# Patient Record
Sex: FEMALE | Race: WHITE | NOT HISPANIC OR LATINO | Employment: UNEMPLOYED | URBAN - METROPOLITAN AREA
[De-identification: names, ages, dates, MRNs, and addresses within clinical notes are randomized per-mention and may not be internally consistent; named-entity substitution may affect disease eponyms.]

---

## 2023-07-05 ENCOUNTER — APPOINTMENT (OUTPATIENT)
Dept: PRIMARY CARE | Facility: CLINIC | Age: 48
End: 2023-07-05

## 2023-08-16 ENCOUNTER — APPOINTMENT (OUTPATIENT)
Dept: PRIMARY CARE | Facility: CLINIC | Age: 48
End: 2023-08-16

## 2023-10-17 DIAGNOSIS — I47.20 V TACH (MULTI): ICD-10-CM

## 2023-10-18 DIAGNOSIS — R06.02 SHORTNESS OF BREATH: ICD-10-CM

## 2023-10-18 DIAGNOSIS — Z01.818 PREPROCEDURAL EXAMINATION: ICD-10-CM

## 2023-10-20 ENCOUNTER — HOSPITAL ENCOUNTER (OUTPATIENT)
Dept: CARDIOLOGY | Facility: HOSPITAL | Age: 48
Discharge: HOME | End: 2023-10-20
Payer: COMMERCIAL

## 2023-10-20 DIAGNOSIS — R00.0 TACHYCARDIA: ICD-10-CM

## 2023-10-27 PROBLEM — R06.02 SOB (SHORTNESS OF BREATH) ON EXERTION: Status: ACTIVE | Noted: 2023-10-27

## 2023-10-31 ENCOUNTER — HOSPITAL ENCOUNTER (OUTPATIENT)
Facility: HOSPITAL | Age: 48
Setting detail: OUTPATIENT SURGERY
Discharge: HOME | End: 2023-10-31
Attending: INTERNAL MEDICINE | Admitting: INTERNAL MEDICINE
Payer: COMMERCIAL

## 2023-10-31 ENCOUNTER — CLINICAL SUPPORT (OUTPATIENT)
Dept: CARDIAC REHAB | Facility: HOSPITAL | Age: 48
End: 2023-10-31
Payer: COMMERCIAL

## 2023-10-31 VITALS
OXYGEN SATURATION: 100 % | RESPIRATION RATE: 12 BRPM | HEART RATE: 73 BPM | SYSTOLIC BLOOD PRESSURE: 135 MMHG | DIASTOLIC BLOOD PRESSURE: 87 MMHG

## 2023-10-31 DIAGNOSIS — I47.20 V TACH (MULTI): ICD-10-CM

## 2023-10-31 DIAGNOSIS — I27.20 PULMONARY HYPERTENSION, UNSPECIFIED (MULTI): ICD-10-CM

## 2023-10-31 DIAGNOSIS — Z01.818 PREPROCEDURAL EXAMINATION: ICD-10-CM

## 2023-10-31 DIAGNOSIS — R06.02 SHORTNESS OF BREATH: ICD-10-CM

## 2023-10-31 DIAGNOSIS — R06.02 SOB (SHORTNESS OF BREATH) ON EXERTION: Primary | ICD-10-CM

## 2023-10-31 DIAGNOSIS — R06.02 SOB (SHORTNESS OF BREATH) ON EXERTION: ICD-10-CM

## 2023-10-31 LAB
ANION GAP SERPL CALC-SCNC: 15 MMOL/L (ref 10–20)
BUN SERPL-MCNC: 10 MG/DL (ref 6–23)
CALCIUM SERPL-MCNC: 9.2 MG/DL (ref 8.6–10.6)
CHLORIDE SERPL-SCNC: 106 MMOL/L (ref 98–107)
CO2 SERPL-SCNC: 24 MMOL/L (ref 21–32)
CREAT SERPL-MCNC: 0.5 MG/DL (ref 0.5–1.05)
ERYTHROCYTE [DISTWIDTH] IN BLOOD BY AUTOMATED COUNT: 12.7 % (ref 11.5–14.5)
GFR SERPL CREATININE-BSD FRML MDRD: >90 ML/MIN/1.73M*2
GLUCOSE SERPL-MCNC: 85 MG/DL (ref 74–99)
HCT VFR BLD AUTO: 35.1 % (ref 36–46)
HGB BLD-MCNC: 11.5 G/DL (ref 12–16)
MCH RBC QN AUTO: 28 PG (ref 26–34)
MCHC RBC AUTO-ENTMCNC: 32.8 G/DL (ref 32–36)
MCV RBC AUTO: 86 FL (ref 80–100)
NRBC BLD-RTO: 0 /100 WBCS (ref 0–0)
PLATELET # BLD AUTO: 225 X10*3/UL (ref 150–450)
PMV BLD AUTO: 10.5 FL (ref 7.5–11.5)
POTASSIUM SERPL-SCNC: 3.4 MMOL/L (ref 3.5–5.3)
RBC # BLD AUTO: 4.1 X10*6/UL (ref 4–5.2)
SODIUM SERPL-SCNC: 142 MMOL/L (ref 136–145)
WBC # BLD AUTO: 4.2 X10*3/UL (ref 4.4–11.3)

## 2023-10-31 PROCEDURE — 93451 RIGHT HEART CATH: CPT | Performed by: INTERNAL MEDICINE

## 2023-10-31 PROCEDURE — 99153 MOD SED SAME PHYS/QHP EA: CPT | Performed by: INTERNAL MEDICINE

## 2023-10-31 PROCEDURE — 85027 COMPLETE CBC AUTOMATED: CPT | Performed by: INTERNAL MEDICINE

## 2023-10-31 PROCEDURE — 99152 MOD SED SAME PHYS/QHP 5/>YRS: CPT | Performed by: INTERNAL MEDICINE

## 2023-10-31 PROCEDURE — C1727 CATH, BAL TIS DIS, NON-VAS: HCPCS | Performed by: INTERNAL MEDICINE

## 2023-10-31 PROCEDURE — C1894 INTRO/SHEATH, NON-LASER: HCPCS | Performed by: INTERNAL MEDICINE

## 2023-10-31 PROCEDURE — 2500000005 HC RX 250 GENERAL PHARMACY W/O HCPCS: Performed by: INTERNAL MEDICINE

## 2023-10-31 PROCEDURE — 2500000004 HC RX 250 GENERAL PHARMACY W/ HCPCS (ALT 636 FOR OP/ED): Performed by: INTERNAL MEDICINE

## 2023-10-31 PROCEDURE — 2720000007 HC OR 272 NO HCPCS: Performed by: INTERNAL MEDICINE

## 2023-10-31 PROCEDURE — 36415 COLL VENOUS BLD VENIPUNCTURE: CPT | Performed by: INTERNAL MEDICINE

## 2023-10-31 PROCEDURE — 80048 BASIC METABOLIC PNL TOTAL CA: CPT | Performed by: INTERNAL MEDICINE

## 2023-10-31 PROCEDURE — 93464 EXERCISE W/HEMODYNAMIC MEAS: CPT | Performed by: INTERNAL MEDICINE

## 2023-10-31 PROCEDURE — 94621 CARDIOPULM EXERCISE TESTING: CPT | Performed by: INTERNAL MEDICINE

## 2023-10-31 RX ORDER — SODIUM CHLORIDE 9 MG/ML
INJECTION, SOLUTION INTRAVENOUS CONTINUOUS PRN
Status: COMPLETED | OUTPATIENT
Start: 2023-10-31 | End: 2023-10-31

## 2023-10-31 RX ORDER — FENTANYL CITRATE 50 UG/ML
INJECTION, SOLUTION INTRAMUSCULAR; INTRAVENOUS AS NEEDED
Status: DISCONTINUED | OUTPATIENT
Start: 2023-10-31 | End: 2023-10-31 | Stop reason: HOSPADM

## 2023-10-31 RX ORDER — LIDOCAINE HYDROCHLORIDE 10 MG/ML
INJECTION, SOLUTION EPIDURAL; INFILTRATION; INTRACAUDAL; PERINEURAL AS NEEDED
Status: DISCONTINUED | OUTPATIENT
Start: 2023-10-31 | End: 2023-10-31 | Stop reason: HOSPADM

## 2023-10-31 RX ORDER — RIOCIGUAT 2.5 MG/1
2.5 TABLET, FILM COATED ORAL 3 TIMES DAILY
COMMUNITY
End: 2024-04-12 | Stop reason: SDUPTHER

## 2023-10-31 RX ORDER — ACETAMINOPHEN 325 MG/1
650 TABLET ORAL EVERY 6 HOURS PRN
Status: CANCELLED | OUTPATIENT
Start: 2023-10-31

## 2023-10-31 RX ORDER — METOPROLOL TARTRATE 25 MG/1
25 TABLET, FILM COATED ORAL 2 TIMES DAILY
COMMUNITY
Start: 2023-10-18 | End: 2024-02-02 | Stop reason: SDUPTHER

## 2023-10-31 RX ORDER — MIDAZOLAM HYDROCHLORIDE 1 MG/ML
INJECTION INTRAMUSCULAR; INTRAVENOUS AS NEEDED
Status: DISCONTINUED | OUTPATIENT
Start: 2023-10-31 | End: 2023-10-31 | Stop reason: HOSPADM

## 2023-10-31 ASSESSMENT — ENCOUNTER SYMPTOMS: PALPITATIONS: 0

## 2023-10-31 NOTE — PROGRESS NOTES
Pharmacy Medication History Review    Jazmin Hein is a 48 y.o. female admitted for Shortness of breath. Pharmacy reviewed the patient's [id number] medications and allergies for accuracy.    The list below reflectives the updated PTA list. Please review each medication in order reconciliation for additional clarification and justification.  Medications Prior to Admission   Medication Sig Dispense Refill Last Dose    metoprolol tartrate (Lopressor) 25 mg tablet Take 1 tablet (25 mg) by mouth 2 times a day.   10/30/2023    Adempas 2.5 mg tablet Take 1 tablet (2.5 mg) by mouth 3 times a day.   10/30/2023    albuterol 90 mcg/actuation inhaler INHALE 1 PUFF EVERY 6 HOURS AS NEEDED 25.5 g 3 10/30/2023    allopurinol (Zyloprim) 100 mg tablet TAKE 1 TABLET BY MOUTH DAILY 90 tablet 3 10/30/2023    amitriptyline (Elavil) 50 mg tablet TAKE 1 TABLET BY MOUTH AT BEDTIME 90 tablet 3 10/30/2023    ergocalciferol (Vitamin D-2) 1.25 MG (52940 UT) capsule TAKE 1 CAPSULE BY MOUTH ONCE WEEKLY 12 capsule 3 Past Week    FLUoxetine (PROzac) 20 mg tablet TAKE 1 TABLET BY MOUTH ONCE DAILY 90 tablet 3 10/30/2023    furosemide (Lasix) 40 mg tablet TAKE 1 TABLET BY MOUTH ONCE DAILY 90 tablet 3 10/30/2023    gabapentin (Neurontin) 300 mg capsule TAKE 1 CAPSULE BY MOUTH ONCE DAILY 90 capsule 3 10/30/2023    gabapentin (Neurontin) 600 mg tablet TAKE 1 TABLET BY MOUTH ONCE DAILY 90 tablet 3 10/30/2023    galcanezumab (Emgality) 120 mg/mL prefilled syringe INJECT 1 ML UNDER THE SKIN ONCE MONTHLY 3 mL 3 10/7/2023    lacosamide (Vimpat) 100 mg tablet TAKE 1 TABLET BY MOUTH TWO TIMES A DAY FOR 30 DAYS 60 each 3 10/30/2023    losartan (Cozaar) 25 mg tablet TAKE 1 TABLET BY MOUTH ONCE DAILY 90 tablet 3 10/30/2023    methocarbamol (Robaxin) 750 mg tablet TAKE 1 TABLET BY MOUTH EVERY 8 HOURS 270 tablet 3 10/30/2023    rimegepant (NURTEC) 75 mg tablet,disintegrating DISSOLVE 1 TABLET IN MOUTH ON THE TONGUE EVERY OTHER DAY 96 tablet 0 Past Week     rivaroxaban (Xarelto) 10 mg tablet TAKE 1 TABLET BY MOUTH ONCE DAILY WITH FOOD 90 tablet 3 10/27/2023    sucralfate (Carafate) 1 gram tablet TAKE 1 TABLET BY MOUTH EVERY 6 HOURS ON AN EMPTY STOMACH 360 tablet 3 10/30/2023    SUMAtriptan (Imitrex) 100 mg tablet TAKE 1 TABLET BY MOUTH TWO TIMES A DAY AS NEEDED 60 tablet 11 Past Month        The list below reflectives the updated allergy list. Please review each documented allergy for additional clarification and justification.  Allergies  Reviewed by Dc Conley RN on 10/31/2023        Severity Reactions Comments    Iodine Low Rash         The patient was a great historian who knew her medications and dosages very well. The legacy holter note 10/20/23 , camryn , care everywhere , sure scripts and oarrs also used as sources.    Below are additional concerns with the patient's PTA list.      Duncan Mackay, [facility]  Transitions of Care Clinical Pharmacist  Please reach out via Epic Chat for questions, if no response call  [phone] or [facility]

## 2023-10-31 NOTE — Clinical Note
Sheath was inserted in the right internal jugular vein. Ultrasound guidance was used. 4 fr micropuncture

## 2023-10-31 NOTE — POST-PROCEDURE NOTE
Physician Transition of Care Summary  Invasive Cardiovascular Lab    Procedure Date: 10/31/2023  Attending:    Milton Holt - Primary  Resident/Fellow/Other Assistant: Surgeon(s) and Role:     * Fadumo Sena MD - Fellow     * Morro Dias MD - Fellow    Indications:   Pre-op Diagnosis     * Shortness of breath [R06.02]    Post-procedure diagnosis:   Post-op Diagnosis     * Shortness of breath [R06.02]    Procedure(s):   Right Heart Cath With Exercise  54837 - [place] RIGHT HEART CATH O2 SATURATION & CARDIAC OUTPUT    Right Heart Cath With Exercise  13804 - [place] PHYSIOLOGIC EXERCISE STUDY & HEMODYNAMIC MEASU    Procedure Findings:   Pulmonary hypertension.     RA: 6  RV: 69/4 (EDP 9)  PA: 62/20 (mean 38)  PCWP: 38  CO/CI: 5.8/3.1    PA sat: 70%    Description of the Procedure:   RHC: 7Fr RIJ access with leave-in swan-petr catheter to 50 cm    Arterial line: right radial artery access    Taken for exercise testing on treadmill after swan-petr catheter and arterial line placed.     Complications:   None    Stents/Implants:   None.     Anticoagulation/Antiplatelet Plan:   Resume home xarelto post procedure, held since 10/27.     Estimated Blood Loss:   5 mL    Anesthesia: Moderate Sedation Anesthesia Staff: No anesthesia staff entered.    Any Specimen(s) Removed:   No specimens collected during this procedure.    Disposition:   Discharge home post procedure.     Electronically signed by: Morro Dias MD, 10/31/2023 10:07 AM

## 2023-10-31 NOTE — Clinical Note
Sheath was left in place in the right internal jugular vein. Site secured by suture and transparent dressing. A pressure bag was used. Hemostasis obtained.

## 2023-10-31 NOTE — Clinical Note
Sheath was left in place in the right radial artery. Site secured by suture. A pressure bag was used. Hemostasis obtained.

## 2023-10-31 NOTE — H&P
"History Of Present Illness  Jazmin Hein is a 48 y.o. female with PMH of blood clots (, 2015), HTN, migraines, TBI (2019), ovarian cancer, and pulmonary hypertension (suspected CTEPH) who is presenting today for exercise RHC referred by Dr. Johnson.    She reports she was initially diagnosed with pulmonary hypertension in 2023 in [place] by Dr. Brar with [facility] who believed the mechanism to be CTEPH and thought to be inoperable. She was started on Adempas in July. She moved back to the area from [place] recently and says since then has uptitrated Adempas much faster (now at 2.5 TID since 23) and is seeing some improvement. On consultation with Dr. Johnson she reports that he does not believe she has CTEPH and thinks that her symptoms are exercise-induced and would like further evaluation.     Her primary symptoms are fatigue and shortness of breath that has been worsening over the last year. She has LE edema for which she takes daily lasix and intermittent chest pains as well.     RHC (23): PAp 60/ (33), PW 5, C.O. 4.52  V/Q (3/27/23): no discrete ventilation abnormalities, no bronchopulmonary segment conforming perfusion defects; Dr. Brar notes \"segmental perfusion defect in right mid lung zones that appear mismatched\"  PFTs (3/20/23): available for review  Echo (3/7/23): moderately enlarged RV with mildly reduced function, RA is upper limits of normal in size, moderate MR, no AR, moderate TR, RVSP 71 mmHg -> TODAY APPEARS BETTER, SHUNT (-)     Past Medical History  Past Medical History:   Diagnosis Date    Personal history of other diseases of the female genital tract 2016    History of endometriosis    Personal history of pulmonary embolism 2016    History of pulmonary embolism       Surgical History  Past Surgical History:   Procedure Laterality Date     SECTION, CLASSIC  2016     Section    GASTRIC BYPASS  2017    Gastric Surgery For Morbid Obesity " Bypass With [place]    HYSTERECTOMY  07/05/2016    Hysterectomy    OTHER SURGICAL HISTORY  07/05/2016    Gynecologic Laparoscopy With Adhesiolysis Fallopian Tubes        Social History  She has no history on file for tobacco use, alcohol use, and drug use.    Family History  No family history on file.     Allergies  Iodine    Review of Systems   Cardiovascular:  Negative for chest pain, palpitations and leg swelling.        Physical Exam     Last Recorded Vitals  There were no vitals taken for this visit.    Relevant Results      Constitutional: NAD, pleasant  HEENT: PERRLA, EOMI, no scleral icterus, MMM  CV: RRR, no m/r/g, no JVD  Pulm: CTAB, no increased WOB  GI: Soft, NT, ND, normoactive BS  Extremities: mild LE edema  Skin: WWP  Neuro: A&Ox4, no FND  Psych: Mood and affect appropriate to situation     Assessment/Plan   Principal Problem:    Shortness of breath  Active Problems:    SOB (shortness of breath) on exertion      -Plan for RHC with exercise  -Follow-up with Dr. Johnson for further management recommendations     I spent 30 minutes in the professional and overall care of this patient.      Morro Dias MD

## 2023-10-31 NOTE — Clinical Note
Gallion left in right jugular and art line placed in right radial.  Pt transported to new holding room for exercise challenge with Dr. Miguel

## 2023-11-01 ENCOUNTER — TELEPHONE (OUTPATIENT)
Dept: PULMONOLOGY | Facility: HOSPITAL | Age: 48
End: 2023-11-01
Payer: COMMERCIAL

## 2023-11-01 ENCOUNTER — APPOINTMENT (OUTPATIENT)
Dept: PULMONOLOGY | Facility: HOSPITAL | Age: 48
End: 2023-11-01
Payer: COMMERCIAL

## 2023-11-01 DIAGNOSIS — E87.8 ELECTROLYTE AND FLUID DISORDER: ICD-10-CM

## 2023-11-01 RX ORDER — POTASSIUM CHLORIDE 750 MG/1
10 TABLET, FILM COATED, EXTENDED RELEASE ORAL DAILY
Qty: 30 TABLET | Refills: 11 | Status: SHIPPED | OUTPATIENT
Start: 2023-11-01 | End: 2024-03-22 | Stop reason: HOSPADM

## 2023-11-01 NOTE — TELEPHONE ENCOUNTER
----- Message from Silvio Johnson DO sent at 10/31/2023 10:01 AM EDT -----  Needs additional potassium supplementation for K = 3.4  ----- Message -----  From: Lab, Background User  Sent: 10/31/2023   8:50 AM EDT  To: Silvio Johnson DO

## 2023-11-01 NOTE — TELEPHONE ENCOUNTER
Result Communication    Resulted Orders   CBC   Result Value Ref Range    WBC 4.2 (L) 4.4 - 11.3 x10*3/uL    nRBC 0.0 0.0 - 0.0 /100 WBCs    RBC 4.10 4.00 - 5.20 x10*6/uL    Hemoglobin 11.5 (L) 12.0 - 16.0 g/dL    Hematocrit 35.1 (L) 36.0 - 46.0 %    MCV 86 80 - 100 fL    MCH 28.0 26.0 - 34.0 pg    MCHC 32.8 32.0 - 36.0 g/dL    RDW 12.7 11.5 - 14.5 %    Platelets 225 150 - 450 x10*3/uL    MPV 10.5 7.5 - 11.5 fL   Basic Metabolic Panel   Result Value Ref Range    Glucose 85 74 - 99 mg/dL    Sodium 142 136 - 145 mmol/L    Potassium 3.4 (L) 3.5 - 5.3 mmol/L    Chloride 106 98 - 107 mmol/L    Bicarbonate 24 21 - 32 mmol/L    Anion Gap 15 10 - 20 mmol/L    Urea Nitrogen 10 6 - 23 mg/dL    Creatinine 0.50 0.50 - 1.05 mg/dL    eGFR >90 >60 mL/min/1.73m*2      Comment:      Calculations of estimated GFR are performed using the 2021 CKD-EPI Study Refit equation without the race variable for the IDMS-Traceable creatinine methods.  https://jasn.asnjournals.org/content/early/2021/09/22/ASN.4268350919    Calcium 9.2 8.6 - 10.6 mg/dL       10:49 AM      Results {WERE / WERE NOT:88720} successfully communicated with the {RHEUM PARENT/PATIENT:99235} and they {AMB Acknowledged/Did Not Acknowledge:89908} their understanding.

## 2023-11-01 NOTE — TELEPHONE ENCOUNTER
Patient K 3.4 on preprocedure labs, not currently taking potassium supplement. On lasix 40mg daily - started few months ago per patient. Dr. Johnson gave order to start potassium 10 meq daily. Patient verbalized understanding and is in agreement.

## 2023-11-13 RX ORDER — MELOXICAM 15 MG/1
1 TABLET ORAL DAILY
COMMUNITY
End: 2024-02-02 | Stop reason: WASHOUT

## 2023-11-13 RX ORDER — TIZANIDINE 4 MG/1
TABLET ORAL
COMMUNITY
End: 2024-03-08 | Stop reason: WASHOUT

## 2023-11-13 RX ORDER — CYCLOBENZAPRINE HCL 10 MG
TABLET ORAL
COMMUNITY
End: 2024-03-08 | Stop reason: WASHOUT

## 2023-11-13 RX ORDER — OMEPRAZOLE 40 MG/1
CAPSULE, DELAYED RELEASE ORAL
COMMUNITY

## 2023-11-13 NOTE — PROGRESS NOTES
History Of Present Illness  Jazmin Hein is a 48 y.o. female being seen in clinic iCEPT results. She was last seen in clinic on 2023. Original consult was on 2023.      RHC/ iCEPT (10/31/2023) At rest PAp: 70/35 (47); PWP: 18; CO/CI 8.1/4.3. with exercise PAp: 90/40 (57); PWP: 20; CO/CI: 11.2/6.1      PAH Treatment: Adempas 2.5mg TID.  Infusion site: N/A  Treatment history: N/A    23 No testing for today's visit.      Past Medical History  Patient Active Problem List   Diagnosis    Shortness of breath    SOB (shortness of breath) on exertion        Surgical History  She has a past surgical history that includes Hysterectomy (2016); Other surgical history (2016);  section, classic (2016); Gastric bypass (2017); and Cardiac catheterization (N/A, 10/31/2023).     Social History  She has no history on file for tobacco use, alcohol use, and drug use.    Family History  No family history on file.     Medications      Current Outpatient Medications:     albuterol (Ventolin HFA) 90 mcg/actuation inhaler, Inhale., Disp: , Rfl:     allopurinol (Zyloprim) 100 mg tablet, Take 1 tablet (100 mg) by mouth 2 times a day., Disp: , Rfl:     amitriptyline (Elavil) 50 mg tablet, Take 1 tablet (50 mg) by mouth once daily at bedtime., Disp: , Rfl:     blood sugar diagnostic strip, as directed, Disp: , Rfl:     ergocalciferol (Vitamin D-2) 1.25 MG (24859 UT) capsule, Take 1 capsule (1,250 mcg) by mouth once a week., Disp: , Rfl:     lamoTRIgine (LaMICtal) 100 mg tablet, , Disp: , Rfl:     Adempas 2.5 mg tablet, Take 1 tablet (2.5 mg) by mouth 3 times a day., Disp: , Rfl:     albuterol 90 mcg/actuation inhaler, INHALE 1 PUFF EVERY 6 HOURS AS NEEDED, Disp: 25.5 g, Rfl: 3    allopurinol (Zyloprim) 100 mg tablet, TAKE 1 TABLET BY MOUTH DAILY, Disp: 90 tablet, Rfl: 3    ALLOPURINOL ORAL, Take by mouth once daily., Disp: , Rfl:     amitriptyline (Elavil) 50 mg tablet, TAKE 1 TABLET BY MOUTH AT  BEDTIME, Disp: 90 tablet, Rfl: 3    ascorbic acid (Vitamin C) 500 mg tablet, Take 1 tablet (500 mg) by mouth once daily., Disp: , Rfl:     butalbitaL-acetaminophen  mg capsule, Take 1 capsule every 4 hours by oral route as needed., Disp: , Rfl:     CALCIUM CARBONATE-VITAMIN D3 ORAL, Take by mouth., Disp: , Rfl:     cariprazine (Vraylar) 1.5 mg capsule, , Disp: , Rfl:     cyclobenzaprine (Flexeril) 10 mg tablet, Take 1 tablet 3 times a day by oral route., Disp: , Rfl:     ergocalciferol (Vitamin D-2) 1.25 MG (17362 UT) capsule, TAKE 1 CAPSULE BY MOUTH ONCE WEEKLY, Disp: 12 capsule, Rfl: 3    FLUoxetine (PROzac) 20 mg tablet, TAKE 1 TABLET BY MOUTH ONCE DAILY, Disp: 90 tablet, Rfl: 3    furosemide (Lasix) 40 mg tablet, TAKE 1 TABLET BY MOUTH ONCE DAILY, Disp: 90 tablet, Rfl: 3    furosemide (Lasix) 40 mg tablet, Take by mouth once daily., Disp: , Rfl:     gabapentin (Neurontin) 300 mg capsule, TAKE 1 CAPSULE BY MOUTH ONCE DAILY, Disp: 90 capsule, Rfl: 3    gabapentin (Neurontin) 600 mg tablet, TAKE 1 TABLET BY MOUTH ONCE DAILY, Disp: 90 tablet, Rfl: 3    galcanezumab (Emgality) 120 mg/mL prefilled syringe, INJECT 1 ML UNDER THE SKIN ONCE MONTHLY, Disp: 3 mL, Rfl: 3    lacosamide (Vimpat) 100 mg tablet, TAKE 1 TABLET BY MOUTH TWO TIMES A DAY FOR 30 DAYS, Disp: 60 each, Rfl: 3    loratadine (Claritin) 10 mg tablet, Take 1 tablet (10 mg) by mouth once daily as needed., Disp: , Rfl:     losartan (Cozaar) 25 mg tablet, TAKE 1 TABLET BY MOUTH ONCE DAILY, Disp: 90 tablet, Rfl: 3    meloxicam (Mobic) 15 mg tablet, Take 1 tablet (15 mg) by mouth once daily., Disp: , Rfl:     methocarbamol (Robaxin) 750 mg tablet, TAKE 1 TABLET BY MOUTH EVERY 8 HOURS, Disp: 270 tablet, Rfl: 3    metoprolol tartrate (Lopressor) 25 mg tablet, Take 1 tablet (25 mg) by mouth 2 times a day., Disp: , Rfl:     multivitamin capsule, Take 1 capsule by mouth once daily., Disp: , Rfl:     omeprazole (PriLOSEC) 40 mg DR capsule, Take 1 capsule every  "day by oral route., Disp: , Rfl:     potassium chloride CR (Klor-Con) 10 mEq ER tablet, Take 1 tablet (10 mEq) by mouth once daily. Do not crush, chew, or split., Disp: 30 tablet, Rfl: 11    rimegepant (NURTEC) 75 mg tablet,disintegrating, DISSOLVE 1 TABLET IN MOUTH ON THE TONGUE EVERY OTHER DAY, Disp: 96 tablet, Rfl: 0    rivaroxaban (Xarelto) 10 mg tablet, TAKE 1 TABLET BY MOUTH ONCE DAILY WITH FOOD, Disp: 90 tablet, Rfl: 3    sucralfate (Carafate) 1 gram tablet, TAKE 1 TABLET BY MOUTH EVERY 6 HOURS ON AN EMPTY STOMACH, Disp: 360 tablet, Rfl: 3    SUMAtriptan (Imitrex) 100 mg tablet, TAKE 1 TABLET BY MOUTH TWO TIMES A DAY AS NEEDED, Disp: 60 tablet, Rfl: 11    tiZANidine (Zanaflex) 4 mg tablet, , Disp: , Rfl:      Allergies  Iodine (bulk) and Iodine    Review of Systems   Constitutional:  Positive for fatigue.   HENT: Negative.     Respiratory:  Positive for shortness of breath.    Cardiovascular:  Positive for palpitations.   Gastrointestinal: Negative.    Endocrine: Negative.    Genitourinary: Negative.    Skin: Negative.    Neurological: Negative.    Hematological: Negative.        Last Recorded Vitals  Blood pressure 97/63, pulse 98, height 1.626 m (5' 4\"), weight 108 kg (239 lb), SpO2 97 %.     Physical Exam  Constitutional:       Appearance: She is obese.   Cardiovascular:      Rate and Rhythm: Normal rate and regular rhythm.      Heart sounds: Murmur (2/6 ANA LSB) heard.   Pulmonary:      Breath sounds: Normal breath sounds.   Abdominal:      General: Bowel sounds are normal.      Palpations: Abdomen is soft.   Musculoskeletal:      Right lower leg: No edema.      Left lower leg: No edema.   Skin:     Findings: No rash.   Neurological:      General: No focal deficit present.   Psychiatric:         Mood and Affect: Mood normal.         Judgment: Judgment normal.       Vitals:    11/16/23 1438   BP: 97/63   Pulse: 98   SpO2: 97%     Vp=815       Relevant Results    6MWT (9/14/23)  SpO2: 97% - 95% on RA  HR: " "100 - 126  Wilfredo: 1 - 5  Actual 189m       V/Q scan (2023) The perfusion of both lungs is within normal limits with no evidence  for acute pulmonary embolism. Very low probability.           6MWT (2023)  SpO2: 100-97% 4LPM  HR:   Wilfredo-100  Actual meters: 146         RHC (23): PAp 60/23 (33), PW 5, C.O. 4.52    V/Q (3/27/23): no discrete ventilation abnormalities, no bronchopulmonary segment conforming perfusion defects; Dr. Brar notes \"segmental perfusion defect in right mid lung zones that appear mismatched\"    PFTs (3/20/23): available for review    Echo (3/7/23): moderately enlarged RV with mildly reduced function, RA is upper limits of normal in size, moderate MR, no AR, moderate TR, RVSP 71 mmHg               Assessment/Plan     1) Pulmonary  a. Normal PFTs and DLCO  b. Essentially normal CT  c. Paradoxically reported low oxygen levels although SpO2 walking for me today = 97% on 4l/min, walked on room air 2023 with desat to 95% on RA.   d. Stated remote () unprovoked PE with recurrence immediately after ceasing anticoagulation. Interpretation of V/Q with ? RML defect (Peripherally) I do not see it. Original interpretation did not see it. It is not common for presentation this many years after PE. Similarly even if there was small peripheral R defect, it does not explain profound report desat and exercise intolerance displayed. SEE BELOW UNDER CARDIOVASCULAR, NEW CATH CONSISTENT WITH PAH.  e. Mild ELENA, AHI =6 from study, lowest SpO2= 87% relatively briefly.      2) Cardiovascular  a. Paroxysmal resting SVT. (+) ZIO patch referred to EP.   b. Original Resting PA mean of 32 reported which was confusing given,  progression to 47 mm HG consistent with IPAH, FC III. Given adempas, needs additional therapy. Exercise cath 47-> 57, no change in PCWP.  c. ? TIA remote.     3) Neuropsych  a. ON multiple med  b. migranes.     May have fatigue elements of long COVID     Plan  1) Event " monitor (+) for tachycardia, referred to EP, patient not called, EPIC instituted, re-referred.   2) Add macitentan and selexipag sequentially. Macitentan first, then selexipag in about 4 weeks, follow up 6-8 weeks with 6 MW and echo.

## 2023-11-16 ENCOUNTER — OFFICE VISIT (OUTPATIENT)
Dept: PULMONOLOGY | Facility: HOSPITAL | Age: 48
End: 2023-11-16
Payer: COMMERCIAL

## 2023-11-16 VITALS
HEIGHT: 64 IN | OXYGEN SATURATION: 97 % | HEART RATE: 98 BPM | DIASTOLIC BLOOD PRESSURE: 63 MMHG | SYSTOLIC BLOOD PRESSURE: 97 MMHG | WEIGHT: 239 LBS | BODY MASS INDEX: 40.8 KG/M2

## 2023-11-16 DIAGNOSIS — I27.21 PULMONARY ARTERIAL HYPERTENSION (MULTI): Primary | ICD-10-CM

## 2023-11-16 DIAGNOSIS — R06.02 SHORTNESS OF BREATH: ICD-10-CM

## 2023-11-16 DIAGNOSIS — I27.20 PULMONARY HYPERTENSION (MULTI): ICD-10-CM

## 2023-11-16 DIAGNOSIS — R06.02 SOB (SHORTNESS OF BREATH) ON EXERTION: ICD-10-CM

## 2023-11-16 PROCEDURE — 99215 OFFICE O/P EST HI 40 MIN: CPT | Performed by: INTERNAL MEDICINE

## 2023-11-16 RX ORDER — ERGOCALCIFEROL 1.25 MG/1
1 CAPSULE ORAL WEEKLY
COMMUNITY
Start: 2023-08-14 | End: 2024-02-02 | Stop reason: WASHOUT

## 2023-11-16 RX ORDER — ALLOPURINOL 100 MG/1
1 TABLET ORAL 2 TIMES DAILY
COMMUNITY
Start: 2023-08-14 | End: 2024-02-02 | Stop reason: WASHOUT

## 2023-11-16 RX ORDER — AMITRIPTYLINE HYDROCHLORIDE 50 MG/1
1 TABLET, FILM COATED ORAL NIGHTLY
COMMUNITY
Start: 2023-08-14 | End: 2024-02-02 | Stop reason: WASHOUT

## 2023-11-16 RX ORDER — LORATADINE 10 MG/1
1 TABLET ORAL DAILY
COMMUNITY

## 2023-11-16 RX ORDER — ALBUTEROL SULFATE 90 UG/1
AEROSOL, METERED RESPIRATORY (INHALATION)
COMMUNITY
Start: 2023-08-14 | End: 2024-03-08 | Stop reason: SDUPTHER

## 2023-11-16 RX ORDER — ASCORBIC ACID 500 MG
500 TABLET ORAL DAILY
COMMUNITY

## 2023-11-16 RX ORDER — LAMOTRIGINE 100 MG/1
1 TABLET ORAL NIGHTLY
COMMUNITY
Start: 2023-10-24

## 2023-11-16 RX ORDER — FUROSEMIDE 40 MG/1
TABLET ORAL
COMMUNITY
End: 2024-02-02 | Stop reason: SDUPTHER

## 2023-11-16 RX ORDER — BUTALBITAL AND ACETAMINOPHEN 50; 300 MG/1; MG/1
1 CAPSULE ORAL EVERY 4 HOURS PRN
COMMUNITY

## 2023-11-16 ASSESSMENT — ENCOUNTER SYMPTOMS
PALPITATIONS: 1
NEUROLOGICAL NEGATIVE: 1
HEMATOLOGIC/LYMPHATIC NEGATIVE: 1
GASTROINTESTINAL NEGATIVE: 1
FATIGUE: 1
ENDOCRINE NEGATIVE: 1
SHORTNESS OF BREATH: 1

## 2023-11-16 ASSESSMENT — PAIN SCALES - GENERAL: PAINLEVEL: 0-NO PAIN

## 2024-01-04 PROCEDURE — RXMED WILLOW AMBULATORY MEDICATION CHARGE

## 2024-01-08 ENCOUNTER — PHARMACY VISIT (OUTPATIENT)
Dept: PHARMACY | Facility: CLINIC | Age: 49
End: 2024-01-08
Payer: COMMERCIAL

## 2024-01-29 ENCOUNTER — PATIENT OUTREACH (OUTPATIENT)
Dept: CARE COORDINATION | Facility: CLINIC | Age: 49
End: 2024-01-29
Payer: COMMERCIAL

## 2024-01-29 NOTE — PROGRESS NOTES
Received call from spouse, Gerardo.  States they have recently relocated to the [facility] from [place] due to Jazmin's health concerns and difficulty utilizing [facility] medical resources while in [place].  He states she is active w PCP through [facility]  and he states Jazmin is already established w Dr. Johnson for pulmonary HTN and that she also has hx of PE, right sided HF, and now recently (since 1.17 or 1.18.24) her Apple watch is detecting intermittent afib.  Needs help w obtaining Cardiology appt.  Appt scheduled w Dr. Martin on 2.2 at 11:20 am.  Gerardo states that Jazmin is already on Xarelto for PE.  Advised follow up w UC or ED for confirmation of afib.  He is unsure if she will be agreeable to this but will communicate this information to her.  Encouraged to call for any other issues/problems/concerns.

## 2024-02-01 PROBLEM — R10.811 ABDOMINAL RIGHT UPPER QUADRANT TENDERNESS: Status: ACTIVE | Noted: 2024-02-01

## 2024-02-01 PROBLEM — Z99.81 DEPENDENCE ON SUPPLEMENTAL OXYGEN: Status: ACTIVE | Noted: 2023-08-14

## 2024-02-01 PROBLEM — G45.9 TIA (TRANSIENT ISCHEMIC ATTACK): Status: ACTIVE | Noted: 2023-05-11

## 2024-02-01 PROBLEM — E55.9 VITAMIN D DEFICIENCY: Status: ACTIVE | Noted: 2024-02-01

## 2024-02-01 PROBLEM — F31.9 BIPOLAR DISORDER (MULTI): Status: ACTIVE | Noted: 2018-12-21

## 2024-02-01 PROBLEM — R93.1 ABNORMAL FINDINGS ON CARDIAC CATHETERIZATION: Status: ACTIVE | Noted: 2024-02-01

## 2024-02-01 PROBLEM — T78.40XA ALLERGIES: Status: ACTIVE | Noted: 2024-02-01

## 2024-02-01 PROBLEM — K71.9 DRUG-INDUCED LIVER INJURY: Status: ACTIVE | Noted: 2021-12-12

## 2024-02-01 PROBLEM — K25.9 GASTRIC ULCER: Status: ACTIVE | Noted: 2021-12-09

## 2024-02-01 PROBLEM — I27.21 PULMONARY ARTERIAL HYPERTENSION (MULTI): Status: RESOLVED | Noted: 2023-11-16 | Resolved: 2024-02-01

## 2024-02-01 PROBLEM — U07.1 DISEASE DUE TO SEVERE ACUTE RESPIRATORY SYNDROME CORONAVIRUS 2 (SARS-COV-2): Status: ACTIVE | Noted: 2024-02-01

## 2024-02-01 PROBLEM — M1A.00X0 CHRONIC GOUTY ARTHRITIS: Status: ACTIVE | Noted: 2024-02-01

## 2024-02-01 PROBLEM — G47.33 OBSTRUCTIVE SLEEP APNEA OF ADULT: Status: ACTIVE | Noted: 2024-02-01

## 2024-02-01 PROBLEM — E16.2 HYPOGLYCEMIA: Status: ACTIVE | Noted: 2020-05-19

## 2024-02-01 PROBLEM — M1A.3790: Status: ACTIVE | Noted: 2022-09-28

## 2024-02-01 PROBLEM — R29.898 LEFT ARM WEAKNESS: Status: ACTIVE | Noted: 2024-02-01

## 2024-02-01 PROBLEM — R10.9 ABDOMINAL PAIN OF MULTIPLE SITES: Status: ACTIVE | Noted: 2024-02-01

## 2024-02-01 PROBLEM — Z98.84 BARIATRIC SURGERY STATUS: Status: ACTIVE | Noted: 2020-08-02

## 2024-02-01 PROBLEM — R20.2 ARM PARESTHESIA, LEFT: Status: ACTIVE | Noted: 2024-02-01

## 2024-02-01 PROBLEM — R93.1 ABNORMAL ECHOCARDIOGRAM: Status: ACTIVE | Noted: 2024-02-01

## 2024-02-01 PROBLEM — G43.909 MIGRAINE HEADACHE: Status: ACTIVE | Noted: 2024-02-01

## 2024-02-01 PROBLEM — R53.1 WEAKNESS: Status: ACTIVE | Noted: 2024-02-01

## 2024-02-01 PROBLEM — E78.89 LIPIDS ABNORMAL: Status: ACTIVE | Noted: 2024-02-01

## 2024-02-01 PROBLEM — R11.2 NAUSEA AND VOMITING: Status: ACTIVE | Noted: 2024-02-01

## 2024-02-01 PROBLEM — N20.1 URETEROLITHIASIS: Status: ACTIVE | Noted: 2022-02-02

## 2024-02-01 PROBLEM — E78.5 HLD (HYPERLIPIDEMIA): Status: ACTIVE | Noted: 2024-02-01

## 2024-02-01 PROBLEM — R42 DIZZINESS: Status: ACTIVE | Noted: 2020-08-03

## 2024-02-01 PROBLEM — G89.18 POST-OPERATIVE PAIN: Status: ACTIVE | Noted: 2024-02-01

## 2024-02-01 PROBLEM — R10.13 ABDOMINAL PAIN, EPIGASTRIC: Status: ACTIVE | Noted: 2024-02-01

## 2024-02-01 PROBLEM — E87.8 ELECTROLYTE AND FLUID DISORDER: Status: ACTIVE | Noted: 2024-02-01

## 2024-02-01 PROBLEM — K91.2 HYPOGLYCEMIA AFTER GI (GASTROINTESTINAL) SURGERY (HHS-HCC): Status: ACTIVE | Noted: 2020-08-02

## 2024-02-01 PROBLEM — M79.89 BILATERAL SWELLING OF FEET: Status: ACTIVE | Noted: 2022-09-28

## 2024-02-01 PROBLEM — M54.50 LOWER BACK PAIN: Status: ACTIVE | Noted: 2024-02-01

## 2024-02-01 PROBLEM — I27.20 PULMONARY HYPERTENSION (MULTI): Status: ACTIVE | Noted: 2023-10-31

## 2024-02-01 PROBLEM — R74.01 TRANSAMINITIS: Status: ACTIVE | Noted: 2021-12-09

## 2024-02-01 PROBLEM — R32 INTERMITTENT URINARY INCONTINENCE: Status: ACTIVE | Noted: 2024-02-01

## 2024-02-01 PROBLEM — D72.819 LEUKOPENIA: Status: ACTIVE | Noted: 2020-08-02

## 2024-02-01 PROBLEM — Z85.43 HISTORY OF OVARIAN CANCER: Status: ACTIVE | Noted: 2020-08-02

## 2024-02-01 PROBLEM — M79.7 FIBROMYALGIA: Status: ACTIVE | Noted: 2020-05-19

## 2024-02-01 PROBLEM — G47.00 INSOMNIA: Status: ACTIVE | Noted: 2024-02-01

## 2024-02-01 PROBLEM — K81.1 CHOLECYSTITIS, CHRONIC: Status: ACTIVE | Noted: 2024-02-01

## 2024-02-01 PROBLEM — R00.0 TACHYCARDIA: Status: ACTIVE | Noted: 2023-09-14

## 2024-02-01 PROBLEM — N13.9 ACUTE UNILATERAL OBSTRUCTIVE UROPATHY: Status: ACTIVE | Noted: 2022-02-04

## 2024-02-01 PROBLEM — R23.2 FLUSHING: Status: ACTIVE | Noted: 2024-02-01

## 2024-02-01 PROBLEM — R20.2 PARESTHESIA OF SKIN: Status: ACTIVE | Noted: 2023-05-11

## 2024-02-02 ENCOUNTER — OFFICE VISIT (OUTPATIENT)
Dept: CARDIOLOGY | Facility: CLINIC | Age: 49
End: 2024-02-02
Payer: COMMERCIAL

## 2024-02-02 VITALS
BODY MASS INDEX: 39.27 KG/M2 | SYSTOLIC BLOOD PRESSURE: 108 MMHG | WEIGHT: 230 LBS | HEART RATE: 107 BPM | HEIGHT: 64 IN | DIASTOLIC BLOOD PRESSURE: 68 MMHG

## 2024-02-02 DIAGNOSIS — I27.20 PULMONARY HYPERTENSION (MULTI): ICD-10-CM

## 2024-02-02 DIAGNOSIS — J96.11 CHRONIC RESPIRATORY FAILURE WITH HYPOXIA (MULTI): ICD-10-CM

## 2024-02-02 DIAGNOSIS — I49.3 PVC (PREMATURE VENTRICULAR CONTRACTION): ICD-10-CM

## 2024-02-02 DIAGNOSIS — I47.29 NSVT (NONSUSTAINED VENTRICULAR TACHYCARDIA) (MULTI): ICD-10-CM

## 2024-02-02 DIAGNOSIS — I27.82 CHRONIC PULMONARY EMBOLISM, UNSPECIFIED PULMONARY EMBOLISM TYPE, UNSPECIFIED WHETHER ACUTE COR PULMONALE PRESENT (MULTI): ICD-10-CM

## 2024-02-02 DIAGNOSIS — I47.10 PAROXYSMAL SUPRAVENTRICULAR TACHYCARDIA (CMS-HCC): ICD-10-CM

## 2024-02-02 DIAGNOSIS — R07.9 CHEST PAIN, UNSPECIFIED TYPE: ICD-10-CM

## 2024-02-02 DIAGNOSIS — I49.1 PAC (PREMATURE ATRIAL CONTRACTION): ICD-10-CM

## 2024-02-02 DIAGNOSIS — R06.02 SOB (SHORTNESS OF BREATH) ON EXERTION: Primary | ICD-10-CM

## 2024-02-02 DIAGNOSIS — I10 PRIMARY HYPERTENSION: ICD-10-CM

## 2024-02-02 DIAGNOSIS — R00.2 PALPITATIONS: ICD-10-CM

## 2024-02-02 DIAGNOSIS — Z95.0 PACEMAKER: ICD-10-CM

## 2024-02-02 PROCEDURE — 1036F TOBACCO NON-USER: CPT | Performed by: INTERNAL MEDICINE

## 2024-02-02 PROCEDURE — 3078F DIAST BP <80 MM HG: CPT | Performed by: INTERNAL MEDICINE

## 2024-02-02 PROCEDURE — 93000 ELECTROCARDIOGRAM COMPLETE: CPT | Performed by: INTERNAL MEDICINE

## 2024-02-02 PROCEDURE — 99205 OFFICE O/P NEW HI 60 MIN: CPT | Performed by: INTERNAL MEDICINE

## 2024-02-02 PROCEDURE — 3074F SYST BP LT 130 MM HG: CPT | Performed by: INTERNAL MEDICINE

## 2024-02-02 RX ORDER — MACITENTAN 10 MG/1
10 TABLET, FILM COATED ORAL DAILY
COMMUNITY

## 2024-02-02 RX ORDER — METOPROLOL TARTRATE 25 MG/1
50 TABLET, FILM COATED ORAL 2 TIMES DAILY
Qty: 360 TABLET | Refills: 3 | Status: SHIPPED | OUTPATIENT
Start: 2024-02-02 | End: 2025-02-01

## 2024-02-02 NOTE — PROGRESS NOTES
"Chief Complaint:   New Patient Visit     History Of Present Illness:    Jazmin Hein is a 48 y.o. female here for evaluation of palpitations.  She has history of primary pulmonary hypertension and is following up with Dr. Hagan in [facility].  She has some right heart failure or cor pulmonale from this and remains on diuretics.  She has history of recurrent PE and remains on a prophylactic dose of Xarelto she has moved from [place] recently.  She had a pacemaker implanted in 2019 while in [place] for what sounds like a malignant vasovagal syncope although the records are yet to be reviewed.  After pacemaker implantation she was told she has SVTs noted on pacemaker check and was started on a beta-blocker.  Also has a prior history of TBI, seizure disorder from that, gastric bypass surgery in 2017 some stomach ulcers, right knee replacement in 2018 followed by revision in 2019 and ovarian cancer at the age of 21.    No family history of coronary artery disease in immediate relatives.    She has history of hypertension and borderline increased lipids.    She recently started noticing a lot of palpitations and skipped beats.  This has been going on for months has worsened in the last few weeks.  As a result Dr Johnson ordered a event recorder that showed PVCs correlating with her symptoms.  Additionally there were nonsustained VT and PACs noted as well I personally reviewed the monitor.    She had an echo done at [facility] September 2023 that was normal other than moderate to severe pulmonary hypertension.  She had a right heart catheterization with exercise done at [facility] that showed normal cardiac output index and normal cardiac indicis with only pulmonary arterial hypertension.     Last Recorded Vitals:  Vitals:    02/02/24 1133   BP: 108/68   Pulse: 107   Weight: 104 kg (230 lb)   Height: 1.626 m (5' 4\")       Past Medical History:  She has a past medical history of Personal history of other diseases of " the female genital tract (2016) and Personal history of pulmonary embolism (2016).    Past Surgical History:  She has a past surgical history that includes Hysterectomy (2016); Other surgical history (2016);  section, classic (2016); Gastric bypass (2017); and Cardiac catheterization (N/A, 10/31/2023).      Social History:  She reports that she has never smoked. She has never used smokeless tobacco. She reports that she does not currently use alcohol. She reports that she does not use drugs.    Family History:  No family history on file.     Allergies:  Iodine (bulk) and Iodine    Outpatient Medications:  Current Outpatient Medications   Medication Instructions    Adempas 2.5 mg, oral, 3 times daily    albuterol (Ventolin HFA) 90 mcg/actuation inhaler inhalation    albuterol 90 mcg/actuation inhaler INHALE 1 PUFF EVERY 6 HOURS AS NEEDED    allopurinol (Zyloprim) 100 mg tablet TAKE 1 TABLET BY MOUTH DAILY    allopurinol (Zyloprim) 100 mg tablet 1 tablet, oral, 2 times daily    ALLOPURINOL ORAL oral, Daily RT    amitriptyline (Elavil) 50 mg tablet TAKE 1 TABLET BY MOUTH AT BEDTIME    amitriptyline (Elavil) 50 mg tablet 1 tablet, oral, Nightly    ascorbic acid (VITAMIN C) 500 mg, oral, Daily    blood sugar diagnostic strip as directed    butalbitaL-acetaminophen  mg capsule Take 1 capsule every 4 hours by oral route as needed.    CALCIUM CARBONATE-VITAMIN D3 ORAL oral    cariprazine (Vraylar) 1.5 mg capsule     cyclobenzaprine (Flexeril) 10 mg tablet Take 1 tablet 3 times a day by oral route.    ergocalciferol (Vitamin D-2) 1.25 MG (47029 UT) capsule TAKE 1 CAPSULE BY MOUTH ONCE WEEKLY    ergocalciferol (Vitamin D-2) 1.25 MG (30617 UT) capsule 1 capsule, oral, Weekly    FLUoxetine (PROzac) 20 mg tablet TAKE 1 TABLET BY MOUTH ONCE DAILY    furosemide (Lasix) 40 mg tablet TAKE 1 TABLET BY MOUTH ONCE DAILY    furosemide (Lasix) 40 mg tablet oral, Daily RT    gabapentin  (Neurontin) 300 mg capsule TAKE 1 CAPSULE BY MOUTH ONCE DAILY    gabapentin (Neurontin) 600 mg tablet TAKE 1 TABLET BY MOUTH ONCE DAILY    galcanezumab (Emgality) 120 mg/mL prefilled syringe INJECT 1 ML UNDER THE SKIN ONCE MONTHLY    lacosamide (Vimpat) 100 mg tablet TAKE 1 TABLET BY MOUTH TWO TIMES A DAY FOR 30 DAYS    lamoTRIgine (LaMICtal) 100 mg tablet     loratadine (Claritin) 10 mg tablet 1 tablet, oral, Daily PRN    losartan (Cozaar) 25 mg tablet TAKE 1 TABLET BY MOUTH ONCE DAILY    meloxicam (Mobic) 15 mg tablet 1 tablet, oral, Daily    methocarbamol (Robaxin) 750 mg tablet TAKE 1 TABLET BY MOUTH EVERY 8 HOURS    metoprolol tartrate (LOPRESSOR) 25 mg, oral, 2 times daily    multivitamin capsule 1 capsule, oral, Daily RT    omeprazole (PriLOSEC) 40 mg DR capsule Take 1 capsule every day by oral route.    Opsumit 10 mg, oral, Daily    potassium chloride CR (Klor-Con) 10 mEq ER tablet 10 mEq, oral, Daily, Do not crush, chew, or split.    rimegepant (NURTEC) 75 mg tablet,disintegrating DISSOLVE 1 TABLET IN MOUTH ON THE TONGUE EVERY OTHER DAY    rivaroxaban (Xarelto) 10 mg tablet TAKE 1 TABLET BY MOUTH ONCE DAILY WITH FOOD    selexipag (UPTRAVI) 1,000 mcg, oral, 2 times daily, Do not crush, chew, or split.    sucralfate (Carafate) 1 gram tablet TAKE 1 TABLET BY MOUTH EVERY 6 HOURS ON AN EMPTY STOMACH    SUMAtriptan (Imitrex) 100 mg tablet TAKE 1 TABLET BY MOUTH TWO TIMES A DAY AS NEEDED    tiZANidine (Zanaflex) 4 mg tablet        Physical Exam:  Physical Exam  Vitals reviewed.   Constitutional:       Appearance: Normal appearance.   Neck:      Vascular: No carotid bruit or JVD.   Cardiovascular:      Rate and Rhythm: Normal rate and regular rhythm.      Pulses: Normal pulses.      Heart sounds: Normal heart sounds, S1 normal and S2 normal.   Pulmonary:      Effort: Pulmonary effort is normal. No respiratory distress.      Breath sounds: No wheezing or rales.   Abdominal:      General: Abdomen is flat.       "Palpations: Abdomen is soft.   Musculoskeletal:      Right lower leg: No edema.      Left lower leg: No edema.   Skin:     General: Skin is warm.   Neurological:      Mental Status: She is alert and oriented to person, place, and time. Mental status is at baseline.   Psychiatric:         Mood and Affect: Mood normal.         Behavior: Behavior normal.           Last Labs:  CBC -  Lab Results   Component Value Date    WBC 4.2 (L) 10/31/2023    HGB 11.5 (L) 10/31/2023    HCT 35.1 (L) 10/31/2023    MCV 86 10/31/2023     10/31/2023       CMP -  Lab Results   Component Value Date    CALCIUM 9.2 10/31/2023    PROT 6.8 08/22/2018    ALBUMIN 4.3 08/22/2018    AST 36 08/22/2018    ALT 43 08/22/2018    ALKPHOS 91 08/22/2018    BILITOT 0.5 08/22/2018       LIPID PANEL -   Lab Results   Component Value Date    CHOL 226 (H) 04/16/2018    TRIG 86 04/16/2018    HDL 60.2 04/16/2018    CHHDL 3.8 04/16/2018    LDLF 149 (H) 04/16/2018    VLDL 17 04/16/2018       RENAL FUNCTION PANEL -   Lab Results   Component Value Date    GLUCOSE 85 10/31/2023     10/31/2023    K 3.4 (L) 10/31/2023     10/31/2023    CO2 24 10/31/2023    ANIONGAP 15 10/31/2023    BUN 10 10/31/2023    CREATININE 0.50 10/31/2023    CALCIUM 9.2 10/31/2023    ALBUMIN 4.3 08/22/2018        Lab Results   Component Value Date    HGBA1C 5.4 05/12/2023       Last Cardiology Tests:  ECG:  No results found for this or any previous visit from the past 1095 days.      Echo:  No results found for this or any previous visit from the past 1095 days.      Ejection Fractions:  No results found for: \"EF\"    Cath:  Cardiac catheterization - non-coronary 10/31/2023      Stress Test:  Cardiopulmonary (Metabolic) Stress Test       Cardiac Imaging:  No results found for this or any previous visit from the past 1095 days.          Assessment/Plan   1-palpitations-symptoms seem secondary to PVCs but she also has prior history of SVTs, nonsustained VT's seen in the current " monitor, as well as PACs.  At this time I opted for increasing the beta-blocker.  She has a structurally normal heart.  I will recheck her electrolytes magnesium and TSH.  I will arrange for a stress test to rule out ischemic heart disease.  If symptoms do not subside with the beta-blockers we will refer her to EP for further evaluation and treatment.    2-presence of permanent pacemaker-unclear why this was implanted seems malignant syncope with cardioinhibitory response need to get records from [place].  Established in device clinic.    3-shortness of breath and chest pain-possibly pulmonary in origin we are ruling out significant coronary artery disease with stress test see above.      Carolina Martin MD

## 2024-02-16 ENCOUNTER — APPOINTMENT (OUTPATIENT)
Dept: CARDIOLOGY | Facility: HOSPITAL | Age: 49
End: 2024-02-16
Payer: COMMERCIAL

## 2024-02-22 PROCEDURE — RXMED WILLOW AMBULATORY MEDICATION CHARGE

## 2024-03-04 ENCOUNTER — PHARMACY VISIT (OUTPATIENT)
Dept: PHARMACY | Facility: CLINIC | Age: 49
End: 2024-03-04
Payer: COMMERCIAL

## 2024-03-06 ASSESSMENT — ENCOUNTER SYMPTOMS
ENDOCRINE NEGATIVE: 1
SHORTNESS OF BREATH: 1
PALPITATIONS: 1
HEMATOLOGIC/LYMPHATIC NEGATIVE: 1
FATIGUE: 1
NEUROLOGICAL NEGATIVE: 1
GASTROINTESTINAL NEGATIVE: 1

## 2024-03-06 NOTE — PROGRESS NOTES
History Of Present Illness  Jazmin Hein is a 49 y.o. female being seen in clinic iCEPT results. She was last seen in clinic on 11/16/2023. Original consult was on 8/14/2023.      RHC/ iCEPT (10/31/2023) At rest PAp: 70/35 (47); PWP: 18; CO/CI 8.1/4.3. with exercise PAp: 90/40 (57); PWP: 20; CO/CI: 11.2/6.1      PAH Treatment: Adempas 2.5mg TID, Opsumit and Uptravi _____BID.  Infusion site: N/A  Treatment history: N/A    03/06/24 Testing today includes echo, 6MWT and labs    Past Medical History  Patient Active Problem List   Diagnosis    Dyspnea on exertion    SOB (shortness of breath) on exertion    Vitamin D deficiency    Ureterolithiasis    Transaminitis    TIA (transient ischemic attack)    Tachycardia    Weakness    Sleep apnea    Chronic pulmonary embolism (CMS/HCC)    Pulmonary hypertension (CMS/HCC)    Post-operative pain    Paresthesia of skin    Dependence on supplemental oxygen    Obstructive sleep apnea of adult    Obesity    Nausea and vomiting    Migraine headache    Hypoglycemia after GI (gastrointestinal) surgery    Lower back pain    Lipids abnormal    Leukopenia    Left arm weakness    Intermittent urinary incontinence    Insomnia    Hypoglycemia    Hypertension    Flushing    HLD (hyperlipidemia)    History of ovarian cancer    Heartburn    Arthritis    Gastric ulcer    Fibromyalgia    Electrolyte and fluid disorder    Drug-induced liver injury    Dizziness    Disease due to severe acute respiratory syndrome coronavirus 2 (SARS-CoV-2)    Daytime sleepiness    Chronic gouty arthritis    Chronic gout of foot due to renal impairment    Cholecystitis, chronic    Bipolar disorder (CMS/HCC)    Bilateral swelling of feet    Bariatric surgery status    Arm paresthesia, left    Allergies    Acute unilateral obstructive uropathy    Abnormal findings on cardiac catheterization    Abnormal echocardiogram    Abdominal right upper quadrant tenderness    Abdominal pain, epigastric    Abdominal pain of  multiple sites    Pacemaker    Chronic respiratory failure with hypoxia (CMS/HCC)    Paroxysmal supraventricular tachycardia    PAC (premature atrial contraction)    PVC (premature ventricular contraction)    NSVT (nonsustained ventricular tachycardia) (CMS/HCC)    Palpitations    Chest pain        Surgical History  She has a past surgical history that includes Hysterectomy (2016); Other surgical history (2016);  section, classic (2016); Gastric bypass (2017); and Cardiac catheterization (N/A, 10/31/2023).     Social History  She reports that she has never smoked. She has never used smokeless tobacco. She reports that she does not currently use alcohol. She reports that she does not use drugs.    Family History  No family history on file.     Medications      Current Outpatient Medications:     Adempas 2.5 mg tablet, Take 1 tablet (2.5 mg) by mouth 3 times a day., Disp: , Rfl:     albuterol (Ventolin HFA) 90 mcg/actuation inhaler, Inhale., Disp: , Rfl:     albuterol 90 mcg/actuation inhaler, INHALE 1 PUFF EVERY 6 HOURS AS NEEDED (Patient not taking: Reported on 2024), Disp: 25.5 g, Rfl: 3    allopurinol (Zyloprim) 100 mg tablet, TAKE 1 TABLET BY MOUTH DAILY, Disp: 90 tablet, Rfl: 3    amitriptyline (Elavil) 50 mg tablet, TAKE 1 TABLET BY MOUTH AT BEDTIME, Disp: 90 tablet, Rfl: 3    ascorbic acid (Vitamin C) 500 mg tablet, Take 1 tablet (500 mg) by mouth once daily., Disp: , Rfl:     blood sugar diagnostic strip, as directed, Disp: , Rfl:     butalbitaL-acetaminophen  mg capsule, Take 1 capsule every 4 hours by oral route as needed., Disp: , Rfl:     CALCIUM CARBONATE-VITAMIN D3 ORAL, Take by mouth., Disp: , Rfl:     cariprazine (Vraylar) 1.5 mg capsule, , Disp: , Rfl:     cyclobenzaprine (Flexeril) 10 mg tablet, Take 1 tablet 3 times a day by oral route., Disp: , Rfl:     ergocalciferol (Vitamin D-2) 1.25 MG (15104 UT) capsule, TAKE 1 CAPSULE BY MOUTH ONCE WEEKLY, Disp: 12  capsule, Rfl: 3    FLUoxetine (PROzac) 20 mg tablet, TAKE 1 TABLET BY MOUTH ONCE DAILY, Disp: 90 tablet, Rfl: 3    furosemide (Lasix) 40 mg tablet, TAKE 1 TABLET BY MOUTH ONCE DAILY, Disp: 90 tablet, Rfl: 3    gabapentin (Neurontin) 300 mg capsule, TAKE 1 CAPSULE BY MOUTH ONCE DAILY, Disp: 90 capsule, Rfl: 3    gabapentin (Neurontin) 600 mg tablet, TAKE 1 TABLET BY MOUTH ONCE DAILY, Disp: 90 tablet, Rfl: 3    galcanezumab (Emgality) 120 mg/mL prefilled syringe, INJECT 1 ML UNDER THE SKIN ONCE MONTHLY, Disp: 3 mL, Rfl: 3    lacosamide (Vimpat) 100 mg tablet, TAKE 1 TABLET BY MOUTH TWO TIMES A DAY FOR 30 DAYS, Disp: 60 each, Rfl: 3    lamoTRIgine (LaMICtal) 100 mg tablet, , Disp: , Rfl:     loratadine (Claritin) 10 mg tablet, Take 1 tablet (10 mg) by mouth once daily as needed., Disp: , Rfl:     losartan (Cozaar) 25 mg tablet, TAKE 1 TABLET BY MOUTH ONCE DAILY, Disp: 90 tablet, Rfl: 3    macitentan (Opsumit) 10 mg tablet tablet, Take 1 tablet (10 mg) by mouth once daily., Disp: , Rfl:     methocarbamol (Robaxin) 750 mg tablet, TAKE 1 TABLET BY MOUTH EVERY 8 HOURS, Disp: 270 tablet, Rfl: 3    metoprolol tartrate (Lopressor) 25 mg tablet, Take 2 tablets (50 mg) by mouth 2 times a day., Disp: 360 tablet, Rfl: 3    multivitamin capsule, Take 1 capsule by mouth once daily., Disp: , Rfl:     omeprazole (PriLOSEC) 40 mg DR capsule, Take 1 capsule every day by oral route., Disp: , Rfl:     potassium chloride CR (Klor-Con) 10 mEq ER tablet, Take 1 tablet (10 mEq) by mouth once daily. Do not crush, chew, or split., Disp: 30 tablet, Rfl: 11    rimegepant (NURTEC) 75 mg tablet,disintegrating, DISSOLVE 1 TABLET IN MOUTH ON THE TONGUE EVERY OTHER DAY, Disp: 96 tablet, Rfl: 0    rivaroxaban (Xarelto) 10 mg tablet, TAKE 1 TABLET BY MOUTH ONCE DAILY WITH FOOD, Disp: 90 tablet, Rfl: 3    selexipag (Uptravi) 1,000 mcg tablet, Take 1 tablet (1,000 mcg) by mouth 2 times a day. Do not crush, chew, or split., Disp: , Rfl:     sucralfate  "(Carafate) 1 gram tablet, TAKE 1 TABLET BY MOUTH EVERY 6 HOURS ON AN EMPTY STOMACH, Disp: 360 tablet, Rfl: 3    SUMAtriptan (Imitrex) 100 mg tablet, TAKE 1 TABLET BY MOUTH TWO TIMES A DAY AS NEEDED, Disp: 60 tablet, Rfl: 11    tiZANidine (Zanaflex) 4 mg tablet, , Disp: , Rfl:      Allergies  Iodine (bulk) and Iodine    Review of Systems   Constitutional:  Positive for fatigue.   HENT: Negative.     Respiratory:  Positive for shortness of breath.    Cardiovascular:  Positive for palpitations.   Gastrointestinal: Negative.    Endocrine: Negative.    Genitourinary: Negative.    Skin: Negative.    Neurological: Negative.    Hematological: Negative.        Last Recorded Vitals  There were no vitals taken for this visit.     Physical Exam  Constitutional:       Appearance: She is obese.   Cardiovascular:      Rate and Rhythm: Normal rate and regular rhythm.      Heart sounds: Murmur (2/6 ANA LSB) heard.   Pulmonary:      Breath sounds: Normal breath sounds.   Abdominal:      General: Bowel sounds are normal.      Palpations: Abdomen is soft.   Musculoskeletal:      Right lower leg: No edema.      Left lower leg: No edema.   Skin:     Findings: No rash.   Neurological:      General: No focal deficit present.   Psychiatric:         Mood and Affect: Mood normal.         Judgment: Judgment normal.       There were no vitals filed for this visit.    [id number]       Relevant Results    6MWT (23)  SpO2: 97% - 95% on RA  HR: 100 - 126  Wilfredo: 1 - 5  Actual 189m       V/Q scan (2023) The perfusion of both lungs is within normal limits with no evidence  for acute pulmonary embolism. Very low probability.           6MWT (2023)  SpO2: 100-97% 4LPM  HR:   Wilfredo-100  Actual meters: 146         RHC (23): PAp 60/ (33), PW 5, C.O. 4.52    V/Q (3/27/23): no discrete ventilation abnormalities, no bronchopulmonary segment conforming perfusion defects; Dr. Brar notes \"segmental perfusion defect in right mid lung " "zones that appear mismatched\"    PFTs (3/20/23): available for review    Echo (3/7/23): moderately enlarged RV with mildly reduced function, RA is upper limits of normal in size, moderate MR, no AR, moderate TR, RVSP 71 mmHg               Assessment/Plan     1) Pulmonary  a. Normal PFTs and DLCO  b. Essentially normal CT  c. Paradoxically reported low oxygen levels although SpO2 walking for me today = 97% on 4l/min, walked on room air 9/14/2023 with desat to 95% on RA.   d. Stated remote (2014) unprovoked PE with recurrence immediately after ceasing anticoagulation. Interpretation of V/Q with ? RML defect (Peripherally) I do not see it. Original interpretation did not see it. It is not common for presentation this many years after PE. Similarly even if there was small peripheral R defect, it does not explain profound report desat and exercise intolerance displayed. SEE BELOW UNDER CARDIOVASCULAR, NEW CATH CONSISTENT WITH PAH.  e. Mild ELENA, AHI =6 from study, lowest SpO2= 87% relatively briefly.      2) Cardiovascular  a. Paroxysmal resting SVT. (+) ZIO patch referred to EP.   b. Original Resting PA mean of 32 reported which was confusing given,  progression to 47 mm HG consistent with IPAH, FC III. Given adempas, needs additional therapy. Exercise cath 47-> 57, no change in PCWP.  c. ? TIA remote.     3) Neuropsych  a. ON multiple med  b. migranes.     May have fatigue elements of long COVID     Plan  1) Event monitor (+) for tachycardia, referred to EP, patient not called, EPIC instituted, re-referred.   2) Add macitentan and selexipag sequentially. Macitentan first, then selexipag in about 4 weeks, follow up 6-8 weeks with 6 MW and echo.              "

## 2024-03-07 ENCOUNTER — CLINICAL SUPPORT (OUTPATIENT)
Dept: EMERGENCY MEDICINE | Facility: HOSPITAL | Age: 49
DRG: 556 | End: 2024-03-07
Payer: COMMERCIAL

## 2024-03-07 ENCOUNTER — TELEPHONE (OUTPATIENT)
Dept: PULMONOLOGY | Facility: HOSPITAL | Age: 49
End: 2024-03-07
Payer: COMMERCIAL

## 2024-03-07 ENCOUNTER — HOSPITAL ENCOUNTER (INPATIENT)
Facility: HOSPITAL | Age: 49
LOS: 11 days | Discharge: HOME | DRG: 556 | End: 2024-03-22
Attending: EMERGENCY MEDICINE | Admitting: STUDENT IN AN ORGANIZED HEALTH CARE EDUCATION/TRAINING PROGRAM
Payer: COMMERCIAL

## 2024-03-07 DIAGNOSIS — R11.2 NAUSEA AND VOMITING, UNSPECIFIED VOMITING TYPE: ICD-10-CM

## 2024-03-07 DIAGNOSIS — Z99.81 DEPENDENCE ON SUPPLEMENTAL OXYGEN: ICD-10-CM

## 2024-03-07 DIAGNOSIS — I27.20 PULMONARY HYPERTENSION (MULTI): ICD-10-CM

## 2024-03-07 DIAGNOSIS — Z95.0 PACEMAKER: ICD-10-CM

## 2024-03-07 DIAGNOSIS — J96.11 CHRONIC RESPIRATORY FAILURE WITH HYPOXIA (MULTI): ICD-10-CM

## 2024-03-07 DIAGNOSIS — T78.40XA ALLERGIC REACTION TO DRUG, INITIAL ENCOUNTER: Primary | ICD-10-CM

## 2024-03-07 LAB
ALBUMIN SERPL BCP-MCNC: 4.2 G/DL (ref 3.4–5)
ALP SERPL-CCNC: 101 U/L (ref 33–110)
ALT SERPL W P-5'-P-CCNC: 15 U/L (ref 7–45)
ANION GAP BLDV CALCULATED.4IONS-SCNC: 10 MMOL/L (ref 10–25)
ANION GAP SERPL CALC-SCNC: 15 MMOL/L (ref 10–20)
AST SERPL W P-5'-P-CCNC: 20 U/L (ref 9–39)
ATRIAL RATE: 64 BPM
BASE EXCESS BLDV CALC-SCNC: 2.4 MMOL/L (ref -2–3)
BASOPHILS # BLD AUTO: 0.01 X10*3/UL (ref 0–0.1)
BASOPHILS NFR BLD AUTO: 0.2 %
BILIRUB SERPL-MCNC: 0.4 MG/DL (ref 0–1.2)
BNP SERPL-MCNC: 96 PG/ML (ref 0–99)
BODY TEMPERATURE: 37 DEGREES CELSIUS
BUN SERPL-MCNC: 8 MG/DL (ref 6–23)
CA-I BLDV-SCNC: 1.12 MMOL/L (ref 1.1–1.33)
CALCIUM SERPL-MCNC: 8.6 MG/DL (ref 8.6–10.6)
CARDIAC TROPONIN I PNL SERPL HS: <3 NG/L (ref 0–34)
CHLORIDE BLDV-SCNC: 104 MMOL/L (ref 98–107)
CHLORIDE SERPL-SCNC: 103 MMOL/L (ref 98–107)
CO2 SERPL-SCNC: 25 MMOL/L (ref 21–32)
CREAT SERPL-MCNC: 0.61 MG/DL (ref 0.5–1.05)
EGFRCR SERPLBLD CKD-EPI 2021: >90 ML/MIN/1.73M*2
EOSINOPHIL # BLD AUTO: 0.07 X10*3/UL (ref 0–0.7)
EOSINOPHIL NFR BLD AUTO: 1.6 %
ERYTHROCYTE [DISTWIDTH] IN BLOOD BY AUTOMATED COUNT: 13.7 % (ref 11.5–14.5)
GLUCOSE BLDV-MCNC: 91 MG/DL (ref 74–99)
GLUCOSE SERPL-MCNC: 89 MG/DL (ref 74–99)
HCO3 BLDV-SCNC: 27.2 MMOL/L (ref 22–26)
HCT VFR BLD AUTO: 34.6 % (ref 36–46)
HCT VFR BLD EST: 38 % (ref 36–46)
HGB BLD-MCNC: 12.3 G/DL (ref 12–16)
HGB BLDV-MCNC: 12.7 G/DL (ref 12–16)
IMM GRANULOCYTES # BLD AUTO: 0.01 X10*3/UL (ref 0–0.7)
IMM GRANULOCYTES NFR BLD AUTO: 0.2 % (ref 0–0.9)
INHALED O2 CONCENTRATION: 36 %
LACTATE BLDV-SCNC: 1.4 MMOL/L (ref 0.4–2)
LYMPHOCYTES # BLD AUTO: 1.34 X10*3/UL (ref 1.2–4.8)
LYMPHOCYTES NFR BLD AUTO: 30 %
MCH RBC QN AUTO: 27.6 PG (ref 26–34)
MCHC RBC AUTO-ENTMCNC: 35.5 G/DL (ref 32–36)
MCV RBC AUTO: 78 FL (ref 80–100)
MONOCYTES # BLD AUTO: 0.3 X10*3/UL (ref 0.1–1)
MONOCYTES NFR BLD AUTO: 6.7 %
NEUTROPHILS # BLD AUTO: 2.74 X10*3/UL (ref 1.2–7.7)
NEUTROPHILS NFR BLD AUTO: 61.3 %
NRBC BLD-RTO: 0 /100 WBCS (ref 0–0)
OXYHGB MFR BLDV: 79.6 % (ref 45–75)
P AXIS: 34 DEGREES
P OFFSET: 176 MS
P ONSET: 128 MS
PCO2 BLDV: 42 MM HG (ref 41–51)
PH BLDV: 7.42 PH (ref 7.33–7.43)
PLATELET # BLD AUTO: 248 X10*3/UL (ref 150–450)
PO2 BLDV: 52 MM HG (ref 35–45)
POTASSIUM BLDV-SCNC: 3.1 MMOL/L (ref 3.5–5.3)
POTASSIUM SERPL-SCNC: 3.1 MMOL/L (ref 3.5–5.3)
PR INTERVAL: 180 MS
PROT SERPL-MCNC: 7 G/DL (ref 6.4–8.2)
Q ONSET: 218 MS
QRS COUNT: 10 BEATS
QRS DURATION: 86 MS
QT INTERVAL: 472 MS
QTC CALCULATION(BAZETT): 486 MS
QTC FREDERICIA: 482 MS
R AXIS: 115 DEGREES
RBC # BLD AUTO: 4.46 X10*6/UL (ref 4–5.2)
SAO2 % BLDV: 81 % (ref 45–75)
SODIUM BLDV-SCNC: 138 MMOL/L (ref 136–145)
SODIUM SERPL-SCNC: 140 MMOL/L (ref 136–145)
T AXIS: 56 DEGREES
T OFFSET: 454 MS
VENTRICULAR RATE: 64 BPM
WBC # BLD AUTO: 4.5 X10*3/UL (ref 4.4–11.3)

## 2024-03-07 PROCEDURE — 99285 EMERGENCY DEPT VISIT HI MDM: CPT | Mod: 25

## 2024-03-07 PROCEDURE — 84132 ASSAY OF SERUM POTASSIUM: CPT

## 2024-03-07 PROCEDURE — 83880 ASSAY OF NATRIURETIC PEPTIDE: CPT

## 2024-03-07 PROCEDURE — 85025 COMPLETE CBC W/AUTO DIFF WBC: CPT

## 2024-03-07 PROCEDURE — 87636 SARSCOV2 & INF A&B AMP PRB: CPT

## 2024-03-07 PROCEDURE — 93010 ELECTROCARDIOGRAM REPORT: CPT

## 2024-03-07 PROCEDURE — 36415 COLL VENOUS BLD VENIPUNCTURE: CPT

## 2024-03-07 PROCEDURE — 2500000004 HC RX 250 GENERAL PHARMACY W/ HCPCS (ALT 636 FOR OP/ED)

## 2024-03-07 PROCEDURE — 2500000005 HC RX 250 GENERAL PHARMACY W/O HCPCS

## 2024-03-07 PROCEDURE — 93005 ELECTROCARDIOGRAM TRACING: CPT

## 2024-03-07 PROCEDURE — 96375 TX/PRO/DX INJ NEW DRUG ADDON: CPT

## 2024-03-07 PROCEDURE — 99285 EMERGENCY DEPT VISIT HI MDM: CPT | Performed by: EMERGENCY MEDICINE

## 2024-03-07 PROCEDURE — 84484 ASSAY OF TROPONIN QUANT: CPT

## 2024-03-07 PROCEDURE — 96361 HYDRATE IV INFUSION ADD-ON: CPT

## 2024-03-07 RX ORDER — ONDANSETRON 4 MG/1
4 TABLET, ORALLY DISINTEGRATING ORAL ONCE
Status: COMPLETED | OUTPATIENT
Start: 2024-03-07 | End: 2024-03-07

## 2024-03-07 RX ORDER — ONDANSETRON HYDROCHLORIDE 2 MG/ML
4 INJECTION, SOLUTION INTRAVENOUS ONCE
Status: COMPLETED | OUTPATIENT
Start: 2024-03-07 | End: 2024-03-07

## 2024-03-07 RX ORDER — HYDROMORPHONE HYDROCHLORIDE 1 MG/ML
0.5 INJECTION, SOLUTION INTRAMUSCULAR; INTRAVENOUS; SUBCUTANEOUS ONCE
Status: COMPLETED | OUTPATIENT
Start: 2024-03-07 | End: 2024-03-07

## 2024-03-07 RX ORDER — ONDANSETRON 4 MG/1
TABLET, ORALLY DISINTEGRATING ORAL
Status: COMPLETED
Start: 2024-03-07 | End: 2024-03-07

## 2024-03-07 RX ADMIN — SODIUM CHLORIDE, POTASSIUM CHLORIDE, SODIUM LACTATE AND CALCIUM CHLORIDE 500 ML: 600; 310; 30; 20 INJECTION, SOLUTION INTRAVENOUS at 21:06

## 2024-03-07 RX ADMIN — ONDANSETRON 4 MG: 4 TABLET, ORALLY DISINTEGRATING ORAL at 19:55

## 2024-03-07 RX ADMIN — HYDROMORPHONE HYDROCHLORIDE 0.5 MG: 1 INJECTION, SOLUTION INTRAMUSCULAR; INTRAVENOUS; SUBCUTANEOUS at 20:15

## 2024-03-07 RX ADMIN — ONDANSETRON 4 MG: 2 INJECTION, SOLUTION INTRAMUSCULAR; INTRAVENOUS at 23:20

## 2024-03-07 ASSESSMENT — PAIN - FUNCTIONAL ASSESSMENT
PAIN_FUNCTIONAL_ASSESSMENT: 0-10

## 2024-03-07 ASSESSMENT — LIFESTYLE VARIABLES
HAVE YOU EVER FELT YOU SHOULD CUT DOWN ON YOUR DRINKING: NO
EVER FELT BAD OR GUILTY ABOUT YOUR DRINKING: NO
HAVE PEOPLE ANNOYED YOU BY CRITICIZING YOUR DRINKING: NO
EVER HAD A DRINK FIRST THING IN THE MORNING TO STEADY YOUR NERVES TO GET RID OF A HANGOVER: NO

## 2024-03-07 ASSESSMENT — PAIN SCALES - GENERAL
PAINLEVEL_OUTOF10: 5 - MODERATE PAIN
PAINLEVEL_OUTOF10: 8
PAINLEVEL_OUTOF10: 8

## 2024-03-07 ASSESSMENT — COLUMBIA-SUICIDE SEVERITY RATING SCALE - C-SSRS
6. HAVE YOU EVER DONE ANYTHING, STARTED TO DO ANYTHING, OR PREPARED TO DO ANYTHING TO END YOUR LIFE?: NO
1. IN THE PAST MONTH, HAVE YOU WISHED YOU WERE DEAD OR WISHED YOU COULD GO TO SLEEP AND NOT WAKE UP?: NO
2. HAVE YOU ACTUALLY HAD ANY THOUGHTS OF KILLING YOURSELF?: NO

## 2024-03-07 ASSESSMENT — PAIN DESCRIPTION - PAIN TYPE: TYPE: ACUTE PAIN

## 2024-03-07 ASSESSMENT — PAIN DESCRIPTION - LOCATION
LOCATION: OTHER (COMMENT)
LOCATION: GENERALIZED

## 2024-03-07 NOTE — ED TRIAGE NOTES
Patient states she believes she is having a reaction to pulmonary hypertension medications Uptravi which she has steadily been increasingunder a doctors care but has had nausea, vomiting and diarrhea for about two weeks as well as dizziness and headaches.

## 2024-03-07 NOTE — TELEPHONE ENCOUNTER
Patient called into office, reported side effects [facility] noted are accurate. States she has been vomiting, having diarrhea despite taking immodium, frequent falls, drowsiness, dizziness. States her metoprolol was recently increased. Has not taken BP/O2 sat. [facility] pharmacy advised to decrease Upravi to 1400 mcq. Dr. Johnson notified - gave patient instructions to go to ED for assessment and management of symptoms. Advised patient she needs to call our office before increasing Uptravi doses to assist in managing side effects. Pt planning to go into ED tonight.

## 2024-03-07 NOTE — TELEPHONE ENCOUNTER
[facility] pharmacy called [facility] office to notify of reported adverse drug SE. Patient currently taking 1600 mcq Uptravi. Reports  two weeks of headache, diarrhea, vomiting, nausea, sore throat d/t vomiting, flushing, weight loss of 12 lb in two weeks, dizziness, falls x2, sore tailbone d/t fall, dark urine, decreased urination, sleepiness, exhaustion, jaw pain, muscle pain. [facility] advised patient to go to ED. Will reach out to patient. Dr. Johnson notified.

## 2024-03-08 PROBLEM — T78.40XA ALLERGIC REACTION TO DRUG, INITIAL ENCOUNTER: Status: ACTIVE | Noted: 2024-03-08

## 2024-03-08 LAB
APPEARANCE UR: CLEAR
BACTERIA #/AREA URNS AUTO: ABNORMAL /HPF
BILIRUB UR STRIP.AUTO-MCNC: NEGATIVE MG/DL
CK SERPL-CCNC: 33 U/L (ref 0–215)
COLOR UR: YELLOW
CRP SERPL-MCNC: 0.19 MG/DL
ERYTHROCYTE [SEDIMENTATION RATE] IN BLOOD BY WESTERGREN METHOD: 8 MM/H (ref 0–20)
FLUAV RNA RESP QL NAA+PROBE: NOT DETECTED
FLUBV RNA RESP QL NAA+PROBE: NOT DETECTED
GLUCOSE UR STRIP.AUTO-MCNC: NORMAL MG/DL
HYALINE CASTS #/AREA URNS AUTO: ABNORMAL /LPF
KETONES UR STRIP.AUTO-MCNC: NEGATIVE MG/DL
LEUKOCYTE ESTERASE UR QL STRIP.AUTO: ABNORMAL
MAGNESIUM SERPL-MCNC: 1.8 MG/DL (ref 1.6–2.4)
MUCOUS THREADS #/AREA URNS AUTO: ABNORMAL /LPF
NITRITE UR QL STRIP.AUTO: NEGATIVE
PH UR STRIP.AUTO: 6 [PH]
PROT UR STRIP.AUTO-MCNC: ABNORMAL MG/DL
RBC # UR STRIP.AUTO: NEGATIVE /UL
RBC #/AREA URNS AUTO: ABNORMAL /HPF
SARS-COV-2 RNA RESP QL NAA+PROBE: NOT DETECTED
SP GR UR STRIP.AUTO: 1.02
SQUAMOUS #/AREA URNS AUTO: ABNORMAL /HPF
TRANS CELLS #/AREA UR COMP ASSIST: ABNORMAL /HPF
URATE SERPL-MCNC: 6.5 MG/DL (ref 2.3–6.7)
UROBILINOGEN UR STRIP.AUTO-MCNC: NORMAL MG/DL
WBC #/AREA URNS AUTO: ABNORMAL /HPF

## 2024-03-08 PROCEDURE — G0378 HOSPITAL OBSERVATION PER HR: HCPCS

## 2024-03-08 PROCEDURE — 85652 RBC SED RATE AUTOMATED: CPT | Performed by: STUDENT IN AN ORGANIZED HEALTH CARE EDUCATION/TRAINING PROGRAM

## 2024-03-08 PROCEDURE — 2500000002 HC RX 250 W HCPCS SELF ADMINISTERED DRUGS (ALT 637 FOR MEDICARE OP, ALT 636 FOR OP/ED): Performed by: STUDENT IN AN ORGANIZED HEALTH CARE EDUCATION/TRAINING PROGRAM

## 2024-03-08 PROCEDURE — 87086 URINE CULTURE/COLONY COUNT: CPT

## 2024-03-08 PROCEDURE — 82550 ASSAY OF CK (CPK): CPT | Performed by: STUDENT IN AN ORGANIZED HEALTH CARE EDUCATION/TRAINING PROGRAM

## 2024-03-08 PROCEDURE — 2500000001 HC RX 250 WO HCPCS SELF ADMINISTERED DRUGS (ALT 637 FOR MEDICARE OP)

## 2024-03-08 PROCEDURE — 99233 SBSQ HOSP IP/OBS HIGH 50: CPT

## 2024-03-08 PROCEDURE — 86140 C-REACTIVE PROTEIN: CPT | Performed by: STUDENT IN AN ORGANIZED HEALTH CARE EDUCATION/TRAINING PROGRAM

## 2024-03-08 PROCEDURE — 84550 ASSAY OF BLOOD/URIC ACID: CPT

## 2024-03-08 PROCEDURE — 2500000004 HC RX 250 GENERAL PHARMACY W/ HCPCS (ALT 636 FOR OP/ED)

## 2024-03-08 PROCEDURE — 96365 THER/PROPH/DIAG IV INF INIT: CPT

## 2024-03-08 PROCEDURE — 81001 URINALYSIS AUTO W/SCOPE: CPT

## 2024-03-08 PROCEDURE — 36415 COLL VENOUS BLD VENIPUNCTURE: CPT | Performed by: STUDENT IN AN ORGANIZED HEALTH CARE EDUCATION/TRAINING PROGRAM

## 2024-03-08 PROCEDURE — 36415 COLL VENOUS BLD VENIPUNCTURE: CPT

## 2024-03-08 PROCEDURE — 2500000005 HC RX 250 GENERAL PHARMACY W/O HCPCS

## 2024-03-08 PROCEDURE — 2500000001 HC RX 250 WO HCPCS SELF ADMINISTERED DRUGS (ALT 637 FOR MEDICARE OP): Performed by: STUDENT IN AN ORGANIZED HEALTH CARE EDUCATION/TRAINING PROGRAM

## 2024-03-08 PROCEDURE — 2500000002 HC RX 250 W HCPCS SELF ADMINISTERED DRUGS (ALT 637 FOR MEDICARE OP, ALT 636 FOR OP/ED)

## 2024-03-08 PROCEDURE — 83735 ASSAY OF MAGNESIUM: CPT | Performed by: STUDENT IN AN ORGANIZED HEALTH CARE EDUCATION/TRAINING PROGRAM

## 2024-03-08 RX ORDER — ALLOPURINOL 100 MG/1
100 TABLET ORAL DAILY
Status: DISCONTINUED | OUTPATIENT
Start: 2024-03-08 | End: 2024-03-22 | Stop reason: HOSPADM

## 2024-03-08 RX ORDER — SUCRALFATE 1 G/1
1 TABLET ORAL EVERY 6 HOURS SCHEDULED
Status: DISCONTINUED | OUTPATIENT
Start: 2024-03-08 | End: 2024-03-22 | Stop reason: HOSPADM

## 2024-03-08 RX ORDER — METHOCARBAMOL 500 MG/1
750 TABLET, FILM COATED ORAL ONCE
Status: COMPLETED | OUTPATIENT
Start: 2024-03-08 | End: 2024-03-08

## 2024-03-08 RX ORDER — PANTOPRAZOLE SODIUM 40 MG/1
40 TABLET, DELAYED RELEASE ORAL
Status: DISCONTINUED | OUTPATIENT
Start: 2024-03-08 | End: 2024-03-22 | Stop reason: HOSPADM

## 2024-03-08 RX ORDER — ASCORBIC ACID 500 MG
500 TABLET ORAL DAILY
Status: DISCONTINUED | OUTPATIENT
Start: 2024-03-08 | End: 2024-03-22 | Stop reason: HOSPADM

## 2024-03-08 RX ORDER — GABAPENTIN 300 MG/1
600 CAPSULE ORAL NIGHTLY
Status: DISCONTINUED | OUTPATIENT
Start: 2024-03-08 | End: 2024-03-22 | Stop reason: HOSPADM

## 2024-03-08 RX ORDER — AMITRIPTYLINE HYDROCHLORIDE 50 MG/1
50 TABLET, FILM COATED ORAL NIGHTLY
Status: DISCONTINUED | OUTPATIENT
Start: 2024-03-08 | End: 2024-03-22 | Stop reason: HOSPADM

## 2024-03-08 RX ORDER — FLUOXETINE 10 MG/1
20 CAPSULE ORAL DAILY
Status: DISCONTINUED | OUTPATIENT
Start: 2024-03-08 | End: 2024-03-22 | Stop reason: HOSPADM

## 2024-03-08 RX ORDER — METOPROLOL TARTRATE 50 MG/1
50 TABLET ORAL 2 TIMES DAILY
Status: DISCONTINUED | OUTPATIENT
Start: 2024-03-08 | End: 2024-03-22 | Stop reason: HOSPADM

## 2024-03-08 RX ORDER — LAMOTRIGINE 100 MG/1
100 TABLET ORAL DAILY
Status: DISCONTINUED | OUTPATIENT
Start: 2024-03-08 | End: 2024-03-22 | Stop reason: HOSPADM

## 2024-03-08 RX ORDER — FUROSEMIDE 40 MG/1
40 TABLET ORAL DAILY
Status: DISCONTINUED | OUTPATIENT
Start: 2024-03-08 | End: 2024-03-08

## 2024-03-08 RX ORDER — POTASSIUM CHLORIDE 14.9 MG/ML
20 INJECTION INTRAVENOUS ONCE
Status: COMPLETED | OUTPATIENT
Start: 2024-03-08 | End: 2024-03-08

## 2024-03-08 RX ORDER — ONDANSETRON 4 MG/1
4 TABLET, FILM COATED ORAL EVERY 4 HOURS PRN
Status: DISCONTINUED | OUTPATIENT
Start: 2024-03-08 | End: 2024-03-22 | Stop reason: HOSPADM

## 2024-03-08 RX ORDER — LORATADINE 10 MG/1
10 TABLET ORAL DAILY PRN
Status: DISCONTINUED | OUTPATIENT
Start: 2024-03-08 | End: 2024-03-17

## 2024-03-08 RX ORDER — ACETAMINOPHEN 10 MG/ML
1000 INJECTION, SOLUTION INTRAVENOUS EVERY 6 HOURS SCHEDULED
Status: COMPLETED | OUTPATIENT
Start: 2024-03-08 | End: 2024-03-09

## 2024-03-08 RX ORDER — SUMATRIPTAN SUCCINATE 100 MG/1
100 TABLET ORAL 2 TIMES DAILY PRN
Status: DISCONTINUED | OUTPATIENT
Start: 2024-03-08 | End: 2024-03-22 | Stop reason: HOSPADM

## 2024-03-08 RX ORDER — GABAPENTIN 300 MG/1
300 CAPSULE ORAL EVERY MORNING
Status: DISCONTINUED | OUTPATIENT
Start: 2024-03-08 | End: 2024-03-22 | Stop reason: HOSPADM

## 2024-03-08 RX ORDER — LACOSAMIDE 100 MG/1
100 TABLET ORAL 2 TIMES DAILY
Status: DISCONTINUED | OUTPATIENT
Start: 2024-03-08 | End: 2024-03-22 | Stop reason: HOSPADM

## 2024-03-08 RX ORDER — ALBUTEROL SULFATE 90 UG/1
1 AEROSOL, METERED RESPIRATORY (INHALATION) EVERY 6 HOURS PRN
Status: DISCONTINUED | OUTPATIENT
Start: 2024-03-08 | End: 2024-03-10

## 2024-03-08 RX ADMIN — SELEXIPAG 1400 MCG: 200 TABLET, COATED ORAL at 21:24

## 2024-03-08 RX ADMIN — ALLOPURINOL 100 MG: 100 TABLET ORAL at 09:23

## 2024-03-08 RX ADMIN — SUCRALFATE 1 G: 1 TABLET ORAL at 09:24

## 2024-03-08 RX ADMIN — ACETAMINOPHEN 1000 MG: 10 INJECTION INTRAVENOUS at 13:56

## 2024-03-08 RX ADMIN — SUCRALFATE 1 G: 1 TABLET ORAL at 21:23

## 2024-03-08 RX ADMIN — AMITRIPTYLINE HYDROCHLORIDE 50 MG: 50 TABLET, FILM COATED ORAL at 21:23

## 2024-03-08 RX ADMIN — FLUOXETINE 20 MG: 10 CAPSULE ORAL at 12:36

## 2024-03-08 RX ADMIN — SODIUM CHLORIDE, POTASSIUM CHLORIDE, SODIUM LACTATE AND CALCIUM CHLORIDE 500 ML: 600; 310; 30; 20 INJECTION, SOLUTION INTRAVENOUS at 12:36

## 2024-03-08 RX ADMIN — LACOSAMIDE 100 MG: 100 TABLET, FILM COATED ORAL at 21:22

## 2024-03-08 RX ADMIN — OXYCODONE HYDROCHLORIDE AND ACETAMINOPHEN 500 MG: 500 TABLET ORAL at 09:23

## 2024-03-08 RX ADMIN — SODIUM CHLORIDE, POTASSIUM CHLORIDE, SODIUM LACTATE AND CALCIUM CHLORIDE 500 ML: 600; 310; 30; 20 INJECTION, SOLUTION INTRAVENOUS at 16:58

## 2024-03-08 RX ADMIN — POTASSIUM CHLORIDE 20 MEQ: 14.9 INJECTION, SOLUTION INTRAVENOUS at 09:24

## 2024-03-08 RX ADMIN — RIOCIGUAT 2.5 MG: 2.5 TABLET, FILM COATED ORAL at 14:00

## 2024-03-08 RX ADMIN — SUCRALFATE 1 G: 1 TABLET ORAL at 15:30

## 2024-03-08 RX ADMIN — METOPROLOL TARTRATE 50 MG: 50 TABLET, FILM COATED ORAL at 09:23

## 2024-03-08 RX ADMIN — METHOCARBAMOL 750 MG: 500 TABLET ORAL at 05:52

## 2024-03-08 RX ADMIN — LACOSAMIDE 100 MG: 100 TABLET, FILM COATED ORAL at 02:54

## 2024-03-08 RX ADMIN — GABAPENTIN 300 MG: 300 CAPSULE ORAL at 09:23

## 2024-03-08 RX ADMIN — RIVAROXABAN 10 MG: 20 TABLET, FILM COATED ORAL at 09:23

## 2024-03-08 RX ADMIN — AMITRIPTYLINE HYDROCHLORIDE 50 MG: 50 TABLET, FILM COATED ORAL at 02:51

## 2024-03-08 RX ADMIN — MACITENTAN 10 MG: 10 TABLET, FILM COATED ORAL at 21:24

## 2024-03-08 RX ADMIN — RIOCIGUAT 2.5 MG: 2.5 TABLET, FILM COATED ORAL at 21:24

## 2024-03-08 RX ADMIN — ACETAMINOPHEN 1000 MG: 10 INJECTION INTRAVENOUS at 21:33

## 2024-03-08 RX ADMIN — SUCRALFATE 1 G: 1 TABLET ORAL at 02:52

## 2024-03-08 RX ADMIN — GABAPENTIN 600 MG: 300 CAPSULE ORAL at 21:21

## 2024-03-08 RX ADMIN — METOPROLOL TARTRATE 50 MG: 50 TABLET, FILM COATED ORAL at 02:51

## 2024-03-08 RX ADMIN — LAMOTRIGINE 100 MG: 100 TABLET ORAL at 09:23

## 2024-03-08 RX ADMIN — METOPROLOL TARTRATE 50 MG: 50 TABLET, FILM COATED ORAL at 21:22

## 2024-03-08 RX ADMIN — LACOSAMIDE 100 MG: 100 TABLET, FILM COATED ORAL at 09:30

## 2024-03-08 RX ADMIN — GABAPENTIN 600 MG: 300 CAPSULE ORAL at 02:51

## 2024-03-08 RX ADMIN — ONDANSETRON HYDROCHLORIDE 4 MG: 4 TABLET, FILM COATED ORAL at 14:49

## 2024-03-08 SDOH — SOCIAL STABILITY: SOCIAL INSECURITY: DO YOU FEEL UNSAFE GOING BACK TO THE PLACE WHERE YOU ARE LIVING?: NO

## 2024-03-08 SDOH — SOCIAL STABILITY: SOCIAL INSECURITY: DOES ANYONE TRY TO KEEP YOU FROM HAVING/CONTACTING OTHER FRIENDS OR DOING THINGS OUTSIDE YOUR HOME?: NO

## 2024-03-08 SDOH — SOCIAL STABILITY: SOCIAL INSECURITY: HAVE YOU HAD THOUGHTS OF HARMING ANYONE ELSE?: NO

## 2024-03-08 SDOH — SOCIAL STABILITY: SOCIAL INSECURITY: HAS ANYONE EVER THREATENED TO HURT YOUR FAMILY OR YOUR PETS?: NO

## 2024-03-08 SDOH — SOCIAL STABILITY: SOCIAL INSECURITY: ARE THERE ANY APPARENT SIGNS OF INJURIES/BEHAVIORS THAT COULD BE RELATED TO ABUSE/NEGLECT?: NO

## 2024-03-08 SDOH — SOCIAL STABILITY: SOCIAL INSECURITY: WERE YOU ABLE TO COMPLETE ALL THE BEHAVIORAL HEALTH SCREENINGS?: YES

## 2024-03-08 SDOH — SOCIAL STABILITY: SOCIAL INSECURITY: DO YOU FEEL ANYONE HAS EXPLOITED OR TAKEN ADVANTAGE OF YOU FINANCIALLY OR OF YOUR PERSONAL PROPERTY?: NO

## 2024-03-08 SDOH — SOCIAL STABILITY: SOCIAL INSECURITY: ARE YOU OR HAVE YOU BEEN THREATENED OR ABUSED PHYSICALLY, EMOTIONALLY, OR SEXUALLY BY ANYONE?: YES

## 2024-03-08 SDOH — SOCIAL STABILITY: SOCIAL INSECURITY: ABUSE: ADULT

## 2024-03-08 ASSESSMENT — ACTIVITIES OF DAILY LIVING (ADL)
ADEQUATE_TO_COMPLETE_ADL: YES
PATIENT'S MEMORY ADEQUATE TO SAFELY COMPLETE DAILY ACTIVITIES?: YES
WALKS IN HOME: INDEPENDENT
BATHING: NEEDS ASSISTANCE
GROOMING: INDEPENDENT
HEARING - RIGHT EAR: FUNCTIONAL
FEEDING YOURSELF: INDEPENDENT
JUDGMENT_ADEQUATE_SAFELY_COMPLETE_DAILY_ACTIVITIES: YES
TOILETING: INDEPENDENT
DRESSING YOURSELF: INDEPENDENT
LACK_OF_TRANSPORTATION: NO
HEARING - LEFT EAR: FUNCTIONAL

## 2024-03-08 ASSESSMENT — COGNITIVE AND FUNCTIONAL STATUS - GENERAL
WALKING IN HOSPITAL ROOM: A LITTLE
DAILY ACTIVITIY SCORE: 22
MOBILITY SCORE: 22
TOILETING: A LITTLE
HELP NEEDED FOR BATHING: A LITTLE
PATIENT BASELINE BEDBOUND: NO
CLIMB 3 TO 5 STEPS WITH RAILING: A LITTLE

## 2024-03-08 ASSESSMENT — LIFESTYLE VARIABLES
HOW MANY STANDARD DRINKS CONTAINING ALCOHOL DO YOU HAVE ON A TYPICAL DAY: PATIENT DOES NOT DRINK
AUDIT-C TOTAL SCORE: 0
AUDIT-C TOTAL SCORE: 0
SKIP TO QUESTIONS 9-10: 1
HOW OFTEN DO YOU HAVE A DRINK CONTAINING ALCOHOL: NEVER
HOW OFTEN DO YOU HAVE 6 OR MORE DRINKS ON ONE OCCASION: NEVER

## 2024-03-08 ASSESSMENT — PAIN DESCRIPTION - LOCATION: LOCATION: GENERALIZED

## 2024-03-08 ASSESSMENT — PAIN SCALES - GENERAL
PAINLEVEL_OUTOF10: 7
PAINLEVEL_OUTOF10: 0 - NO PAIN

## 2024-03-08 ASSESSMENT — PATIENT HEALTH QUESTIONNAIRE - PHQ9
SUM OF ALL RESPONSES TO PHQ9 QUESTIONS 1 & 2: 6
2. FEELING DOWN, DEPRESSED OR HOPELESS: NEARLY EVERY DAY
1. LITTLE INTEREST OR PLEASURE IN DOING THINGS: NEARLY EVERY DAY

## 2024-03-08 ASSESSMENT — PAIN - FUNCTIONAL ASSESSMENT
PAIN_FUNCTIONAL_ASSESSMENT: 0-10
PAIN_FUNCTIONAL_ASSESSMENT: 0-10

## 2024-03-08 NOTE — NURSING NOTE
"-At approximately 1445 patient was noted to have a large (200-300ml) size emesis episode right after lunch meal. Pt stated \"this has been happening to me after each meal for the past two weeks.\" Dr Reinaldo Cespedes made aware via secure chat. Message acknowledged. No new orders received at this time. WCTM.  -At 1500 Dr Cespedes present at bedside assessing patient.  "

## 2024-03-08 NOTE — PROGRESS NOTES
Pharmacy Medication History Review    Jazmin Hein is a 49 y.o. female admitted for Allergic reaction to drug, initial encounter. Pharmacy reviewed the patient's [id number] medications and allergies for accuracy.    The list below reflects the updated PTA list. Comments regarding how patient may be taking medications differently can be found in the Admit Orders Activity  Prior to Admission Medications   Prescriptions Last Dose Informant   Adempas 2.5 mg tablet 3/8/2024 Self   Sig: Take 1 tablet (2.5 mg) by mouth 3 times a day.   FLUoxetine (PROzac) 20 mg tablet 3/8/2024 Self   Sig: TAKE 1 TABLET BY MOUTH ONCE DAILY   SUMAtriptan (Imitrex) 100 mg tablet 3/8/2024 Self   Sig: TAKE 1 TABLET BY MOUTH TWO TIMES A DAY AS NEEDED   albuterol 90 mcg/actuation inhaler  Self   Sig: INHALE 1 PUFF EVERY 6 HOURS AS NEEDED   allopurinol (Zyloprim) 100 mg tablet 3/8/2024 Self   Sig: TAKE 1 TABLET BY MOUTH DAILY   amitriptyline (Elavil) 50 mg tablet 3/8/2024 Self   Sig: TAKE 1 TABLET BY MOUTH AT BEDTIME   ascorbic acid (Vitamin C) 500 mg tablet 3/8/2024 Self   Sig: Take 1 tablet (500 mg) by mouth once daily.   blood sugar diagnostic strip  Self   Sig: as directed   butalbitaL-acetaminophen  mg capsule Has supply at home, uses PRN Self   Sig: Take 1 capsule by mouth every 4 hours if needed (migraine).   cariprazine (Vraylar) 1.5 mg capsule 3/8/2024 Self   Sig: Take 1 capsule (1.5 mg) by mouth once daily.   ergocalciferol (Vitamin D-2) 1.25 MG (65516 UT) capsule  Self   Sig: TAKE 1 CAPSULE BY MOUTH ONCE WEEKLY   furosemide (Lasix) 40 mg tablet 3/8/2024 Self   Sig: TAKE 1 TABLET BY MOUTH ONCE DAILY   gabapentin (Neurontin) 300 mg capsule 3/8/2024 Self   Sig: TAKE 1 CAPSULE BY MOUTH ONCE DAILY   Patient taking differently: Take 1 capsule (300 mg) by mouth once daily in the morning.   gabapentin (Neurontin) 600 mg tablet 3/7/2024 Self   Sig: TAKE 1 TABLET BY MOUTH ONCE DAILY   Patient taking differently: Take 1 tablet  (600 mg) by mouth once daily at bedtime.   galcanezumab (Emgality) 120 mg/mL prefilled syringe  Self   Sig: INJECT 1 ML UNDER THE SKIN ONCE MONTHLY   lacosamide (Vimpat) 100 mg tablet  Self   Sig: TAKE 1 TABLET BY MOUTH TWO TIMES A DAY FOR 30 DAYS   lamoTRIgine (LaMICtal) 100 mg tablet 3/8/2024 Self   Sig: Take 1 tablet (100 mg) by mouth once daily at bedtime.   loratadine (Claritin) 10 mg tablet 3/8/2024 Self   Sig: Take 1 tablet (10 mg) by mouth once daily.   losartan (Cozaar) 25 mg tablet 3/8/2024 Self   Sig: TAKE 1 TABLET BY MOUTH ONCE DAILY   macitentan (Opsumit) 10 mg tablet tablet 3/8/2024 Self   Sig: Take 1 tablet (10 mg) by mouth once daily.   methocarbamol (Robaxin) 750 mg tablet  Self   Sig: TAKE 1 TABLET BY MOUTH EVERY 8 HOURS   metoprolol tartrate (Lopressor) 25 mg tablet 3/8/2024 Self   Sig: Take 2 tablets (50 mg) by mouth 2 times a day.   multivitamin capsule  Self   Sig: Take 1 capsule by mouth once daily.   omeprazole (PriLOSEC) 40 mg DR capsule 3/8/2024 Self   Sig: Take 1 capsule every day by oral route.   potassium chloride CR (Klor-Con) 10 mEq ER tablet Not Taking Self   Sig: Take 1 tablet (10 mEq) by mouth once daily. Do not crush, chew, or split.   Patient not taking: Reported on 3/8/2024   rimegepant (NURTEC) 75 mg tablet,disintegrating 3/8/2024 Self   Sig: DISSOLVE 1 TABLET IN MOUTH ON THE TONGUE EVERY OTHER DAY   rivaroxaban (Xarelto) 10 mg tablet 3/8/2024 Self   Sig: TAKE 1 TABLET BY MOUTH ONCE DAILY WITH FOOD   selexipag (Uptravi) 1,000 mcg tablet 3/8/2024 Self   Sig: Take 1,600 mcg by mouth 2 times a day. Do not crush, chew, or split.   sucralfate (Carafate) 1 gram tablet 3/8/2024 Self   Sig: TAKE 1 TABLET BY MOUTH EVERY 6 HOURS ON AN EMPTY STOMACH      Facility-Administered Medications: None       The list below reflects the updated allergy list. Please review each documented allergy for additional clarification and justification.  Allergies  Reviewed by Florence Marrero RN on  3/7/2024        Severity Reactions Comments    Iodine (bulk) High Anaphylaxis     Iodine Low Rash             Patient accepts M2B at discharge. Pharmacy has been updated to [facility].    Sources used to complete the med history include out patient fill history, OARRS, and patient interview   Reliable historian    Below are additional concerns with the patient's PTA list.  Patient has Adempas, Opsumit, and Uptravi prescriptions with her    Channing Hudson PharmD  Transitions of Care Pharmacist  [facility]  Please reach out via Secure Chat for questions, or if no response call [facility] or [facility]

## 2024-03-08 NOTE — NURSING NOTE
Patient admitted to [place] [address] from the ER. She was oriented to the call light system and her admission screenings were completed. She lives at home with her  and son. She  has a nurse that comes once a week to check in on her from [employer] [place]. She has an oxygen concentrator, wears 4 liters oxygen, has a BP machine, and also a CPAP at home. She stated she needs a little assist with bathing and stairs at this time. She anticipates returning home at discharge time. Will continue to assess for discharge needs.

## 2024-03-08 NOTE — CONSULTS
"Nutrition Initial Assessment:   Nutrition Assessment    Reason for Assessment: Admission nursing screening, Provider consult order  Patient is a 49 y.o. female presenting with an allergic reaction to drug,  being admitted for headaches, vomiting, diarrhea, myalgias, jaw pain, and flushing in the setting of recently titrating up on Uptravi.     MD notes pt with orthostatic hypotension likely r/t volume depletion iso emesis, diarrhea. Her home lasix is being held.     PMHx PAH (Uptravi, Adempas, Opsimut), PPM 2019, SVT/PACs/PVCs on BB, HTN, TBI c/b seizure disorder, gastric ulcers, migraines, ELENA (on CPAP), recurrent PE on Xarelto, bipolar disorder.     Nutrition History:    Energy Intake: Poor < 50 %  Food and Nutrient History: Met with pt this afternoon. She states she has been unable to keep anything down for the past 2 weeks. She states she has been able to drink fluid, but anything solid just comes back up. She has tried \"everything\" including bland foods, crackers, etc. She also endorses frequent diarrhea. She tries not to drink fluids when she eats meals because she has a hx of gastric bypass surgery. She is on a 50oz fluid restriction at home and has been on since February last year. She says today was able to eat some crackers and has not thrown up yet. She was starting to eat her lunch during RD visit which included mashed potatoes, roast, and a cookie. Pt agreeable to ONS- will start with Ensure High Protein once daily to start.   Food Allergies/Intolerances:  mushrooms  GI Symptoms: Diarrhea, Nausea, and Vomiting  Oral Problems: None     MD placed pt on 1500ml fluid restriction. She is hypokalemic and volume depleted. May benefit from discontinuation of fluid restriction until hydration status is improved, however will leave fluid allotment per team's discretion.     Anthropometrics:  Height: 162.6 cm (5' 4\")   Weight: 107 kg (236 lb 8.9 oz)   BMI (Calculated): 40.58  IBW/kg (Dietitian Calculated): 54.5 " kg  Percent of IBW: 197 %     Weight History:   Wt Readings from Last 01 Encounters:   03/08/24 107 kg (236 lb 8.9 oz)     Weight Change %:  Weight History / % Weight Change: pt endorses a 15lb weight loss in 2 weeks, she said her weight has been recently sitting ~ 230lbs.    Nutrition Focused Physical Exam Findings:  Subcutaneous Fat Loss:   Orbital Fat Pads: Mild-Moderate (slight dark circles and slight hollowing)  Buccal Fat Pads: Mild-Moderate (flat cheeks, minimal bounce)  Muscle Wasting:  Temporalis: Mild-Moderate (slight depression)  Pectoralis (Clavicular Region): Well nourished (clavicle not visible)  Deltoid/Trapezius: Well nourished (rounded appearance at arm, shoulder, neck)  Interosseous: Well nourished (muscle bulges)  Quadriceps: Defer  Edema:  Edema: +2 mild (pt states she has BLE edema at baseline 2/2 HF)  Edema Location: BLE  Physical Findings:   None     Nutrition Significant Labs:  BMP Trend:   Results from last 7 days   Lab Units 03/07/24 2007   GLUCOSE mg/dL 89   CALCIUM mg/dL 8.6   SODIUM mmol/L 140   POTASSIUM mmol/L 3.1*   CO2 mmol/L 25   CHLORIDE mmol/L 103   BUN mg/dL 8   CREATININE mg/dL 0.61     Nutrition Specific Medications:  reviewed  I/O:    ;  No intake/output data recorded.  I/O this shift:  In: -   Out: 150 [Urine:150]      Dietary Orders (From admission, onward)       Start     Ordered    03/08/24 0235  Adult diet Regular; 1500 mL fluid  Diet effective now        Question Answer Comment   Diet type Regular    Dietary fluid restriction / 24h: 1500 mL fluid        03/08/24 0235                   Estimated Needs:   Total Energy Estimated Needs (kCal): 2013 kCal  Method for Estimating Needs: MSJ (8375) x 1.2  Total Protein Estimated Needs (g): 82 g  Method for Estimating Needs: 1.5g/kg IBW +  Total Fluid Estimated Needs (mL):  (per MD/ team)    Nutrition Diagnosis   Malnutrition Diagnosis  Patient has Malnutrition Diagnosis: No (pt at low threshold for acute malnutrition however  unable to identify at this time without objective evidence)    Nutrition Diagnosis  Patient has Nutrition Diagnosis: Yes  Diagnosis Status (1): New  Nutrition Diagnosis 1: Inadequate oral intake  Related to (1): inability to consume sufficient energy  As Evidenced by (1): N/V/D       Nutrition Interventions/Recommendations         Nutrition Prescription:   Continue regular diet as tolerated   Fluid allotment per MD/team discretion   Ensure High Protein 1x/day to provide 160kcal and 16gm protein  May trial zone bars if feels comfortable with solid food (RD to provide)  Continue home MVI-M   Start 100mg thiamine x 5-7 days   Obtain Vit D level d/t hx of deficiency   If N/V/D persists and pt remains with inability to tolerate PO, supplemental PN may beneficial to prevent further nutritional decline         Nutrition Interventions:   Interventions: Meals and snacks  Meals and Snacks: General healthful diet  Goal: consume > 50% x 3 meals/day    Nutrition Monitoring and Evaluation   Food/Nutrient Related History Monitoring  Monitoring and Evaluation Plan: Energy intake  Energy Intake: Estimated energy intake  Criteria: > 75% EENs    Body Composition/Growth/Weight History  Monitoring and Evaluation Plan: Weight  Weight: Weight change  Criteria: maintain stable weight    Biochemical Data, Medical Tests and Procedures  Monitoring and Evaluation Plan: Electrolyte/renal panel, Glucose/endocrine profile  Criteria: labs WNL    Nutrition Focused Physical Findings  Monitoring and Evaluation Plan: Digestive System  Digestive System: Diarrhea, Nausea, Vomiting  Criteria: improvement in GI symptoms       Time Spent/Follow-up Reminder:   Time Spent (min): 60 minutes  Last Date of Nutrition Visit: 03/08/24  Nutrition Follow-Up Needed?: Dietitian to reassess per policy  Follow up Comment: ONS 1x/day

## 2024-03-08 NOTE — PROGRESS NOTES
"Jazmin Hein is a 49 y.o. female on day 0 of admission presenting with Allergic reaction to drug, initial encounter.    Subjective   NAEO. Patient doing ok this AM but still not tolerating food or PO well. Endorses myalgias, HA, diarrhea.    Objective   General: lying in NAD, alert  HENT: NCAT, EOMI  Eyes: no scleral icterus  Skin: no suspect lesions or rashes noted on visible skin  Cardiac: RRR, normal S1, S2  Pulm: CTAB, normal respiratory effort, on 4L NC  Abdomen: soft, ND, NT, no involuntary guarding or rebound tenderness  : no indwelling urinary catheter  EXT: no peripheral edema, no asymmetry noted, erythema on medial aspect of L big toe  MSK: no focal joint swelling noted,  Neuro: AOx3, able to follow commands, no gross FND  Psych: coherent thought process, appropriate mood and affect      Last Recorded Vitals  Blood pressure 109/66, pulse 64, temperature 36.8 °C (98.2 °F), resp. rate 16, height 1.626 m (5' 4\"), weight 107 kg (236 lb 8.9 oz), SpO2 99 %.  Intake/Output last 3 Shifts:  No intake/output data recorded.    Relevant Results  Results for orders placed or performed during the hospital encounter of 03/07/24 (from the past 24 hour(s))   CBC and Auto Differential   Result Value Ref Range    WBC 4.5 4.4 - 11.3 x10*3/uL    nRBC 0.0 0.0 - 0.0 /100 WBCs    RBC 4.46 4.00 - 5.20 x10*6/uL    Hemoglobin 12.3 12.0 - 16.0 g/dL    Hematocrit 34.6 (L) 36.0 - 46.0 %    MCV 78 (L) 80 - 100 fL    MCH 27.6 26.0 - 34.0 pg    MCHC 35.5 32.0 - 36.0 g/dL    RDW 13.7 11.5 - 14.5 %    Platelets 248 150 - 450 x10*3/uL    Neutrophils % 61.3 40.0 - 80.0 %    Immature Granulocytes %, Automated 0.2 0.0 - 0.9 %    Lymphocytes % 30.0 13.0 - 44.0 %    Monocytes % 6.7 2.0 - 10.0 %    Eosinophils % 1.6 0.0 - 6.0 %    Basophils % 0.2 0.0 - 2.0 %    Neutrophils Absolute 2.74 1.20 - 7.70 x10*3/uL    Immature Granulocytes Absolute, Automated 0.01 0.00 - 0.70 x10*3/uL    Lymphocytes Absolute 1.34 1.20 - 4.80 x10*3/uL    Monocytes " Absolute 0.30 0.10 - 1.00 x10*3/uL    Eosinophils Absolute 0.07 0.00 - 0.70 x10*3/uL    Basophils Absolute 0.01 0.00 - 0.10 x10*3/uL   Comprehensive metabolic panel   Result Value Ref Range    Glucose 89 74 - 99 mg/dL    Sodium 140 136 - 145 mmol/L    Potassium 3.1 (L) 3.5 - 5.3 mmol/L    Chloride 103 98 - 107 mmol/L    Bicarbonate 25 21 - 32 mmol/L    Anion Gap 15 10 - 20 mmol/L    Urea Nitrogen 8 6 - 23 mg/dL    Creatinine 0.61 0.50 - 1.05 mg/dL    eGFR >90 >60 mL/min/1.73m*2    Calcium 8.6 8.6 - 10.6 mg/dL    Albumin 4.2 3.4 - 5.0 g/dL    Alkaline Phosphatase 101 33 - 110 U/L    Total Protein 7.0 6.4 - 8.2 g/dL    AST 20 9 - 39 U/L    Bilirubin, Total 0.4 0.0 - 1.2 mg/dL    ALT 15 7 - 45 U/L   Troponin I, High Sensitivity   Result Value Ref Range    Troponin I, High Sensitivity <3 0 - 34 ng/L   B-Type Natriuretic Peptide   Result Value Ref Range    BNP 96 0 - 99 pg/mL   Blood Gas Venous Full Panel   Result Value Ref Range    POCT pH, Venous 7.42 7.33 - 7.43 pH    POCT pCO2, Venous 42 41 - 51 mm Hg    POCT pO2, Venous 52 (H) 35 - 45 mm Hg    POCT SO2, Venous 81 (H) 45 - 75 %    POCT Oxy Hemoglobin, Venous 79.6 (H) 45.0 - 75.0 %    POCT Hematocrit Calculated, Venous 38.0 36.0 - 46.0 %    POCT Sodium, Venous 138 136 - 145 mmol/L    POCT Potassium, Venous 3.1 (L) 3.5 - 5.3 mmol/L    POCT Chloride, Venous 104 98 - 107 mmol/L    POCT Ionized Calicum, Venous 1.12 1.10 - 1.33 mmol/L    POCT Glucose, Venous 91 74 - 99 mg/dL    POCT Lactate, Venous 1.4 0.4 - 2.0 mmol/L    POCT Base Excess, Venous 2.4 -2.0 - 3.0 mmol/L    POCT HCO3 Calculated, Venous 27.2 (H) 22.0 - 26.0 mmol/L    POCT Hemoglobin, Venous 12.7 12.0 - 16.0 g/dL    POCT Anion Gap, Venous 10.0 10.0 - 25.0 mmol/L    Patient Temperature 37.0 degrees Celsius    FiO2 36 %   Sars-CoV-2 and Influenza A/B PCR   Result Value Ref Range    Flu A Result Not Detected Not Detected    Flu B Result Not Detected Not Detected    Coronavirus 2019, PCR Not Detected Not Detected    ECG 12 lead   Result Value Ref Range    Ventricular Rate 64 BPM    Atrial Rate 64 BPM    MD Interval 180 ms    QRS Duration 86 ms    QT Interval 472 ms    QTC Calculation(Bazett) 486 ms    P Axis 34 degrees    R Axis 115 degrees    T Axis 56 degrees    QRS Count 10 beats    Q Onset 218 ms    P Onset 128 ms    P Offset 176 ms    T Offset 454 ms    QTC Fredericia 482 ms   Uric Acid   Result Value Ref Range    Uric Acid 6.5 2.3 - 6.7 mg/dL   Magnesium   Result Value Ref Range    Magnesium 1.80 1.60 - 2.40 mg/dL   Creatine Kinase   Result Value Ref Range    Creatine Kinase 33 0 - 215 U/L   C-reactive protein   Result Value Ref Range    C-Reactive Protein 0.19 <1.00 mg/dL   Urinalysis with Reflex Culture and Microscopic   Result Value Ref Range    Color, Urine Yellow Light-Yellow, Yellow, Dark-Yellow    Appearance, Urine Clear Clear    Specific Gravity, Urine 1.023 1.005 - 1.035    pH, Urine 6.0 5.0, 5.5, 6.0, 6.5, 7.0, 7.5, 8.0    Protein, Urine 30 (1+) (A) NEGATIVE, 10 (TRACE), 20 (TRACE) mg/dL    Glucose, Urine Normal Normal mg/dL    Blood, Urine NEGATIVE NEGATIVE    Ketones, Urine NEGATIVE NEGATIVE mg/dL    Bilirubin, Urine NEGATIVE NEGATIVE    Urobilinogen, Urine Normal Normal mg/dL    Nitrite, Urine NEGATIVE NEGATIVE    Leukocyte Esterase, Urine 75 Tyler/µL (A) NEGATIVE   Microscopic Only, Urine   Result Value Ref Range    WBC, Urine 11-20 (A) 1-5, NONE /HPF    RBC, Urine NONE NONE, 1-2, 3-5 /HPF    Squamous Epithelial Cells, Urine 1-9 (SPARSE) Reference range not established. /HPF    Transitional Epithelial Cells, Urine 1-2 (FEW) Reference range not established. /HPF    Bacteria, Urine 1+ (A) NONE SEEN /HPF    Mucus, Urine 2+ Reference range not established. /LPF    Hyaline Casts, Urine 3+ (A) NONE /LPF       Assessment/Plan   Principal Problem:    Allergic reaction to drug, initial encounter    Jazmin Hein is a 49 y.o. female w/ PMHx PAH (Uptravi, Adempas, Opsimut), PPM 2019, SVT/PACs/PVCs on BB, HTN, TBI  c/b seizure disorder, gastric ulcers, migraines, ELENA (on CPAP), recurrent PE on Xarelto, bipolar disorder, being admitted for headaches, vomiting, diarrhea, myalgias, jaw pain, and flushing in the setting of recently titrating up on Uptravi. Likely having orthostatic hypotension as a result of vomiting/diarrhea/poor PO intake and had 2 falls recently. She remains HDS, although BP softer, on her baseline O2 requirement of 4L. Plan to discuss with Dr. Johnson in the AM Uptravi dosing. She was given 500 ml in ED and will plan to hold home lasix for now in setting of volume depletion. Will ctm volume status and ability to tolerate PO.    UPDATES 3/8/24  - Rebolused w/ 500 ml LR in s/o continued asymptomatic hypotension  - IV tylenol for 24 hours until tolerating PO  - Zofran PO started in attempt to encourage PO  - Dr. Johnson contacted and recommended returning to previous UPTRAVI dose of 1400 micrograms BID  - Flu / covid negative     #PAH  #RV failure  ::TTE (9/15/23): EF 65-70%, RV pressure, overload, impaired relaxation of LV diastolic filling, moderately elevated PAP, RVSP 62  ::RHC (10/31/2023) At rest PAp: 70/35 (47); PWP: 18; CO/CI 8.1/4.3. with exercise PAp: 90/40 (57); PWP: 20; CO/CI: 11.2/6.1   ::On 4L at baseline  -Home regimen: Adempas 2.5 mg TID, Opsumit 10 mg daily, Uptravi 1600 mcg bid - >Transitioned to 1400 micrograms per Dr. Johnson  -Hold Lasix 40 mg daily     #Diarrhea  #Hypokalemia  ::Likely 2/2 Uptravi  -Check stool pathogen panel  -Monitor electrolytes, replete PRN in setting of diarrhea     #Nausea, vomiting 2/2 Uptravi  -Qtc 486, spot dose Zofran     #Orthostatic hypotension  ::Likely volume depleted iso emesis, diarrhea  ::s/p 2x 500 ml IVF given in ED  -Obtain orthostats in AM  -Hold home lasix for now     #HTN  -Hold Losartan 25 mg daily iso softer BP     #Gout flare?   -Continue Allopurinol 100 mg daily  -Would avoid colchicine in setting of diarrhea and would avoid NSAIDs given known  ulcers  -Obtain uric acid level. Consider starting steroid if elevated     #Migraines  #Seizures  #Bipolar disorder  -C/w home Amitriptyline 50 mg at bedtime, Prozac 20 mg daily  -C/w home Vraylar 1.5 mg -need to contact psych to continue  -C/w home Vimpat 100 mg bid  -C/w home Lamotrigine 100 mg daily  -Hold Rimegepant 75 mg every other day and Sumatriptan 100 mg bid prn given not currently having migraines  -Hold monthly Emgality 1 ml injections     #Hx SVTs/PACs/PVCs/NSVT  ::Event recorder outpatient revealing PVCs, NSVT, PACs  -Follows with cardiology outpatient, arranging for stress test as outpatient to rule out ischemic dz and referred to EP  -Tele  -Metop tartrate 50 mg BID, consider holding if softer Bps  -K>4, Mg>2     #Hx gastric ulcers  -Omeprazole 40 mg daily->panto 40 mg  -Sucralfate 1g q6h      #Recurrent PEs  ::unprovoked in 2014, recurrence after stopping AC  -C/w home Xarelto 10 mg daily     #Mild ELENA  -RT c/s for nightly CPAP     #Allergies: Loratidine 10 mg daily prn  #Neuropathic pain: C/w home Gabapentin 300 mg every morning, 600 every night  #Misc: c/w home vit C     F: cautious pHTN  E: replete PRN  N: regular, 1.5L fluid restriction  A: PIV    DVT PPx: xarelto   GI PPx: PPI     CODE STATUS: DNR/DNI (confirmed on admission)  POA: Addi () [phone]            Reinaldo Cespedes MD PhD  PGY1 Medicine

## 2024-03-08 NOTE — ED PROVIDER NOTES
"HPI   Chief Complaint   Patient presents with    Allergic Reaction       Patient is a 49-year-old female with history of pulmonary arterial hypertension presenting to the emergency department with concern for an allergic reaction.  Patient was recently started on Uptravi and takes 1600 mcg daily.  Patient reports that since she has started that medication she has endorsed 2 weeks of headache, diarrhea, vomiting, nausea, sore throat secondary to vomiting, skin flushing and open total weight loss of 15 pounds.  Is also endorsing of dizziness, feeling dehydrated as she is on a strict fluid restriction of 50 ounces daily.  Reports that her urine is dark and \"smells bad.\"Also reports exhaustion, jaw pain myalgias and subjective fevers but has not had a true elevated fever at home.  Reports that her new medication is intended to be uptitrated over time and it has been doubled since she initially started.  Patient is a patient of Dr. Ryan and is aware that she is coming to the emergency department.  Denies any affiliated shortness of breath or chest pain.  Is on a baseline oxygen requirement of 4 L NC with no changes in her respiratory status.  Is also endorsing a headache.                          [place] Coma Scale Score: 15                     Patient History   Past Medical History:   Diagnosis Date    Heart failure (CMS/HCC)     Hypoglycemia     Personal history of other diseases of the female genital tract 2016    History of endometriosis    Personal history of pulmonary embolism 2016    History of pulmonary embolism     Past Surgical History:   Procedure Laterality Date    CARDIAC CATHETERIZATION N/A 10/31/2023    Procedure: Right Heart Cath With Exercise;  Surgeon: Benjamin Holt MD;  Location: [place] [address] [facility];  Service: Cardiovascular;  Laterality: N/A;  iCPET with Bimal Miguel. Needs to be in AM per [facility].     SECTION, CLASSIC  2016     Section    GASTRIC BYPASS  " 02/20/2017    Gastric Surgery For Morbid Obesity Bypass With [place]-en-Y    HYSTERECTOMY  07/05/2016    Hysterectomy    OTHER SURGICAL HISTORY  07/05/2016    Gynecologic Laparoscopy With Adhesiolysis Fallopian Tubes     No family history on file.  Social History     Tobacco Use    Smoking status: Never    Smokeless tobacco: Never   Substance Use Topics    Alcohol use: Not Currently    Drug use: Yes     Types: Marijuana       Physical Exam   ED Triage Vitals   Temperature Heart Rate Respirations BP   03/07/24 1853 03/07/24 1853 03/07/24 1853 03/07/24 1853   37.3 °C (99.1 °F) 83 16 95/61      Pulse Ox Temp Source Heart Rate Source Patient Position   03/07/24 1853 03/07/24 1925 -- --   95 % Oral        BP Location FiO2 (%)     -- 03/07/24 1853      36 %       Physical Exam  Vitals and nursing note reviewed.   Constitutional:       General: She is not in acute distress.     Appearance: She is ill-appearing.   HENT:      Head: Normocephalic.      Mouth/Throat:      Mouth: Mucous membranes are moist.   Eyes:      Conjunctiva/sclera: Conjunctivae normal.      Pupils: Pupils are equal, round, and reactive to light.   Cardiovascular:      Rate and Rhythm: Normal rate and regular rhythm.      Pulses:           Radial pulses are 2+ on the right side and 2+ on the left side.   Pulmonary:      Effort: Pulmonary effort is normal. No respiratory distress.      Breath sounds: Normal breath sounds.   Abdominal:      General: Abdomen is flat.      Palpations: Abdomen is soft.      Tenderness: There is no abdominal tenderness. There is no guarding or rebound.   Musculoskeletal:      Cervical back: Normal range of motion and neck supple.      Right lower leg: No edema.      Left lower leg: No edema.   Skin:     General: Skin is warm.   Neurological:      Mental Status: She is alert and oriented to person, place, and time.   Psychiatric:         Mood and Affect: Mood normal.         ED Course & MDM   Diagnoses as of 03/08/24 1232    Allergic reaction to drug, initial encounter       Medical Decision Making  49-year-old female with pulmonary hypertension presenting to emergency department with concern for allergic reaction to her pulmonary hypertension medication.  On physical exam, patient is hemodynamically stable and in no acute distress, has no oxygen requirements from her baseline of 4 L.  Labs ordered to rule out underlying source of infection including urinalysis.  Patient given a small bolus of IV fluids.  EKG is nonischemic.  I suspect patient's symptoms today are in the setting of a serum sickness related to hypersensitivity reaction to her medication.  Patient is not septic appearing, afebrile and hemodynamically stable so low concern for an infectious cause, has no increase in O2 requirements and without constitutional symptoms so low concern for cardiopulmonary cause as well.  On reevaluation, patient reports somewhat improved and her symptoms after receiving fluids and Tylenol.  Consulted with pulmonology who is aware the patient's in the emergency department and recommends admission to evaluate if the medication she is currently on is the best medication for her.  Patient updated with these results and is agreeable with plan for admission to pulmonology for observation.  Patient admitted in stable condition.        Procedure  Procedures     Isabell Landaverde DO  Resident  03/08/24 8016

## 2024-03-08 NOTE — CARE PLAN
The patient's goals for the shift include  patient will have reduced N/V throughout day shift    The clinical goals for the shift include patient will be free from innjury and falls    Over the shift, the patient did not make progress toward the following goals. Barriers to progression include patient has history of vomiting for the past two weeks. . Recommendations to address these barriers include administer antiemetic medications as scheduled, monitor V/S, report any changes to MD.

## 2024-03-08 NOTE — H&P
History Of Present Illness  Jazmin Hein is a 49 y.o. female w/ PMHx PAH (on Uptravi, Adempas, Opsimut), PPM 2019, SVT/PACs/PVCs on BB, HTN, TBI c/b seizure disorder, gastric ulcers, migraines, ELENA (on CPAP), recurrent PEs on Xarelto, bipolar disorder, being admitted for headaches, vomiting, diarrhea, myalgias, jaw pain likely secondary to recently increased dose of Uptravi.     Pt reports she has been titrating up on her Uptravi, on 2/20/23 increased dose from 1400 and 1600. Typically titrates her doses up on Tues, has side effects for next 3 days and feels better by the weekend. After most recent titration, side effects persisted. She called pHTN clinic who recommended she present to ED.   Endorsing headaches, emesis, nonbloody diarrhea, myalgias, jaw pain, flushing. Has had poor PO intake, was able to eat white rice/broth a few days ago other than that not able to keep anything down. Has lost approximately 15 lb. States that diarrhea has been nonstop. Also noted she had less UOP 2 days ago and feels it's darker. Denies dysuria. Has 50 oz fluid restriction.     Uses 4L NC, has not had any increasing O2 requirements. Noted that she had 2 falls in the past week. Did not hit her head or have LOC. Has minimal buttock pain. Notes that both falls occurred when sitting -> standing.     Also feels she may be having a gout flare in L toe for past several weeks. Endorsing pain and difficulty moving joint. Per EMR, ran out of allopurinol in 12/2023 and was having symptoms at that time.     ED course:  VS: T 99.1  BP 95/61 HR 83 RR 16 O2 4L NC (baseline)     Labs:  CBC WBC 4.5 Hb 12.3 Plt 248  RFP Na 140 K 3.1 Cl 103 HCO3 25 BUN 8 Scr 0.6 Glc 89 Ca 8.6  LFTs AST/ALT 20/15 Alk Phos 101 T Bili 0.4 Alb 4.2 Tprot 7  UA ordered  Trop HS <3  BNP 96  VBG: pH 7.42, CO2 42, lactate 1.4  Flu A/B, covid neg    Imaging:   EKG NSR, right axis deviation    Interventions:   Zofran 4 mg IV, PO Zofran 4 mg x2,  ml bolus, dilaudid  "0.5 mg,     Pulm hx:   -Follows with Dr. Johnson, per his note:  TTE (9/15/23): EF 65-70%, RV pressure, overload, impaired relaxation of LV diastolic filling, moderately elevated PAP, RVSP 62    TTE (3/7/23): moderately enlarged RV with mildly reduced function, RA is upper limits of normal in size, moderate MR, no AR, moderate TR, RVSP 71 mmHg     RHC (10/31/2023) At rest PAp: 70/35 (47); PWP: 18; CO/CI 8.1/4.3. with exercise PAp: 90/40 (57); PWP: 20; CO/CI: 11.2/6.1     RHC (5/2/23): PAp 60/23 (33), PW 5, C.O. 4.52     V/Q scan (8/28/2023) The perfusion of both lungs is within normal limits with no evidence for acute pulmonary embolism. Very low probability.    V/Q (3/27/23): no discrete ventilation abnormalities, no bronchopulmonary segment conforming perfusion defects; Dr. Brar notes \"segmental perfusion defect in right mid lung zones that appear mismatched\"     PAST SURGICAL HISTORY:  Ovarian cancer s/p total hysterectomy ~30 years ago  Gastric bypass 2017  R knee replacement 2018    MEDICATIONS:  Adempas 2.5 mg TID  Albuterol inhaler  Allopurinol 100 mg daily  Amitriptyline 50 mg at bedtime  Vit C  Calcium carbonate-Vit D3  Vraylar 1.5 mg   Flexeril 10 mg TID  Ergocalciferol 1.25 mg   Prozac 20 mg daily  Lasix 40 mg daily  Gabapentin 300 mg every morning, 600 every night  Emgality 1 ml injections monthly  Vimpat 100 mg bid  Lamotrigine 100 mg daily  Loratidine 10 mg daily prn  Losartan 25 mg daily  Opsumit 10 mg daily  Robaxin 750 mg q8h prn   Metop tartrate 50 mg BID  MVI  Omeprazole 40 mg daily  Kcl 10 meq  Rimegepant 75 mg every other day  Xarelto 10 mg daily  Uptravi 1000 mcg bid  Sucralfate 1g q6h   Sumatriptan 100 mg bid prn    SOCIAL HX:  - Living Situation: lives with , moved from [place] to [facility] 7/2023  - Functional Status: does not typically exert herself  - Tobacco: smoked from age [age]  - Alcohol: rare  - Drugs: medical MJ     Past Medical History  Past Medical History:   Diagnosis Date    Heart " "failure (CMS/HCC)     Hypoglycemia     Personal history of other diseases of the female genital tract 2016    History of endometriosis    Personal history of pulmonary embolism 2016    History of pulmonary embolism       Surgical History  Past Surgical History:   Procedure Laterality Date    CARDIAC CATHETERIZATION N/A 10/31/2023    Procedure: Right Heart Cath With Exercise;  Surgeon: Benjamin Holt MD;  Location: [place] [address] Cardiac Cath Lab;  Service: Cardiovascular;  Laterality: N/A;  iCPET with Bimalcinthia Miguel. Needs to be in AM per [facility].     SECTION, CLASSIC  2016     Section    GASTRIC BYPASS  2017    Gastric Surgery For Morbid Obesity Bypass With [place]-en-Y    HYSTERECTOMY  2016    Hysterectomy    OTHER SURGICAL HISTORY  2016    Gynecologic Laparoscopy With Adhesiolysis Fallopian Tubes        Social History  She reports that she has never smoked. She has never used smokeless tobacco. She reports that she does not currently use alcohol. She reports current drug use. Drug: Marijuana.    Family History  No family history on file.     Allergies  Iodine (bulk) and Iodine     Last Recorded Vitals  Blood pressure 114/61, pulse 70, temperature 36.6 °C (97.9 °F), temperature source Oral, resp. rate 18, height 1.626 m (5' 4\"), weight 104 kg (230 lb), SpO2 99 %.    General: sleeping, in NAD, obese  HENT: NCAT, EOMI  Eyes: no scleral icterus  Skin: no suspect lesions or rashes noted on visible skin  Cardiac: RRR, normal S1, S2  Pulm: CTAB, normal respiratory effort, on 4L NC  Abdomen: soft, ND, NT, no involuntary guarding or rebound tenderness  : no indwelling urinary catheter  EXT: no peripheral edema, no asymmetry noted, erythema on medial aspect of L big toe  MSK: no focal joint swelling noted,  Neuro: AOx3, able to follow commands, no gross FND  Psych: coherent thought process, appropriate mood and affect     Assessment/Plan   Jazmin Hein is a 49 y.o. female w/ " PMHx PAH (Uptravi, Adempas, Opsimut), PPM 2019, SVT/PACs/PVCs on BB, HTN, TBI c/b seizure disorder, gastric ulcers, migraines, ELENA (on CPAP), recurrent PE on Xarelto, bipolar disorder, being admitted for headaches, vomiting, diarrhea, myalgias, jaw pain, and flushing in the setting of recently titrating up on Uptravi. Likely having orthostatic hypotension as a result of vomiting/diarrhea/poor PO intake and had 2 falls recently. She remains HDS, although BP softer, on her baseline O2 requirement of 4L. Plan to discuss with Dr. Johnson in the AM Uptravi dosing. She was given 500 ml in ED and will plan to hold home lasix for now in setting of volume depletion.     #PAH  #RV failure  ::TTE (9/15/23): EF 65-70%, RV pressure, overload, impaired relaxation of LV diastolic filling, moderately elevated PAP, RVSP 62  ::RHC (10/31/2023) At rest PAp: 70/35 (47); PWP: 18; CO/CI 8.1/4.3. with exercise PAp: 90/40 (57); PWP: 20; CO/CI: 11.2/6.1   ::On 4L at baseline  -Home regimen: Adempas 2.5 mg TID, Opsumit 10 mg daily, Uptravi 1600 mcg bid  -Contacted pulm fellow to order above medications, consider decreasing Uptravi dose after speaking with Dr. Johnson in AM  -Hold Lasix 40 mg daily    #Diarrhea  #Hypokalemia  ::Likely 2/2 Uptravi  -Check stool pathogen panel  -Monitor electrolytes, replete PRN in setting of diarrhea    #Nausea, vomiting 2/2 Uptravi  -Qtc 486, spot dose Zofran    #Orthostatic hypotension  ::Likely volume depleted iso emesis, diarrhea  ::s/p 500 ml IVF given in ED  -Obtain orthostats in AM  -Hold home lasix for now    #HTN  -Hold Losartan 25 mg daily iso softer BP    #Gout flare?   -Continue Allopurinol 100 mg daily  -Would avoid colchicine in setting of diarrhea and would avoid NSAIDs given known ulcers  -Obtain uric acid level. Consider starting steroid if elevated    #Migraines  #Seizures  #Bipolar disorder  -C/w home Amitriptyline 50 mg at bedtime, Prozac 20 mg daily  -C/w home Vraylar 1.5 mg -need to contact  psych to continue  -C/w home Vimpat 100 mg bid  -C/w home Lamotrigine 100 mg daily  -Hold Rimegepant 75 mg every other day and Sumatriptan 100 mg bid prn given not currently having migraines  -Hold monthly Emgality 1 ml injections    #Hx SVTs/PACs/PVCs/NSVT  ::Event recorder outpatient revealing PVCs, NSVT, PACs  -Follows with cardiology outpatient, arranging for stress test as outpatient to rule out ischemic dz and referred to EP  -Tele  -Metop tartrate 50 mg BID, consider holding if softer Bps  -K>4, Mg>2    #Hx gastric ulcers  -Omeprazole 40 mg daily->panto 40 mg  -Sucralfate 1g q6h     #Recurrent PEs  ::unprovoked in 2014, recurrence after stopping AC  -C/w home Xarelto 10 mg daily    #Mild ELENA  -RT c/s for nightly CPAP    #Allergies: Loratidine 10 mg daily prn  #Neuropathic pain: C/w home Gabapentin 300 mg every morning, 600 every night  #Misc: c/w home vit C    F: cautious pHTN  E: replete PRN  N: regular, 1.5L fluid restriction  A: PIV    DVT PPx: xarelto   GI PPx: PPI    CODE STATUS: DNR/DNI (confirmed on admission)  POA: Addi () [phone]     Jose Pham MD

## 2024-03-08 NOTE — PROGRESS NOTES
3/8/24 5596 Transitional Care Coordinator Notes:    Assessment Note: Met with patient and spouse at the bedside to discuss discharge needs. Patient stated she has a nurse that comes weekly through [facility]. Patient is up and independent. Patient denies any financial or social work needs. Spouse will transport patient home at discharge.    Patient is admitted for pulmonary hypertension. She is here for medical management.    Home Care: [facility], skilled nursing weekly  DME: shower chair/[employer] for home oxygen  PCP: Dr. Osmar Lambert  Pharmacy:  [facility]  Falls: denies  Dialysis: denies                       Assessment/Plan   Principal Problem:    Allergic reaction to drug, initial encounter    Discharge Plans: discharge to home           Paola Nash RN

## 2024-03-08 NOTE — HOSPITAL COURSE
Ms. Hein was admitted to the [facility] on 3/08/24 for N/V/D, HA, myalgias in s/o increasing selexipag dosage for pulmonary HTN. She endorsed the same symptoms for each uptitration, but the symptoms dissipate within 2-3 days. Currently endorsing the symptoms for 2-3 weeks w/ difficult tolerating PO and 10-15 lb weight loss. In the ED, patient was initiated on IV tylenol and zofran and rehydrated for soft BP. Selexipag (Uptravi) dosage was decreased to 1400 from 1600 and symptoms were monitored. Throughout stay, symptoms slowly dissipated but SOB persisted. Restarted diuresis day to day w/ 40 mg IV lasix pushes while monitoring RFP and UO. Additionally, patient required decrease Selexipag dosing to 600 micrograms BID w/ improvement of symptoms. Gastroenterology was consulted as selexipag dosing was being down titrated and assessed pt to be having side effects from selexipag and rec'd continued use of zofran with caution. Qtc was frequently monitored on EKGs with a peak Qtc of 502 on 3/19/24 (at which time pt was receiving zofran 3 times a day  minutes before each meal) and decreased back down to 491 on discharge day when the day before she was encouraged to only take zofran if she got nauseous (and only received Zofran day previous 3/21 for breakfast). Patient able to ambulate at baseline functional status and tolerate PO w/o vomiting on 3/22/24 and ok for discharge w/ follow up w/ Dr. Johnson' clinic. Restarted on home medications including home diuretic 40 mg PO Lasix. TTE was performed without any acute changes and stable moderate MR, consistent elevated RV pressure and RA dilation.    Pt was encouraged to only use zofran in the future if needed for nausea/vomiting gets severe and to try to limit use to once a day.

## 2024-03-09 LAB
ALBUMIN SERPL BCP-MCNC: 3.4 G/DL (ref 3.4–5)
ANION GAP SERPL CALC-SCNC: 12 MMOL/L (ref 10–20)
BACTERIA UR CULT: NORMAL
BASOPHILS # BLD AUTO: 0.01 X10*3/UL (ref 0–0.1)
BASOPHILS NFR BLD AUTO: 0.3 %
BUN SERPL-MCNC: 11 MG/DL (ref 6–23)
CALCIUM SERPL-MCNC: 8 MG/DL (ref 8.6–10.6)
CHLORIDE SERPL-SCNC: 105 MMOL/L (ref 98–107)
CO2 SERPL-SCNC: 26 MMOL/L (ref 21–32)
CREAT SERPL-MCNC: 0.65 MG/DL (ref 0.5–1.05)
EGFRCR SERPLBLD CKD-EPI 2021: >90 ML/MIN/1.73M*2
EOSINOPHIL # BLD AUTO: 0.08 X10*3/UL (ref 0–0.7)
EOSINOPHIL NFR BLD AUTO: 2.4 %
ERYTHROCYTE [DISTWIDTH] IN BLOOD BY AUTOMATED COUNT: 14 % (ref 11.5–14.5)
GLUCOSE SERPL-MCNC: 102 MG/DL (ref 74–99)
HCT VFR BLD AUTO: 32.5 % (ref 36–46)
HGB BLD-MCNC: 10.3 G/DL (ref 12–16)
HOLD SPECIMEN: NORMAL
IMM GRANULOCYTES # BLD AUTO: 0 X10*3/UL (ref 0–0.7)
IMM GRANULOCYTES NFR BLD AUTO: 0 % (ref 0–0.9)
LYMPHOCYTES # BLD AUTO: 1.17 X10*3/UL (ref 1.2–4.8)
LYMPHOCYTES NFR BLD AUTO: 35.3 %
MAGNESIUM SERPL-MCNC: 1.9 MG/DL (ref 1.6–2.4)
MCH RBC QN AUTO: 27.2 PG (ref 26–34)
MCHC RBC AUTO-ENTMCNC: 31.7 G/DL (ref 32–36)
MCV RBC AUTO: 86 FL (ref 80–100)
MONOCYTES # BLD AUTO: 0.24 X10*3/UL (ref 0.1–1)
MONOCYTES NFR BLD AUTO: 7.3 %
NEUTROPHILS # BLD AUTO: 1.81 X10*3/UL (ref 1.2–7.7)
NEUTROPHILS NFR BLD AUTO: 54.7 %
NRBC BLD-RTO: 0 /100 WBCS (ref 0–0)
PHOSPHATE SERPL-MCNC: 3.9 MG/DL (ref 2.5–4.9)
PLATELET # BLD AUTO: 204 X10*3/UL (ref 150–450)
POTASSIUM SERPL-SCNC: 3.6 MMOL/L (ref 3.5–5.3)
RBC # BLD AUTO: 3.78 X10*6/UL (ref 4–5.2)
SODIUM SERPL-SCNC: 139 MMOL/L (ref 136–145)
WBC # BLD AUTO: 3.3 X10*3/UL (ref 4.4–11.3)

## 2024-03-09 PROCEDURE — 2500000001 HC RX 250 WO HCPCS SELF ADMINISTERED DRUGS (ALT 637 FOR MEDICARE OP): Performed by: STUDENT IN AN ORGANIZED HEALTH CARE EDUCATION/TRAINING PROGRAM

## 2024-03-09 PROCEDURE — 2500000004 HC RX 250 GENERAL PHARMACY W/ HCPCS (ALT 636 FOR OP/ED): Performed by: STUDENT IN AN ORGANIZED HEALTH CARE EDUCATION/TRAINING PROGRAM

## 2024-03-09 PROCEDURE — 2500000002 HC RX 250 W HCPCS SELF ADMINISTERED DRUGS (ALT 637 FOR MEDICARE OP, ALT 636 FOR OP/ED): Performed by: STUDENT IN AN ORGANIZED HEALTH CARE EDUCATION/TRAINING PROGRAM

## 2024-03-09 PROCEDURE — 82040 ASSAY OF SERUM ALBUMIN: CPT

## 2024-03-09 PROCEDURE — G0378 HOSPITAL OBSERVATION PER HR: HCPCS

## 2024-03-09 PROCEDURE — 36415 COLL VENOUS BLD VENIPUNCTURE: CPT

## 2024-03-09 PROCEDURE — 99233 SBSQ HOSP IP/OBS HIGH 50: CPT | Performed by: STUDENT IN AN ORGANIZED HEALTH CARE EDUCATION/TRAINING PROGRAM

## 2024-03-09 PROCEDURE — 2500000005 HC RX 250 GENERAL PHARMACY W/O HCPCS

## 2024-03-09 PROCEDURE — 2500000004 HC RX 250 GENERAL PHARMACY W/ HCPCS (ALT 636 FOR OP/ED)

## 2024-03-09 PROCEDURE — 83735 ASSAY OF MAGNESIUM: CPT

## 2024-03-09 PROCEDURE — 85025 COMPLETE CBC W/AUTO DIFF WBC: CPT

## 2024-03-09 PROCEDURE — 2500000001 HC RX 250 WO HCPCS SELF ADMINISTERED DRUGS (ALT 637 FOR MEDICARE OP)

## 2024-03-09 PROCEDURE — 84100 ASSAY OF PHOSPHORUS: CPT

## 2024-03-09 PROCEDURE — 2500000002 HC RX 250 W HCPCS SELF ADMINISTERED DRUGS (ALT 637 FOR MEDICARE OP, ALT 636 FOR OP/ED)

## 2024-03-09 RX ORDER — LOPERAMIDE HYDROCHLORIDE 2 MG/1
2 CAPSULE ORAL 4 TIMES DAILY PRN
Status: DISCONTINUED | OUTPATIENT
Start: 2024-03-09 | End: 2024-03-17

## 2024-03-09 RX ORDER — POTASSIUM CHLORIDE 20 MEQ/1
40 TABLET, EXTENDED RELEASE ORAL ONCE
Status: COMPLETED | OUTPATIENT
Start: 2024-03-09 | End: 2024-03-09

## 2024-03-09 RX ORDER — METHOCARBAMOL 500 MG/1
500 TABLET, FILM COATED ORAL EVERY 8 HOURS PRN
Status: DISCONTINUED | OUTPATIENT
Start: 2024-03-09 | End: 2024-03-17

## 2024-03-09 RX ORDER — MAGNESIUM SULFATE HEPTAHYDRATE 40 MG/ML
2 INJECTION, SOLUTION INTRAVENOUS ONCE
Status: COMPLETED | OUTPATIENT
Start: 2024-03-09 | End: 2024-03-09

## 2024-03-09 RX ORDER — CALCIUM CARBONATE 200(500)MG
500 TABLET,CHEWABLE ORAL ONCE
Status: COMPLETED | OUTPATIENT
Start: 2024-03-09 | End: 2024-03-09

## 2024-03-09 RX ADMIN — FLUOXETINE 20 MG: 10 CAPSULE ORAL at 15:33

## 2024-03-09 RX ADMIN — RIOCIGUAT 2.5 MG: 2.5 TABLET, FILM COATED ORAL at 15:34

## 2024-03-09 RX ADMIN — MACITENTAN 10 MG: 10 TABLET, FILM COATED ORAL at 21:45

## 2024-03-09 RX ADMIN — SUCRALFATE 1 G: 1 TABLET ORAL at 18:56

## 2024-03-09 RX ADMIN — GABAPENTIN 600 MG: 300 CAPSULE ORAL at 19:58

## 2024-03-09 RX ADMIN — GABAPENTIN 300 MG: 300 CAPSULE ORAL at 09:52

## 2024-03-09 RX ADMIN — RIOCIGUAT 2.5 MG: 2.5 TABLET, FILM COATED ORAL at 06:35

## 2024-03-09 RX ADMIN — RIOCIGUAT 2.5 MG: 2.5 TABLET, FILM COATED ORAL at 09:49

## 2024-03-09 RX ADMIN — ACETAMINOPHEN 1000 MG: 10 INJECTION INTRAVENOUS at 03:25

## 2024-03-09 RX ADMIN — LACOSAMIDE 100 MG: 100 TABLET, FILM COATED ORAL at 19:59

## 2024-03-09 RX ADMIN — LACOSAMIDE 100 MG: 100 TABLET, FILM COATED ORAL at 09:52

## 2024-03-09 RX ADMIN — OXYCODONE HYDROCHLORIDE AND ACETAMINOPHEN 500 MG: 500 TABLET ORAL at 09:51

## 2024-03-09 RX ADMIN — PANTOPRAZOLE SODIUM 40 MG: 40 TABLET, DELAYED RELEASE ORAL at 06:36

## 2024-03-09 RX ADMIN — MAGNESIUM SULFATE HEPTAHYDRATE 2 G: 40 INJECTION, SOLUTION INTRAVENOUS at 09:53

## 2024-03-09 RX ADMIN — SODIUM CHLORIDE, POTASSIUM CHLORIDE, SODIUM LACTATE AND CALCIUM CHLORIDE 500 ML: 600; 310; 30; 20 INJECTION, SOLUTION INTRAVENOUS at 11:30

## 2024-03-09 RX ADMIN — AMITRIPTYLINE HYDROCHLORIDE 50 MG: 50 TABLET, FILM COATED ORAL at 20:00

## 2024-03-09 RX ADMIN — LAMOTRIGINE 100 MG: 100 TABLET ORAL at 09:53

## 2024-03-09 RX ADMIN — POTASSIUM CHLORIDE 40 MEQ: 1500 TABLET, EXTENDED RELEASE ORAL at 09:53

## 2024-03-09 RX ADMIN — SELEXIPAG 1400 MCG: 200 TABLET, COATED ORAL at 09:48

## 2024-03-09 RX ADMIN — SELEXIPAG 1400 MCG: 200 TABLET, COATED ORAL at 20:19

## 2024-03-09 RX ADMIN — ONDANSETRON HYDROCHLORIDE 4 MG: 4 TABLET, FILM COATED ORAL at 06:39

## 2024-03-09 RX ADMIN — ALLOPURINOL 100 MG: 100 TABLET ORAL at 09:50

## 2024-03-09 RX ADMIN — SUCRALFATE 1 G: 1 TABLET ORAL at 06:36

## 2024-03-09 RX ADMIN — ONDANSETRON HYDROCHLORIDE 4 MG: 4 TABLET, FILM COATED ORAL at 12:53

## 2024-03-09 RX ADMIN — METHOCARBAMOL 500 MG: 500 TABLET ORAL at 11:29

## 2024-03-09 RX ADMIN — RIOCIGUAT 2.5 MG: 2.5 TABLET, FILM COATED ORAL at 20:19

## 2024-03-09 RX ADMIN — CALCIUM CARBONATE (ANTACID) CHEW TAB 500 MG 500 MG: 500 CHEW TAB at 09:56

## 2024-03-09 RX ADMIN — RIVAROXABAN 10 MG: 20 TABLET, FILM COATED ORAL at 09:52

## 2024-03-09 ASSESSMENT — COGNITIVE AND FUNCTIONAL STATUS - GENERAL
DAILY ACTIVITIY SCORE: 24
MOBILITY SCORE: 24

## 2024-03-09 ASSESSMENT — PAIN SCALES - GENERAL
PAINLEVEL_OUTOF10: 8
PAINLEVEL_OUTOF10: 3

## 2024-03-09 ASSESSMENT — PAIN - FUNCTIONAL ASSESSMENT: PAIN_FUNCTIONAL_ASSESSMENT: 0-10

## 2024-03-09 NOTE — SIGNIFICANT EVENT
RADAR   03/09/24 1250   Onset Documentation   Rapid Response Initiated By Radar auto page   Pager Time 1250   Arrival Time 1300   Event End Time 1320   Primary Reason for Call Radar auto page     RADAR page auto generated from VS -see documented VS. Radar score 6. Upon arrival pt resting in bed. States she does not feel good. Just finished up solid food for lunch after being on clear liquids. VS recheck of BP and temp. Given ice water and fan for comfort. Will continue to monitor. Pt ok to remain on the floor for continued monitoring-bedside nurse updated and will call with any concerns.     Epic downtime- note written once system working.

## 2024-03-09 NOTE — PROGRESS NOTES
"Jazmin Hein is a 49 y.o. female on day 0 of admission admitted for Allergic reaction to drug, initial encounter.    Subjective     No acute events overnight.  This morning she reports her diarrhea is unchanged.  She is feeling a little better and open to trying advancing her diet.    Objective   BP 94/50   Pulse 84   Temp 36.8 °C (98.2 °F)   Resp 18   Ht 1.626 m (5' 4\")   Wt 107 kg (236 lb 8.9 oz)   SpO2 92%   BMI 40.60 kg/m²       Intake/Output last 3 Shifts:    Intake/Output Summary (Last 24 hours) at 3/9/2024 1834  Last data filed at 3/9/2024 1710  Gross per 24 hour   Intake 2050 ml   Output --   Net 2050 ml        Physical Exam  Physical Exam:  General appearance/Constitutional: No acute distress  Eyes: Sclera anicteric  Head & Neck: Moist mucous membranes  Respiratory/Chest Wall: Clear breath sounds  Cardiovascular: Regular rate and rhythm  Gastrointestinal: Soft, nontender, nondistended  Neurologic: Awake, alert, oriented to person place and time  Extremities: No lower extremity edema  Psychiatric: Appropriate mood  Lines/Drains: pIV    Scheduled medications  [Held by provider] cariprazine, 1.5 mg, oral, Daily  riociguat, 2.5 mg, oral, TID  macitentan, 10 mg, oral, Daily  selexipag, 1,400 mcg, oral, BID  allopurinol, 100 mg, oral, Daily  amitriptyline, 50 mg, oral, Nightly  ascorbic acid, 500 mg, oral, Daily  FLUoxetine, 20 mg, oral, Daily  gabapentin, 300 mg, oral, q AM  gabapentin, 600 mg, oral, Nightly  lacosamide, 100 mg, oral, BID  lamoTRIgine, 100 mg, oral, Daily  metoprolol tartrate, 50 mg, oral, BID  pantoprazole, 40 mg, oral, Daily before breakfast  rivaroxaban, 10 mg, oral, Daily  sucralfate, 1 g, oral, q6h SONIA      Continuous medications     PRN medications  PRN medications: albuterol, loperamide, loratadine, methocarbamol, ondansetron, oxygen, SUMAtriptan     Relevant Results    Lab Results   Component Value Date    WBC 3.3 (L) 03/09/2024    HGB 10.3 (L) 03/09/2024    HCT 32.5 (L) " 03/09/2024    MCV 86 03/09/2024     03/09/2024      Lab Results   Component Value Date    GLUCOSE 102 (H) 03/09/2024    CALCIUM 8.0 (L) 03/09/2024     03/09/2024    K 3.6 03/09/2024    CO2 26 03/09/2024     03/09/2024    BUN 11 03/09/2024    CREATININE 0.65 03/09/2024      Lab Results   Component Value Date    ALT 15 03/07/2024    AST 20 03/07/2024    ALKPHOS 101 03/07/2024    BILITOT 0.4 03/07/2024        Assessment and Plan by Problem:   Principal Problem:    Allergic reaction to drug, initial encounter      Jazmin Hein is a 49 y.o. female w/ PMHx PAH (Uptravi, Adempas, Opsimut), PPM 2019, SVT/PACs/PVCs on BB, HTN, TBI c/b seizure disorder, gastric ulcers, migraines, ELENA (on CPAP), recurrent PE on Xarelto, bipolar disorder, being admitted for headaches, vomiting, diarrhea, myalgias, jaw pain, and flushing in the setting of recently titrating up on Uptravi. Likely having orthostatic hypotension as a result of vomiting/diarrhea/poor PO intake and had 2 falls recently. She remains HDS, although BP softer, on her baseline O2 requirement of 4L. Plan to discuss with Dr. Johnson in the AM Uptravi dosing. She was given 500 ml in ED and will plan to hold home lasix for now in setting of volume depletion. Will ctm volume status and ability to tolerate PO.     Updates 3/9:  -Diarrhea persistent, low concern for infectious diarrhea, but will rule out with stool panel.  In meantime we will start loperamide as needed  - Blood pressure with SBP in 90s this AM which prompted holding metoprolol.  Will give 500 cc of LR over 5 hours and check orthostatic vital signs  - Advance diet to regular diet per patient preference  - Will start Robaxin for musculoskeletal pain  - Repleted potassium magnesium and calcium     #PAH  #RV failure  ::TTE (9/15/23): EF 65-70%, RV pressure, overload, impaired relaxation of LV diastolic filling, moderately elevated PAP, RVSP 62  ::RHC (10/31/2023) At rest PAp: 70/35 (47); PWP:  18; CO/CI 8.1/4.3. with exercise PAp: 90/40 (57); PWP: 20; CO/CI: 11.2/6.1   ::On 4L at baseline  -Home regimen: Adempas 2.5 mg TID, Opsumit 10 mg daily, Uptravi 1600 mcg bid - >Transitioned to 1400 micrograms per Dr. Johnson  -Hold Lasix 40 mg daily     #Diarrhea  #Hypokalemia  ::Likely 2/2 Uptravi  -Check stool pathogen panel  -Monitor electrolytes, replete PRN in setting of diarrhea     #Nausea, vomiting 2/2 Uptravi  -Qtc 486, spot dose Zofran     #Orthostatic hypotension  ::Likely volume depleted iso emesis, diarrhea  ::s/p 2x 500 ml IVF given in ED  -Obtain orthostats in AM  -Hold home lasix for now     #HTN  -Hold Losartan 25 mg daily iso softer BP     #Gout flare?   -Continue Allopurinol 100 mg daily  -Would avoid colchicine in setting of diarrhea and would avoid NSAIDs given known ulcers  -Obtain uric acid level. Consider starting steroid if elevated     #Migraines  #Seizures  #Bipolar disorder  -C/w home Amitriptyline 50 mg at bedtime, Prozac 20 mg daily  -C/w home Vraylar 1.5 mg -need to contact psych to continue  -C/w home Vimpat 100 mg bid  -C/w home Lamotrigine 100 mg daily  -Hold Rimegepant 75 mg every other day and Sumatriptan 100 mg bid prn given not currently having migraines  -Hold monthly Emgality 1 ml injections     #Hx SVTs/PACs/PVCs/NSVT  ::Event recorder outpatient revealing PVCs, NSVT, PACs  -Follows with cardiology outpatient, arranging for stress test as outpatient to rule out ischemic dz and referred to EP  -Tele  -Metop tartrate 50 mg BID, consider holding if softer Bps  -K>4, Mg>2     #Hx gastric ulcers  -Omeprazole 40 mg daily->panto 40 mg  -Sucralfate 1g q6h      #Recurrent PEs  ::unprovoked in 2014, recurrence after stopping AC  -C/w home Xarelto 10 mg daily     #Mild ELENA  -RT c/s for nightly CPAP     #Allergies: Loratidine 10 mg daily prn  #Neuropathic pain: C/w home Gabapentin 300 mg every morning, 600 every night  #Misc: c/w home vit C     F: cautious pHTN  E: replete PRN  N:  regular, 1.5L fluid restriction  A: PIV    DVT PPx: xarelto   GI PPx: PPI     CODE STATUS: DNR/DNI (confirmed on admission)  POA: Addi () [phone]     Lynda Whitman MD  Internal Medicine PGY3

## 2024-03-10 ENCOUNTER — APPOINTMENT (OUTPATIENT)
Dept: RADIOLOGY | Facility: HOSPITAL | Age: 49
DRG: 556 | End: 2024-03-10
Payer: COMMERCIAL

## 2024-03-10 LAB
ALBUMIN SERPL BCP-MCNC: 3.2 G/DL (ref 3.4–5)
ALBUMIN SERPL BCP-MCNC: 3.4 G/DL (ref 3.4–5)
ALP SERPL-CCNC: 90 U/L (ref 33–110)
ALT SERPL W P-5'-P-CCNC: 10 U/L (ref 7–45)
ANION GAP SERPL CALC-SCNC: 13 MMOL/L (ref 10–20)
AST SERPL W P-5'-P-CCNC: 12 U/L (ref 9–39)
BASOPHILS # BLD AUTO: 0.01 X10*3/UL (ref 0–0.1)
BASOPHILS NFR BLD AUTO: 0.3 %
BILIRUB DIRECT SERPL-MCNC: 0 MG/DL (ref 0–0.3)
BILIRUB SERPL-MCNC: 0.3 MG/DL (ref 0–1.2)
BUN SERPL-MCNC: 11 MG/DL (ref 6–23)
CALCIUM SERPL-MCNC: 8 MG/DL (ref 8.6–10.6)
CHLORIDE SERPL-SCNC: 107 MMOL/L (ref 98–107)
CO2 SERPL-SCNC: 25 MMOL/L (ref 21–32)
CREAT SERPL-MCNC: 0.64 MG/DL (ref 0.5–1.05)
EGFRCR SERPLBLD CKD-EPI 2021: >90 ML/MIN/1.73M*2
EOSINOPHIL # BLD AUTO: 0.12 X10*3/UL (ref 0–0.7)
EOSINOPHIL NFR BLD AUTO: 3.3 %
ERYTHROCYTE [DISTWIDTH] IN BLOOD BY AUTOMATED COUNT: 14.3 % (ref 11.5–14.5)
GLUCOSE SERPL-MCNC: 116 MG/DL (ref 74–99)
HCT VFR BLD AUTO: 30.7 % (ref 36–46)
HGB BLD-MCNC: 9.6 G/DL (ref 12–16)
IMM GRANULOCYTES # BLD AUTO: 0.01 X10*3/UL (ref 0–0.7)
IMM GRANULOCYTES NFR BLD AUTO: 0.3 % (ref 0–0.9)
LYMPHOCYTES # BLD AUTO: 1.15 X10*3/UL (ref 1.2–4.8)
LYMPHOCYTES NFR BLD AUTO: 31.9 %
MAGNESIUM SERPL-MCNC: 1.95 MG/DL (ref 1.6–2.4)
MCH RBC QN AUTO: 27.7 PG (ref 26–34)
MCHC RBC AUTO-ENTMCNC: 31.3 G/DL (ref 32–36)
MCV RBC AUTO: 89 FL (ref 80–100)
MONOCYTES # BLD AUTO: 0.23 X10*3/UL (ref 0.1–1)
MONOCYTES NFR BLD AUTO: 6.4 %
NEUTROPHILS # BLD AUTO: 2.09 X10*3/UL (ref 1.2–7.7)
NEUTROPHILS NFR BLD AUTO: 57.8 %
NRBC BLD-RTO: 0 /100 WBCS (ref 0–0)
PHOSPHATE SERPL-MCNC: 2.4 MG/DL (ref 2.5–4.9)
PLATELET # BLD AUTO: 203 X10*3/UL (ref 150–450)
POTASSIUM SERPL-SCNC: 4.1 MMOL/L (ref 3.5–5.3)
PROT SERPL-MCNC: 5.3 G/DL (ref 6.4–8.2)
RBC # BLD AUTO: 3.47 X10*6/UL (ref 4–5.2)
SODIUM SERPL-SCNC: 141 MMOL/L (ref 136–145)
WBC # BLD AUTO: 3.6 X10*3/UL (ref 4.4–11.3)

## 2024-03-10 PROCEDURE — 94762 N-INVAS EAR/PLS OXIMTRY CONT: CPT

## 2024-03-10 PROCEDURE — 71045 X-RAY EXAM CHEST 1 VIEW: CPT | Performed by: RADIOLOGY

## 2024-03-10 PROCEDURE — 94640 AIRWAY INHALATION TREATMENT: CPT

## 2024-03-10 PROCEDURE — 99233 SBSQ HOSP IP/OBS HIGH 50: CPT

## 2024-03-10 PROCEDURE — 2500000004 HC RX 250 GENERAL PHARMACY W/ HCPCS (ALT 636 FOR OP/ED)

## 2024-03-10 PROCEDURE — 2500000001 HC RX 250 WO HCPCS SELF ADMINISTERED DRUGS (ALT 637 FOR MEDICARE OP): Performed by: STUDENT IN AN ORGANIZED HEALTH CARE EDUCATION/TRAINING PROGRAM

## 2024-03-10 PROCEDURE — 2500000002 HC RX 250 W HCPCS SELF ADMINISTERED DRUGS (ALT 637 FOR MEDICARE OP, ALT 636 FOR OP/ED)

## 2024-03-10 PROCEDURE — G0378 HOSPITAL OBSERVATION PER HR: HCPCS

## 2024-03-10 PROCEDURE — 71045 X-RAY EXAM CHEST 1 VIEW: CPT

## 2024-03-10 PROCEDURE — 2500000002 HC RX 250 W HCPCS SELF ADMINISTERED DRUGS (ALT 637 FOR MEDICARE OP, ALT 636 FOR OP/ED): Performed by: STUDENT IN AN ORGANIZED HEALTH CARE EDUCATION/TRAINING PROGRAM

## 2024-03-10 PROCEDURE — 80069 RENAL FUNCTION PANEL: CPT | Performed by: STUDENT IN AN ORGANIZED HEALTH CARE EDUCATION/TRAINING PROGRAM

## 2024-03-10 PROCEDURE — 83735 ASSAY OF MAGNESIUM: CPT | Performed by: STUDENT IN AN ORGANIZED HEALTH CARE EDUCATION/TRAINING PROGRAM

## 2024-03-10 PROCEDURE — 2500000001 HC RX 250 WO HCPCS SELF ADMINISTERED DRUGS (ALT 637 FOR MEDICARE OP)

## 2024-03-10 PROCEDURE — 85025 COMPLETE CBC W/AUTO DIFF WBC: CPT | Performed by: STUDENT IN AN ORGANIZED HEALTH CARE EDUCATION/TRAINING PROGRAM

## 2024-03-10 PROCEDURE — 36415 COLL VENOUS BLD VENIPUNCTURE: CPT | Performed by: STUDENT IN AN ORGANIZED HEALTH CARE EDUCATION/TRAINING PROGRAM

## 2024-03-10 RX ORDER — IPRATROPIUM BROMIDE AND ALBUTEROL SULFATE 2.5; .5 MG/3ML; MG/3ML
3 SOLUTION RESPIRATORY (INHALATION)
Status: DISCONTINUED | OUTPATIENT
Start: 2024-03-10 | End: 2024-03-13

## 2024-03-10 RX ORDER — ACETAMINOPHEN 10 MG/ML
1000 INJECTION, SOLUTION INTRAVENOUS EVERY 12 HOURS
Status: DISCONTINUED | OUTPATIENT
Start: 2024-03-10 | End: 2024-03-10

## 2024-03-10 RX ORDER — ACETAMINOPHEN 325 MG/1
975 TABLET ORAL 3 TIMES DAILY
Status: DISCONTINUED | OUTPATIENT
Start: 2024-03-10 | End: 2024-03-22 | Stop reason: HOSPADM

## 2024-03-10 RX ADMIN — LACOSAMIDE 100 MG: 100 TABLET, FILM COATED ORAL at 09:47

## 2024-03-10 RX ADMIN — MACITENTAN 10 MG: 10 TABLET, FILM COATED ORAL at 22:00

## 2024-03-10 RX ADMIN — METOPROLOL TARTRATE 50 MG: 50 TABLET, FILM COATED ORAL at 22:21

## 2024-03-10 RX ADMIN — RIOCIGUAT 2.5 MG: 2.5 TABLET, FILM COATED ORAL at 09:50

## 2024-03-10 RX ADMIN — LAMOTRIGINE 100 MG: 100 TABLET ORAL at 09:47

## 2024-03-10 RX ADMIN — ALLOPURINOL 100 MG: 100 TABLET ORAL at 09:47

## 2024-03-10 RX ADMIN — AMITRIPTYLINE HYDROCHLORIDE 50 MG: 50 TABLET, FILM COATED ORAL at 22:21

## 2024-03-10 RX ADMIN — OXYCODONE HYDROCHLORIDE AND ACETAMINOPHEN 500 MG: 500 TABLET ORAL at 09:49

## 2024-03-10 RX ADMIN — FLUOXETINE 20 MG: 10 CAPSULE ORAL at 09:47

## 2024-03-10 RX ADMIN — IPRATROPIUM BROMIDE AND ALBUTEROL SULFATE 3 ML: .5; 3 SOLUTION RESPIRATORY (INHALATION) at 13:44

## 2024-03-10 RX ADMIN — LACOSAMIDE 100 MG: 100 TABLET, FILM COATED ORAL at 22:20

## 2024-03-10 RX ADMIN — RIVAROXABAN 10 MG: 20 TABLET, FILM COATED ORAL at 09:47

## 2024-03-10 RX ADMIN — GABAPENTIN 300 MG: 300 CAPSULE ORAL at 09:47

## 2024-03-10 RX ADMIN — SELEXIPAG 1400 MCG: 200 TABLET, COATED ORAL at 09:50

## 2024-03-10 RX ADMIN — SUCRALFATE 1 G: 1 TABLET ORAL at 06:48

## 2024-03-10 RX ADMIN — SELEXIPAG 1400 MCG: 200 TABLET, COATED ORAL at 21:00

## 2024-03-10 RX ADMIN — SUCRALFATE 1 G: 1 TABLET ORAL at 17:11

## 2024-03-10 RX ADMIN — RIOCIGUAT 2.5 MG: 2.5 TABLET, FILM COATED ORAL at 22:00

## 2024-03-10 RX ADMIN — PANTOPRAZOLE SODIUM 40 MG: 40 TABLET, DELAYED RELEASE ORAL at 06:48

## 2024-03-10 RX ADMIN — RIOCIGUAT 2.5 MG: 2.5 TABLET, FILM COATED ORAL at 15:38

## 2024-03-10 RX ADMIN — IPRATROPIUM BROMIDE AND ALBUTEROL SULFATE 3 ML: .5; 3 SOLUTION RESPIRATORY (INHALATION) at 19:44

## 2024-03-10 RX ADMIN — SODIUM CHLORIDE, POTASSIUM CHLORIDE, SODIUM LACTATE AND CALCIUM CHLORIDE 500 ML: 600; 310; 30; 20 INJECTION, SOLUTION INTRAVENOUS at 01:13

## 2024-03-10 RX ADMIN — ACETAMINOPHEN 975 MG: 325 TABLET ORAL at 22:20

## 2024-03-10 RX ADMIN — ACETAMINOPHEN 975 MG: 325 TABLET ORAL at 09:47

## 2024-03-10 RX ADMIN — ACETAMINOPHEN 975 MG: 325 TABLET ORAL at 15:37

## 2024-03-10 RX ADMIN — SUCRALFATE 1 G: 1 TABLET ORAL at 11:59

## 2024-03-10 RX ADMIN — GABAPENTIN 600 MG: 300 CAPSULE ORAL at 22:22

## 2024-03-10 RX ADMIN — SUCRALFATE 1 G: 1 TABLET ORAL at 01:50

## 2024-03-10 ASSESSMENT — PAIN SCALES - PAIN ASSESSMENT IN ADVANCED DEMENTIA (PAINAD)
BODYLANGUAGE: RELAXED
TOTALSCORE: 0
CONSOLABILITY: NO NEED TO CONSOLE
BREATHING: NORMAL
FACIALEXPRESSION: SMILING OR INEXPRESSIVE

## 2024-03-10 ASSESSMENT — COGNITIVE AND FUNCTIONAL STATUS - GENERAL
DAILY ACTIVITIY SCORE: 24
CLIMB 3 TO 5 STEPS WITH RAILING: A LITTLE
MOBILITY SCORE: 23

## 2024-03-10 ASSESSMENT — PAIN SCALES - GENERAL
PAINLEVEL_OUTOF10: 6
PAINLEVEL_OUTOF10: 0 - NO PAIN

## 2024-03-10 ASSESSMENT — PAIN - FUNCTIONAL ASSESSMENT: PAIN_FUNCTIONAL_ASSESSMENT: 0-10

## 2024-03-10 NOTE — SIGNIFICANT EVENT
Patient's vital signs earlier in the night were HR: 85, BP: 88/54, SpO2 95%on 4 L RA, RR 20. Metoprolol was held overnight.     Patient states that she was feeling tired and is feeling more tired than usual. She knows her blood pressure is low and states that it usually isn't like that. She endorses dizziness/lightheadedness when standing up. She states she has been eating and drinking today. Denies chest pain. Endorses shortness of breath (thought not different from baseline), denies dysuria, denies chills. Endorsed feeling hot and sweaty.     Focused exam:  Repeat vital signs: T 98.2, 92% on RA. HR: 85, BP: 94/50  Cardiac: Regular rate and rhythm, no murmurs, rubs, or gallops  PULM: CTA bilaterally     Orthostatic vital signs were obtained:  85/58, pulse of 83  84/58, pulse of 82  74/43, pulse of 80. Per report, patient had dizziness when standing and swaying back and forth.     Ordered 500 ml bolus of LR. Repeat blood pressure has since improved to 103/58.

## 2024-03-10 NOTE — PROGRESS NOTES
"Jazmin Hein is a 49 y.o. female on day 0 of admission admitted for Allergic reaction to drug, initial encounter.    Subjective     No acute events overnight.  This morning she reports her diarrhea is unchanged.  She is feeling a little better and tolerating liquids PO.    Objective   /57   Pulse 73   Temp 36.7 °C (98.1 °F)   Resp 18   Ht 1.626 m (5' 4\")   Wt 107 kg (236 lb 8.9 oz)   SpO2 95%   BMI 40.60 kg/m²       Intake/Output last 3 Shifts:    Intake/Output Summary (Last 24 hours) at 3/10/2024 1221  Last data filed at 3/10/2024 1008  Gross per 24 hour   Intake 1900 ml   Output --   Net 1900 ml        Physical Exam  Physical Exam:  General appearance/Constitutional: No acute distress  Eyes: Sclera anicteric  Head & Neck: Moist mucous membranes  Respiratory/Chest Wall: Clear breath sounds  Cardiovascular: Regular rate and rhythm  Gastrointestinal: Soft, nontender, nondistended  Neurologic: Awake, alert, oriented to person place and time  Extremities: No lower extremity edema  Psychiatric: Appropriate mood  Lines/Drains: pIV    Scheduled medications  [Held by provider] cariprazine, 1.5 mg, oral, Daily  riociguat, 2.5 mg, oral, TID  macitentan, 10 mg, oral, Daily  selexipag, 1,400 mcg, oral, BID  acetaminophen, 975 mg, oral, TID  allopurinol, 100 mg, oral, Daily  amitriptyline, 50 mg, oral, Nightly  ascorbic acid, 500 mg, oral, Daily  FLUoxetine, 20 mg, oral, Daily  gabapentin, 300 mg, oral, q AM  gabapentin, 600 mg, oral, Nightly  lacosamide, 100 mg, oral, BID  lamoTRIgine, 100 mg, oral, Daily  metoprolol tartrate, 50 mg, oral, BID  pantoprazole, 40 mg, oral, Daily before breakfast  rivaroxaban, 10 mg, oral, Daily  sucralfate, 1 g, oral, q6h SONIA      Continuous medications     PRN medications  PRN medications: albuterol, loperamide, loratadine, methocarbamol, ondansetron, oxygen, SUMAtriptan     Relevant Results    Lab Results   Component Value Date    WBC 3.6 (L) 03/10/2024    HGB 9.6 (L) " 03/10/2024    HCT 30.7 (L) 03/10/2024    MCV 89 03/10/2024     03/10/2024      Lab Results   Component Value Date    GLUCOSE 116 (H) 03/10/2024    CALCIUM 8.0 (L) 03/10/2024     03/10/2024    K 4.1 03/10/2024    CO2 25 03/10/2024     03/10/2024    BUN 11 03/10/2024    CREATININE 0.64 03/10/2024      Lab Results   Component Value Date    ALT 10 03/09/2024    AST 12 03/09/2024    ALKPHOS 90 03/09/2024    BILITOT 0.3 03/09/2024        Assessment and Plan by Problem:   Principal Problem:    Allergic reaction to drug, initial encounter    Jazmin Hein is a 49 y.o. female w/ PMHx PAH (Uptravi, Adempas, Opsimut), PPM 2019, SVT/PACs/PVCs on BB, HTN, TBI c/b seizure disorder, gastric ulcers, migraines, ELENA (on CPAP), recurrent PE on Xarelto, bipolar disorder, being admitted for headaches, vomiting, diarrhea, myalgias, jaw pain, and flushing in the setting of recently titrating up on Uptravi. Likely having orthostatic hypotension as a result of vomiting/diarrhea/poor PO intake and had 2 falls recently. She remains HDS, although BP softer, on her baseline O2 requirement of 4L. Plan to discuss with Dr. Johnson in the AM Uptravi dosing. She was given 500 ml in ED and will plan to hold home lasix for now in setting of volume depletion. Will ctm volume status and ability to tolerate PO.     Updates 3/10:  -Diarrhea persistent, low concern for infectious diarrhea, but will rule out with stool panel (Pending). Loperamide PRN  - Blood pressure with SBP in 90s this AM which prompted holding metoprolol.  Will give 500 cc of LR over 5 hours and check orthostatic vital signs. Recheck SBP 120s. Pending orthostatic vital signs  - Advance diet to regular diet per patient preference       #PAH  #RV failure  ::TTE (9/15/23): EF 65-70%, RV pressure, overload, impaired relaxation of LV diastolic filling, moderately elevated PAP, RVSP 62  ::RHC (10/31/2023) At rest PAp: 70/35 (47); PWP: 18; CO/CI 8.1/4.3. with exercise PAp:  90/40 (57); PWP: 20; CO/CI: 11.2/6.1   ::On 4L at baseline  -Home regimen: Adempas 2.5 mg TID, Opsumit 10 mg daily, Uptravi 1600 mcg bid - >Transitioned to 1400 micrograms per Dr. Johnson  -Hold Lasix 40 mg daily     #Diarrhea  #Hypokalemia  ::Likely 2/2 Uptravi  -Check stool pathogen panel  -Monitor electrolytes, replete PRN in setting of diarrhea     #Nausea, vomiting 2/2 Uptravi  -Qtc 486, spot dose Zofran     #Orthostatic hypotension  ::Likely volume depleted iso emesis, diarrhea  ::s/p 2x 500 ml IVF given in ED  -Obtain orthostats in AM  -Hold home lasix for now     #HTN  -Hold Losartan 25 mg daily iso softer BP     #Gout flare?   -Continue Allopurinol 100 mg daily  -Would avoid colchicine in setting of diarrhea and would avoid NSAIDs given known ulcers  -Obtain uric acid level. Consider starting steroid if elevated     #Migraines  #Seizures  #Bipolar disorder  -C/w home Amitriptyline 50 mg at bedtime, Prozac 20 mg daily  -C/w home Vraylar 1.5 mg -need to contact psych to continue  -C/w home Vimpat 100 mg bid  -C/w home Lamotrigine 100 mg daily  -Hold Rimegepant 75 mg every other day and Sumatriptan 100 mg bid prn given not currently having migraines  -Hold monthly Emgality 1 ml injections     #Hx SVTs/PACs/PVCs/NSVT  ::Event recorder outpatient revealing PVCs, NSVT, PACs  -Follows with cardiology outpatient, arranging for stress test as outpatient to rule out ischemic dz and referred to EP  -Tele  -Metop tartrate 50 mg BID, consider holding if softer Bps  -K>4, Mg>2     #Hx gastric ulcers  -Omeprazole 40 mg daily->panto 40 mg  -Sucralfate 1g q6h      #Recurrent PEs  ::unprovoked in 2014, recurrence after stopping AC  -C/w home Xarelto 10 mg daily     #Mild ELENA  -RT c/s for nightly CPAP     #Allergies: Loratidine 10 mg daily prn  #Neuropathic pain: C/w home Gabapentin 300 mg every morning, 600 every night  #Misc: c/w home vit C     F: cautious pHTN  E: replete PRN  N: regular, 1.5L fluid restriction  A:  PIV    DVT PPx: xarelto   GI PPx: PPI     CODE STATUS: DNR/DNI (confirmed on admission)  POA: Addi () [phone]     Reinaldo Cespedes MD PhD  PGY1 Medicine

## 2024-03-10 NOTE — SIGNIFICANT EVENT
03/10/24 1305   Onset Documentation   Rapid Response Initiated By Radar auto page   Location/Room [facility]  (LK [id number]-B)   Pager Time [phone]   Arrival Time 1305   Event End Time 1325   Level II Called No   Primary Reason for Call Radar auto page     Rapid Response Note    Radar auto-page received for a radar score of 6 with the following vital signs: 36.9, 81, 16, 106/52, 91% on 4 liters NC.  Examined patient at the bedside.  Breath sounds clear to diminished at the bases.  Increased O2 to 6 liters which improved saturations to 93%.  Conferred with [facility] resident Dr. Najera.  Will order CXR to evaluate for fluid overload.  Albuterol nebulizers ordered [id number] (home medication but not ordered here).  Plan reviewed with resident and primary RN.  RN to contact Rapid Response with any future concerns or signs of clinical decompensation.

## 2024-03-10 NOTE — CARE PLAN
The patient's goals for the shift include    Problem: Pain - Adult  Goal: Verbalizes/displays adequate comfort level or baseline comfort level  Outcome: Progressing     Problem: Safety - Adult  Goal: Free from fall injury  Outcome: Progressing     Problem: Discharge Planning  Goal: Discharge to home or other facility with appropriate resources  Outcome: Progressing     Problem: Chronic Conditions and Co-morbidities  Goal: Patient's chronic conditions and co-morbidity symptoms are monitored and maintained or improved  Outcome: Progressing       The clinical goals for the shift include report decrease shortness of breath, SpO2% >92, SBP >90.

## 2024-03-11 LAB
ALBUMIN SERPL BCP-MCNC: 3.3 G/DL (ref 3.4–5)
ALBUMIN SERPL BCP-MCNC: 3.6 G/DL (ref 3.4–5)
ANION GAP SERPL CALC-SCNC: 13 MMOL/L (ref 10–20)
ANION GAP SERPL CALC-SCNC: 15 MMOL/L (ref 10–20)
BASOPHILS # BLD AUTO: 0.01 X10*3/UL (ref 0–0.1)
BASOPHILS NFR BLD AUTO: 0.2 %
BNP SERPL-MCNC: 454 PG/ML (ref 0–99)
BUN SERPL-MCNC: 10 MG/DL (ref 6–23)
BUN SERPL-MCNC: 11 MG/DL (ref 6–23)
CALCIUM SERPL-MCNC: 8.4 MG/DL (ref 8.6–10.6)
CALCIUM SERPL-MCNC: 8.6 MG/DL (ref 8.6–10.6)
CHLORIDE SERPL-SCNC: 103 MMOL/L (ref 98–107)
CHLORIDE SERPL-SCNC: 107 MMOL/L (ref 98–107)
CO2 SERPL-SCNC: 25 MMOL/L (ref 21–32)
CO2 SERPL-SCNC: 26 MMOL/L (ref 21–32)
CREAT SERPL-MCNC: 0.69 MG/DL (ref 0.5–1.05)
CREAT SERPL-MCNC: 0.76 MG/DL (ref 0.5–1.05)
EGFRCR SERPLBLD CKD-EPI 2021: >90 ML/MIN/1.73M*2
EGFRCR SERPLBLD CKD-EPI 2021: >90 ML/MIN/1.73M*2
EOSINOPHIL # BLD AUTO: 0.12 X10*3/UL (ref 0–0.7)
EOSINOPHIL NFR BLD AUTO: 2.8 %
ERYTHROCYTE [DISTWIDTH] IN BLOOD BY AUTOMATED COUNT: 14.5 % (ref 11.5–14.5)
GLUCOSE SERPL-MCNC: 123 MG/DL (ref 74–99)
GLUCOSE SERPL-MCNC: 87 MG/DL (ref 74–99)
HCT VFR BLD AUTO: 28.3 % (ref 36–46)
HGB BLD-MCNC: 9.4 G/DL (ref 12–16)
IMM GRANULOCYTES # BLD AUTO: 0.01 X10*3/UL (ref 0–0.7)
IMM GRANULOCYTES NFR BLD AUTO: 0.2 % (ref 0–0.9)
LYMPHOCYTES # BLD AUTO: 0.93 X10*3/UL (ref 1.2–4.8)
LYMPHOCYTES NFR BLD AUTO: 21.6 %
MAGNESIUM SERPL-MCNC: 1.81 MG/DL (ref 1.6–2.4)
MAGNESIUM SERPL-MCNC: 1.92 MG/DL (ref 1.6–2.4)
MCH RBC QN AUTO: 27 PG (ref 26–34)
MCHC RBC AUTO-ENTMCNC: 33.2 G/DL (ref 32–36)
MCV RBC AUTO: 81 FL (ref 80–100)
MONOCYTES # BLD AUTO: 0.34 X10*3/UL (ref 0.1–1)
MONOCYTES NFR BLD AUTO: 7.9 %
NEUTROPHILS # BLD AUTO: 2.89 X10*3/UL (ref 1.2–7.7)
NEUTROPHILS NFR BLD AUTO: 67.3 %
NRBC BLD-RTO: 0 /100 WBCS (ref 0–0)
PHOSPHATE SERPL-MCNC: 3.3 MG/DL (ref 2.5–4.9)
PHOSPHATE SERPL-MCNC: 3.5 MG/DL (ref 2.5–4.9)
PLATELET # BLD AUTO: 198 X10*3/UL (ref 150–450)
POTASSIUM SERPL-SCNC: 3.5 MMOL/L (ref 3.5–5.3)
POTASSIUM SERPL-SCNC: 4.3 MMOL/L (ref 3.5–5.3)
RBC # BLD AUTO: 3.48 X10*6/UL (ref 4–5.2)
SODIUM SERPL-SCNC: 139 MMOL/L (ref 136–145)
SODIUM SERPL-SCNC: 142 MMOL/L (ref 136–145)
WBC # BLD AUTO: 4.3 X10*3/UL (ref 4.4–11.3)

## 2024-03-11 PROCEDURE — 83735 ASSAY OF MAGNESIUM: CPT

## 2024-03-11 PROCEDURE — 2500000002 HC RX 250 W HCPCS SELF ADMINISTERED DRUGS (ALT 637 FOR MEDICARE OP, ALT 636 FOR OP/ED): Performed by: STUDENT IN AN ORGANIZED HEALTH CARE EDUCATION/TRAINING PROGRAM

## 2024-03-11 PROCEDURE — 1200000002 HC GENERAL ROOM WITH TELEMETRY DAILY

## 2024-03-11 PROCEDURE — 2500000004 HC RX 250 GENERAL PHARMACY W/ HCPCS (ALT 636 FOR OP/ED)

## 2024-03-11 PROCEDURE — 83880 ASSAY OF NATRIURETIC PEPTIDE: CPT

## 2024-03-11 PROCEDURE — 94640 AIRWAY INHALATION TREATMENT: CPT

## 2024-03-11 PROCEDURE — 2500000002 HC RX 250 W HCPCS SELF ADMINISTERED DRUGS (ALT 637 FOR MEDICARE OP, ALT 636 FOR OP/ED)

## 2024-03-11 PROCEDURE — 2500000001 HC RX 250 WO HCPCS SELF ADMINISTERED DRUGS (ALT 637 FOR MEDICARE OP)

## 2024-03-11 PROCEDURE — 85025 COMPLETE CBC W/AUTO DIFF WBC: CPT

## 2024-03-11 PROCEDURE — 2500000001 HC RX 250 WO HCPCS SELF ADMINISTERED DRUGS (ALT 637 FOR MEDICARE OP): Performed by: STUDENT IN AN ORGANIZED HEALTH CARE EDUCATION/TRAINING PROGRAM

## 2024-03-11 PROCEDURE — 36415 COLL VENOUS BLD VENIPUNCTURE: CPT

## 2024-03-11 PROCEDURE — 94762 N-INVAS EAR/PLS OXIMTRY CONT: CPT

## 2024-03-11 PROCEDURE — 99232 SBSQ HOSP IP/OBS MODERATE 35: CPT

## 2024-03-11 PROCEDURE — 80069 RENAL FUNCTION PANEL: CPT

## 2024-03-11 RX ORDER — POTASSIUM CHLORIDE 14.9 MG/ML
20 INJECTION INTRAVENOUS ONCE
Status: COMPLETED | OUTPATIENT
Start: 2024-03-11 | End: 2024-03-12

## 2024-03-11 RX ORDER — FUROSEMIDE 10 MG/ML
40 INJECTION INTRAMUSCULAR; INTRAVENOUS ONCE
Status: COMPLETED | OUTPATIENT
Start: 2024-03-11 | End: 2024-03-11

## 2024-03-11 RX ORDER — POTASSIUM CHLORIDE 20 MEQ/1
40 TABLET, EXTENDED RELEASE ORAL ONCE
Status: COMPLETED | OUTPATIENT
Start: 2024-03-11 | End: 2024-03-11

## 2024-03-11 RX ORDER — LANOLIN ALCOHOL/MO/W.PET/CERES
400 CREAM (GRAM) TOPICAL DAILY
Status: DISCONTINUED | OUTPATIENT
Start: 2024-03-11 | End: 2024-03-22 | Stop reason: HOSPADM

## 2024-03-11 RX ORDER — FUROSEMIDE 40 MG/1
40 TABLET ORAL DAILY
Status: DISCONTINUED | OUTPATIENT
Start: 2024-03-12 | End: 2024-03-12

## 2024-03-11 RX ADMIN — POTASSIUM CHLORIDE 40 MEQ: 1500 TABLET, EXTENDED RELEASE ORAL at 22:39

## 2024-03-11 RX ADMIN — MACITENTAN 10 MG: 10 TABLET, FILM COATED ORAL at 15:38

## 2024-03-11 RX ADMIN — ACETAMINOPHEN 975 MG: 325 TABLET ORAL at 09:22

## 2024-03-11 RX ADMIN — RIOCIGUAT 2.5 MG: 2.5 TABLET, FILM COATED ORAL at 05:50

## 2024-03-11 RX ADMIN — SELEXIPAG 1400 MCG: 200 TABLET, COATED ORAL at 20:40

## 2024-03-11 RX ADMIN — PANTOPRAZOLE SODIUM 40 MG: 40 TABLET, DELAYED RELEASE ORAL at 05:49

## 2024-03-11 RX ADMIN — Medication 400 MG: at 22:39

## 2024-03-11 RX ADMIN — SUCRALFATE 1 G: 1 TABLET ORAL at 05:48

## 2024-03-11 RX ADMIN — IPRATROPIUM BROMIDE AND ALBUTEROL SULFATE 3 ML: .5; 3 SOLUTION RESPIRATORY (INHALATION) at 08:15

## 2024-03-11 RX ADMIN — METOPROLOL TARTRATE 50 MG: 50 TABLET, FILM COATED ORAL at 20:38

## 2024-03-11 RX ADMIN — AMITRIPTYLINE HYDROCHLORIDE 50 MG: 50 TABLET, FILM COATED ORAL at 20:38

## 2024-03-11 RX ADMIN — GABAPENTIN 600 MG: 300 CAPSULE ORAL at 20:38

## 2024-03-11 RX ADMIN — POTASSIUM CHLORIDE 20 MEQ: 14.9 INJECTION, SOLUTION INTRAVENOUS at 22:39

## 2024-03-11 RX ADMIN — FUROSEMIDE 40 MG: 10 INJECTION, SOLUTION INTRAMUSCULAR; INTRAVENOUS at 10:32

## 2024-03-11 RX ADMIN — LACOSAMIDE 100 MG: 100 TABLET, FILM COATED ORAL at 09:22

## 2024-03-11 RX ADMIN — SUCRALFATE 1 G: 1 TABLET ORAL at 12:41

## 2024-03-11 RX ADMIN — RIOCIGUAT 2.5 MG: 2.5 TABLET, FILM COATED ORAL at 20:40

## 2024-03-11 RX ADMIN — ALLOPURINOL 100 MG: 100 TABLET ORAL at 09:22

## 2024-03-11 RX ADMIN — SUCRALFATE 1 G: 1 TABLET ORAL at 17:36

## 2024-03-11 RX ADMIN — LAMOTRIGINE 100 MG: 100 TABLET ORAL at 09:24

## 2024-03-11 RX ADMIN — GABAPENTIN 300 MG: 300 CAPSULE ORAL at 09:22

## 2024-03-11 RX ADMIN — LACOSAMIDE 100 MG: 100 TABLET, FILM COATED ORAL at 20:37

## 2024-03-11 RX ADMIN — FUROSEMIDE 40 MG: 10 INJECTION, SOLUTION INTRAMUSCULAR; INTRAVENOUS at 17:36

## 2024-03-11 RX ADMIN — RIOCIGUAT 2.5 MG: 2.5 TABLET, FILM COATED ORAL at 15:39

## 2024-03-11 RX ADMIN — IPRATROPIUM BROMIDE AND ALBUTEROL SULFATE 3 ML: .5; 3 SOLUTION RESPIRATORY (INHALATION) at 21:16

## 2024-03-11 RX ADMIN — ACETAMINOPHEN 975 MG: 325 TABLET ORAL at 15:26

## 2024-03-11 RX ADMIN — IPRATROPIUM BROMIDE AND ALBUTEROL SULFATE 3 ML: .5; 3 SOLUTION RESPIRATORY (INHALATION) at 14:03

## 2024-03-11 RX ADMIN — RIVAROXABAN 10 MG: 20 TABLET, FILM COATED ORAL at 09:22

## 2024-03-11 RX ADMIN — SUMATRIPTAN SUCCINATE 100 MG: 100 TABLET ORAL at 15:26

## 2024-03-11 RX ADMIN — SELEXIPAG 1400 MCG: 200 TABLET, COATED ORAL at 09:22

## 2024-03-11 RX ADMIN — ACETAMINOPHEN 975 MG: 325 TABLET ORAL at 20:38

## 2024-03-11 RX ADMIN — OXYCODONE HYDROCHLORIDE AND ACETAMINOPHEN 500 MG: 500 TABLET ORAL at 09:22

## 2024-03-11 RX ADMIN — SUCRALFATE 1 G: 1 TABLET ORAL at 00:00

## 2024-03-11 RX ADMIN — FLUOXETINE 20 MG: 10 CAPSULE ORAL at 09:22

## 2024-03-11 ASSESSMENT — COGNITIVE AND FUNCTIONAL STATUS - GENERAL
MOBILITY SCORE: 23
DAILY ACTIVITIY SCORE: 24
CLIMB 3 TO 5 STEPS WITH RAILING: A LITTLE

## 2024-03-11 ASSESSMENT — PAIN SCALES - GENERAL
PAINLEVEL_OUTOF10: 5 - MODERATE PAIN
PAINLEVEL_OUTOF10: 0 - NO PAIN

## 2024-03-11 ASSESSMENT — PAIN SCALES - WONG BAKER: WONGBAKER_NUMERICALRESPONSE: NO HURT

## 2024-03-11 NOTE — CARE PLAN
The patient's goals for the shift include      The clinical goals for the shift include patient will be able to tolerate O2 wean to 4L during this shift.      Problem: Discharge Planning  Goal: Discharge to home or other facility with appropriate resources  Outcome: Progressing     Problem: Safety - Adult  Goal: Free from fall injury  Outcome: Progressing     Problem: Pain - Adult  Goal: Verbalizes/displays adequate comfort level or baseline comfort level  Outcome: Progressing

## 2024-03-11 NOTE — PROGRESS NOTES
"  Subjective     No acute events overnight.  This morning she reports her diarrhea is improved. Endorses sOB w/ ambulation that has start prior to admission.    Objective   BP 98/54   Pulse 80   Temp 36.6 °C (97.9 °F)   Resp 16   Ht 1.626 m (5' 4\")   Wt 107 kg (236 lb 8.9 oz)   SpO2 91%   BMI 40.60 kg/m²       Intake/Output last 3 Shifts:    Intake/Output Summary (Last 24 hours) at 3/11/2024 1325  Last data filed at 3/11/2024 1028  Gross per 24 hour   Intake 240 ml   Output --   Net 240 ml        Physical Exam  Physical Exam:  General appearance/Constitutional: No acute distress  Eyes: Sclera anicteric  Head & Neck: Moist mucous membranes  Respiratory/Chest Wall: Clear breath sounds  Cardiovascular: Regular rate and rhythm  Gastrointestinal: Soft, nontender, nondistended  Neurologic: Awake, alert, oriented to person place and time  Extremities: No lower extremity edema  Psychiatric: Appropriate mood  Lines/Drains: pIV    Scheduled medications  [Held by provider] cariprazine, 1.5 mg, oral, Daily  riociguat, 2.5 mg, oral, TID  macitentan, 10 mg, oral, Daily  selexipag, 1,400 mcg, oral, BID  acetaminophen, 975 mg, oral, TID  allopurinol, 100 mg, oral, Daily  amitriptyline, 50 mg, oral, Nightly  ascorbic acid, 500 mg, oral, Daily  FLUoxetine, 20 mg, oral, Daily  [START ON 3/12/2024] furosemide, 40 mg, oral, Daily  gabapentin, 300 mg, oral, q AM  gabapentin, 600 mg, oral, Nightly  ipratropium-albuteroL, 3 mL, nebulization, q6h  lacosamide, 100 mg, oral, BID  lamoTRIgine, 100 mg, oral, Daily  metoprolol tartrate, 50 mg, oral, BID  pantoprazole, 40 mg, oral, Daily before breakfast  rivaroxaban, 10 mg, oral, Daily  sucralfate, 1 g, oral, q6h SONIA      Continuous medications     PRN medications  PRN medications: loperamide, loratadine, methocarbamol, ondansetron, oxygen, SUMAtriptan     Relevant Results    Lab Results   Component Value Date    WBC 4.3 (L) 03/11/2024    HGB 9.4 (L) 03/11/2024    HCT 28.3 (L) 03/11/2024 "    MCV 81 03/11/2024     03/11/2024      Lab Results   Component Value Date    GLUCOSE 87 03/11/2024    CALCIUM 8.4 (L) 03/11/2024     03/11/2024    K 4.3 03/11/2024    CO2 26 03/11/2024     03/11/2024    BUN 11 03/11/2024    CREATININE 0.69 03/11/2024      Lab Results   Component Value Date    ALT 10 03/09/2024    AST 12 03/09/2024    ALKPHOS 90 03/09/2024    BILITOT 0.3 03/09/2024        Assessment and Plan by Problem:   Principal Problem:    Allergic reaction to drug, initial encounter    Jazmin Hein is a 49 y.o. female w/ PMHx PAH (Uptravi, Adempas, Opsimut), PPM 2019, SVT/PACs/PVCs on BB, HTN, TBI c/b seizure disorder, gastric ulcers, migraines, ELENA (on CPAP), recurrent PE on Xarelto, bipolar disorder, being admitted for headaches, vomiting, diarrhea, myalgias, jaw pain, and flushing in the setting of recently titrating up on Uptravi. Likely having orthostatic hypotension as a result of vomiting/diarrhea/poor PO intake and had 2 falls recently. She remains HDS, although BP softer, on her baseline O2 requirement of 4L. Plan to discuss with Dr. Johnson in the AM Uptravi dosing. She was given 500 ml in ED and will plan to hold home lasix for now in setting of volume depletion. Will ctm volume status and ability to tolerate PO.    As of 3/11, has O2 requirement of 5-6L NC (BL is 4). Likely d/t pulm edema will restart home diuretic and monitor.     Updates 3/11:  - New O2 requirement likely d/t lack of diuresis (holding for hypotension).   - 1x 40 mg IV lasix, will monitor UO and RFP PM. Restarting home PO lasix 3/12  - CHANDRA pending  - Tolerating PO easier, HA improving.       #PAH  #RV failure  ::TTE (9/15/23): EF 65-70%, RV pressure, overload, impaired relaxation of LV diastolic filling, moderately elevated PAP, RVSP 62  ::RHC (10/31/2023) At rest PAp: 70/35 (47); PWP: 18; CO/CI 8.1/4.3. with exercise PAp: 90/40 (57); PWP: 20; CO/CI: 11.2/6.1   ::On 4L at baseline  -Home regimen: Adempas 2.5  mg TID, Opsumit 10 mg daily, Uptravi 1600 mcg bid - >Transitioned to 1400 micrograms per Dr. Johnson  -Restarting Lasix 40 mg daily 3/11/24     #Diarrhea  #Hypokalemia  ::Likely 2/2 Uptravi  -Check stool pathogen panel  -Monitor electrolytes, replete PRN in setting of diarrhea     #Nausea, vomiting 2/2 Uptravi  -Qtc 486, spot dose Zofran     #Orthostatic hypotension  ::Likely volume depleted iso emesis, diarrhea  ::s/p 2x 500 ml IVF given in ED  -Obtain orthostats in AM  -Hold home lasix for now     #HTN  -Hold Losartan 25 mg daily iso softer BP     #Gout flare?   -Continue Allopurinol 100 mg daily  -Would avoid colchicine in setting of diarrhea and would avoid NSAIDs given known ulcers  -Obtain uric acid level. Consider starting steroid if elevated     #Migraines  #Seizures  #Bipolar disorder  -C/w home Amitriptyline 50 mg at bedtime, Prozac 20 mg daily  -C/w home Vraylar 1.5 mg -need to contact psych to continue  -C/w home Vimpat 100 mg bid  -C/w home Lamotrigine 100 mg daily  -Hold Rimegepant 75 mg every other day and Sumatriptan 100 mg bid prn given not currently having migraines  -Hold monthly Emgality 1 ml injections     #Hx SVTs/PACs/PVCs/NSVT  ::Event recorder outpatient revealing PVCs, NSVT, PACs  -Follows with cardiology outpatient, arranging for stress test as outpatient to rule out ischemic dz and referred to EP  -Tele  -Metop tartrate 50 mg BID, consider holding if softer Bps  -K>4, Mg>2     #Hx gastric ulcers  -Omeprazole 40 mg daily->panto 40 mg  -Sucralfate 1g q6h      #Recurrent PEs  ::unprovoked in 2014, recurrence after stopping AC  -C/w home Xarelto 10 mg daily     #Mild ELENA  -RT c/s for nightly CPAP     #Allergies: Loratidine 10 mg daily prn  #Neuropathic pain: C/w home Gabapentin 300 mg every morning, 600 every night  #Misc: c/w home vit C     F: cautious pHTN  E: replete PRN  N: regular, 1.5L fluid restriction  A: PIV    DVT PPx: xarelto   GI PPx: PPI     CODE STATUS: DNR/DNI (confirmed on  admission)  POA: Addi () [phone]     Reinaldo Cespedes MD PhD  PGY1 Medicine

## 2024-03-11 NOTE — SIGNIFICANT EVENT
RAPID RESPONSE RN NOTE:       03/11/24 0837   Onset Documentation   Rapid Response Initiated By Radar auto page   [place] [facility] [place] [id number]   Pager Time [phone]   Arrival Time 0856   Event End Time 0915   Level II Called No   Primary Reason for Call Radar auto page  (RADAR score = 8)     RADAR alert received. Temp 36.3, HR 84, RR 24, BP 92/46, pulse ox 93%. On my arrival, RR 24 with pulse ox 93% on 5LNC.  BP recheck 106/65.  Pt does endorse shortness of breath at rest, increases with exertion and eating.  Also, states she has mild dizziness with ambulation.  States this is about the same as yesterday with no real improvement with nebulizer treatments this AM.  Primary team MD Cespedes and Angi notified of above findings.   No further rapid response interventions at this time. Please page rapid for any clinical deterioration or assistance needed.

## 2024-03-11 NOTE — PROGRESS NOTES
3/11/24 1320 Transitional Care Coordinator Notes:    Per Venkatesh team, patient is volume overloaded, plan is to diurese. Anticipated discharge date set for 1-3 days. Will continue to monitor for updates.                 Assessment/Plan   Principal Problem:    Allergic reaction to drug, initial encounter    Discharge Plans: discharge to home           Paola Nash RN

## 2024-03-12 ENCOUNTER — APPOINTMENT (OUTPATIENT)
Dept: CARDIOLOGY | Facility: HOSPITAL | Age: 49
DRG: 556 | End: 2024-03-12
Payer: COMMERCIAL

## 2024-03-12 LAB
ALBUMIN SERPL BCP-MCNC: 3.5 G/DL (ref 3.4–5)
ALBUMIN SERPL BCP-MCNC: 3.6 G/DL (ref 3.4–5)
ANION GAP SERPL CALC-SCNC: 14 MMOL/L (ref 10–20)
ANION GAP SERPL CALC-SCNC: 19 MMOL/L (ref 10–20)
AORTIC VALVE MEAN GRADIENT: 7.2 MMHG
AORTIC VALVE PEAK VELOCITY: 1.88 M/S
AV PEAK GRADIENT: 14.2 MMHG
AVA (PEAK VEL): 1.88 CM2
AVA (VTI): 2.04 CM2
BASOPHILS # BLD AUTO: 0.01 X10*3/UL (ref 0–0.1)
BASOPHILS NFR BLD AUTO: 0.2 %
BUN SERPL-MCNC: 10 MG/DL (ref 6–23)
BUN SERPL-MCNC: 12 MG/DL (ref 6–23)
CALCIUM SERPL-MCNC: 8.4 MG/DL (ref 8.6–10.6)
CALCIUM SERPL-MCNC: 8.6 MG/DL (ref 8.6–10.6)
CHLORIDE SERPL-SCNC: 105 MMOL/L (ref 98–107)
CHLORIDE SERPL-SCNC: 98 MMOL/L (ref 98–107)
CO2 SERPL-SCNC: 22 MMOL/L (ref 21–32)
CO2 SERPL-SCNC: 31 MMOL/L (ref 21–32)
CREAT SERPL-MCNC: 0.68 MG/DL (ref 0.5–1.05)
CREAT SERPL-MCNC: 0.7 MG/DL (ref 0.5–1.05)
EGFRCR SERPLBLD CKD-EPI 2021: >90 ML/MIN/1.73M*2
EGFRCR SERPLBLD CKD-EPI 2021: >90 ML/MIN/1.73M*2
EJECTION FRACTION APICAL 4 CHAMBER: 65
EJECTION FRACTION: 64 %
EOSINOPHIL # BLD AUTO: 0.13 X10*3/UL (ref 0–0.7)
EOSINOPHIL NFR BLD AUTO: 2.6 %
ERYTHROCYTE [DISTWIDTH] IN BLOOD BY AUTOMATED COUNT: 14.4 % (ref 11.5–14.5)
FLUAV RNA RESP QL NAA+PROBE: NOT DETECTED
FLUBV RNA RESP QL NAA+PROBE: NOT DETECTED
GLUCOSE SERPL-MCNC: 136 MG/DL (ref 74–99)
GLUCOSE SERPL-MCNC: 74 MG/DL (ref 74–99)
HCT VFR BLD AUTO: 40.1 % (ref 36–46)
HGB BLD-MCNC: 11.9 G/DL (ref 12–16)
IMM GRANULOCYTES # BLD AUTO: 0.01 X10*3/UL (ref 0–0.7)
IMM GRANULOCYTES NFR BLD AUTO: 0.2 % (ref 0–0.9)
LEFT ATRIUM VOLUME AREA LENGTH INDEX BSA: 42.5 ML/M2
LEFT VENTRICLE INTERNAL DIMENSION DIASTOLE: 4.95 CM (ref 3.5–6)
LEFT VENTRICULAR OUTFLOW TRACT DIAMETER: 2.01 CM
LYMPHOCYTES # BLD AUTO: 0.93 X10*3/UL (ref 1.2–4.8)
LYMPHOCYTES NFR BLD AUTO: 18.9 %
MAGNESIUM SERPL-MCNC: 2.03 MG/DL (ref 1.6–2.4)
MAGNESIUM SERPL-MCNC: 2.09 MG/DL (ref 1.6–2.4)
MCH RBC QN AUTO: 25.9 PG (ref 26–34)
MCHC RBC AUTO-ENTMCNC: 29.7 G/DL (ref 32–36)
MCV RBC AUTO: 87 FL (ref 80–100)
MITRAL VALVE E/A RATIO: 1.4
MITRAL VALVE E/E' RATIO: 14.27
MONOCYTES # BLD AUTO: 0.36 X10*3/UL (ref 0.1–1)
MONOCYTES NFR BLD AUTO: 7.3 %
NEUTROPHILS # BLD AUTO: 3.49 X10*3/UL (ref 1.2–7.7)
NEUTROPHILS NFR BLD AUTO: 70.8 %
NRBC BLD-RTO: 0 /100 WBCS (ref 0–0)
PHOSPHATE SERPL-MCNC: 4.3 MG/DL (ref 2.5–4.9)
PHOSPHATE SERPL-MCNC: 4.6 MG/DL (ref 2.5–4.9)
PLATELET # BLD AUTO: 234 X10*3/UL (ref 150–450)
POTASSIUM SERPL-SCNC: 3.7 MMOL/L (ref 3.5–5.3)
POTASSIUM SERPL-SCNC: 4.3 MMOL/L (ref 3.5–5.3)
RBC # BLD AUTO: 4.59 X10*6/UL (ref 4–5.2)
RIGHT VENTRICLE FREE WALL PEAK S': 16 CM/S
RIGHT VENTRICLE PEAK SYSTOLIC PRESSURE: 58.2 MMHG
SARS-COV-2 RNA RESP QL NAA+PROBE: NOT DETECTED
SODIUM SERPL-SCNC: 139 MMOL/L (ref 136–145)
SODIUM SERPL-SCNC: 142 MMOL/L (ref 136–145)
TRICUSPID ANNULAR PLANE SYSTOLIC EXCURSION: 3.1 CM
WBC # BLD AUTO: 4.9 X10*3/UL (ref 4.4–11.3)

## 2024-03-12 PROCEDURE — 2500000002 HC RX 250 W HCPCS SELF ADMINISTERED DRUGS (ALT 637 FOR MEDICARE OP, ALT 636 FOR OP/ED)

## 2024-03-12 PROCEDURE — 2500000002 HC RX 250 W HCPCS SELF ADMINISTERED DRUGS (ALT 637 FOR MEDICARE OP, ALT 636 FOR OP/ED): Performed by: STUDENT IN AN ORGANIZED HEALTH CARE EDUCATION/TRAINING PROGRAM

## 2024-03-12 PROCEDURE — 80069 RENAL FUNCTION PANEL: CPT

## 2024-03-12 PROCEDURE — 2500000001 HC RX 250 WO HCPCS SELF ADMINISTERED DRUGS (ALT 637 FOR MEDICARE OP): Performed by: STUDENT IN AN ORGANIZED HEALTH CARE EDUCATION/TRAINING PROGRAM

## 2024-03-12 PROCEDURE — 83735 ASSAY OF MAGNESIUM: CPT

## 2024-03-12 PROCEDURE — 36415 COLL VENOUS BLD VENIPUNCTURE: CPT

## 2024-03-12 PROCEDURE — 87636 SARSCOV2 & INF A&B AMP PRB: CPT

## 2024-03-12 PROCEDURE — 85025 COMPLETE CBC W/AUTO DIFF WBC: CPT

## 2024-03-12 PROCEDURE — 2500000004 HC RX 250 GENERAL PHARMACY W/ HCPCS (ALT 636 FOR OP/ED)

## 2024-03-12 PROCEDURE — 1200000002 HC GENERAL ROOM WITH TELEMETRY DAILY

## 2024-03-12 PROCEDURE — 2500000001 HC RX 250 WO HCPCS SELF ADMINISTERED DRUGS (ALT 637 FOR MEDICARE OP)

## 2024-03-12 PROCEDURE — 93306 TTE W/DOPPLER COMPLETE: CPT | Performed by: INTERNAL MEDICINE

## 2024-03-12 PROCEDURE — 99232 SBSQ HOSP IP/OBS MODERATE 35: CPT

## 2024-03-12 PROCEDURE — 94640 AIRWAY INHALATION TREATMENT: CPT

## 2024-03-12 PROCEDURE — 93306 TTE W/DOPPLER COMPLETE: CPT

## 2024-03-12 PROCEDURE — 2500000005 HC RX 250 GENERAL PHARMACY W/O HCPCS

## 2024-03-12 PROCEDURE — 87632 RESP VIRUS 6-11 TARGETS: CPT

## 2024-03-12 RX ORDER — FUROSEMIDE 10 MG/ML
40 INJECTION INTRAMUSCULAR; INTRAVENOUS ONCE
Status: DISCONTINUED | OUTPATIENT
Start: 2024-03-12 | End: 2024-03-12

## 2024-03-12 RX ORDER — POTASSIUM CHLORIDE 20 MEQ/1
40 TABLET, EXTENDED RELEASE ORAL ONCE
Status: COMPLETED | OUTPATIENT
Start: 2024-03-12 | End: 2024-03-12

## 2024-03-12 RX ORDER — FUROSEMIDE 10 MG/ML
20 INJECTION INTRAMUSCULAR; INTRAVENOUS ONCE
Status: COMPLETED | OUTPATIENT
Start: 2024-03-12 | End: 2024-03-12

## 2024-03-12 RX ADMIN — GABAPENTIN 300 MG: 300 CAPSULE ORAL at 08:43

## 2024-03-12 RX ADMIN — IPRATROPIUM BROMIDE AND ALBUTEROL SULFATE 3 ML: .5; 3 SOLUTION RESPIRATORY (INHALATION) at 21:03

## 2024-03-12 RX ADMIN — SUCRALFATE 1 G: 1 TABLET ORAL at 23:57

## 2024-03-12 RX ADMIN — LACOSAMIDE 100 MG: 100 TABLET, FILM COATED ORAL at 20:42

## 2024-03-12 RX ADMIN — PANTOPRAZOLE SODIUM 40 MG: 40 TABLET, DELAYED RELEASE ORAL at 07:08

## 2024-03-12 RX ADMIN — ACETAMINOPHEN 975 MG: 325 TABLET ORAL at 20:43

## 2024-03-12 RX ADMIN — LAMOTRIGINE 100 MG: 100 TABLET ORAL at 08:45

## 2024-03-12 RX ADMIN — POTASSIUM CHLORIDE 40 MEQ: 1500 TABLET, EXTENDED RELEASE ORAL at 20:42

## 2024-03-12 RX ADMIN — RIOCIGUAT 2.5 MG: 2.5 TABLET, FILM COATED ORAL at 08:48

## 2024-03-12 RX ADMIN — RIOCIGUAT 2.5 MG: 2.5 TABLET, FILM COATED ORAL at 16:09

## 2024-03-12 RX ADMIN — METOPROLOL TARTRATE 50 MG: 50 TABLET, FILM COATED ORAL at 20:43

## 2024-03-12 RX ADMIN — GABAPENTIN 600 MG: 300 CAPSULE ORAL at 20:43

## 2024-03-12 RX ADMIN — SUCRALFATE 1 G: 1 TABLET ORAL at 07:08

## 2024-03-12 RX ADMIN — FLUOXETINE 20 MG: 10 CAPSULE ORAL at 08:45

## 2024-03-12 RX ADMIN — SUCRALFATE 1 G: 1 TABLET ORAL at 00:00

## 2024-03-12 RX ADMIN — SUCRALFATE 1 G: 1 TABLET ORAL at 12:08

## 2024-03-12 RX ADMIN — SUCRALFATE 1 G: 1 TABLET ORAL at 17:09

## 2024-03-12 RX ADMIN — OXYCODONE HYDROCHLORIDE AND ACETAMINOPHEN 500 MG: 500 TABLET ORAL at 08:43

## 2024-03-12 RX ADMIN — ONDANSETRON HYDROCHLORIDE 4 MG: 4 TABLET, FILM COATED ORAL at 18:05

## 2024-03-12 RX ADMIN — FUROSEMIDE 40 MG: 40 TABLET ORAL at 08:45

## 2024-03-12 RX ADMIN — MACITENTAN 10 MG: 10 TABLET, FILM COATED ORAL at 23:01

## 2024-03-12 RX ADMIN — SUMATRIPTAN SUCCINATE 100 MG: 100 TABLET ORAL at 07:25

## 2024-03-12 RX ADMIN — SELEXIPAG 1400 MCG: 200 TABLET, COATED ORAL at 20:43

## 2024-03-12 RX ADMIN — FUROSEMIDE 20 MG: 10 INJECTION, SOLUTION INTRAMUSCULAR; INTRAVENOUS at 10:26

## 2024-03-12 RX ADMIN — METOPROLOL TARTRATE 50 MG: 50 TABLET, FILM COATED ORAL at 08:44

## 2024-03-12 RX ADMIN — ACETAMINOPHEN 975 MG: 325 TABLET ORAL at 16:02

## 2024-03-12 RX ADMIN — IPRATROPIUM BROMIDE AND ALBUTEROL SULFATE 3 ML: .5; 3 SOLUTION RESPIRATORY (INHALATION) at 16:11

## 2024-03-12 RX ADMIN — Medication 400 MG: at 08:43

## 2024-03-12 RX ADMIN — ALLOPURINOL 100 MG: 100 TABLET ORAL at 08:44

## 2024-03-12 RX ADMIN — IPRATROPIUM BROMIDE AND ALBUTEROL SULFATE 3 ML: .5; 3 SOLUTION RESPIRATORY (INHALATION) at 03:32

## 2024-03-12 RX ADMIN — IPRATROPIUM BROMIDE AND ALBUTEROL SULFATE 3 ML: .5; 3 SOLUTION RESPIRATORY (INHALATION) at 11:00

## 2024-03-12 RX ADMIN — RIOCIGUAT 2.5 MG: 2.5 TABLET, FILM COATED ORAL at 20:43

## 2024-03-12 RX ADMIN — AMITRIPTYLINE HYDROCHLORIDE 50 MG: 50 TABLET, FILM COATED ORAL at 20:43

## 2024-03-12 RX ADMIN — SELEXIPAG 1400 MCG: 200 TABLET, COATED ORAL at 08:48

## 2024-03-12 RX ADMIN — LACOSAMIDE 100 MG: 100 TABLET, FILM COATED ORAL at 08:44

## 2024-03-12 RX ADMIN — ACETAMINOPHEN 975 MG: 325 TABLET ORAL at 08:44

## 2024-03-12 RX ADMIN — RIVAROXABAN 10 MG: 20 TABLET, FILM COATED ORAL at 08:44

## 2024-03-12 ASSESSMENT — COGNITIVE AND FUNCTIONAL STATUS - GENERAL
WALKING IN HOSPITAL ROOM: A LOT
MOBILITY SCORE: 17
MOVING TO AND FROM BED TO CHAIR: A LITTLE
TURNING FROM BACK TO SIDE WHILE IN FLAT BAD: A LITTLE
STANDING UP FROM CHAIR USING ARMS: A LITTLE
DAILY ACTIVITIY SCORE: 24
CLIMB 3 TO 5 STEPS WITH RAILING: A LOT

## 2024-03-12 ASSESSMENT — PAIN SCALES - GENERAL
PAINLEVEL_OUTOF10: 0 - NO PAIN
PAINLEVEL_OUTOF10: 0 - NO PAIN

## 2024-03-12 ASSESSMENT — PAIN - FUNCTIONAL ASSESSMENT
PAIN_FUNCTIONAL_ASSESSMENT: 0-10

## 2024-03-12 NOTE — CARE PLAN
Problem: Discharge Planning  Goal: Discharge to home or other facility with appropriate resources  Outcome: Progressing     Problem: Chronic Conditions and Co-morbidities  Goal: Patient's chronic conditions and co-morbidity symptoms are monitored and maintained or improved  Outcome: Progressing     Problem: Nutrition  Goal: Oral intake greater than 50%  Outcome: Progressing  Goal: Oral intake greater 75%  Outcome: Progressing  Goal: Consume prescribed supplement  Outcome: Progressing  Goal: Adequate PO fluid intake  Outcome: Progressing  Goal: Nutrition support goals are met within 48 hrs  Outcome: Progressing  Goal: Nutrition support is meeting 75% of nutrient needs  Outcome: Progressing  Goal: BG  mg/dL  Outcome: Progressing  Goal: Lab values WNL  Outcome: Progressing  Goal: Electrolytes WNL  Outcome: Progressing  Goal: Promote healing  Outcome: Progressing  Goal: Maintain stable weight  Outcome: Progressing  Goal: Reduce weight from edema/fluid  Outcome: Progressing  Goal: Gradual weight gain  Outcome: Progressing  Goal: Improve ostomy output  Outcome: Progressing     Problem: Respiratory  Goal: Clear secretions with interventions this shift  Outcome: Progressing  Goal: Minimize anxiety/maximize coping throughout shift  Outcome: Progressing  Goal: Minimal/no exertional discomfort or dyspnea this shift  Outcome: Progressing  Goal: No signs of respiratory distress (eg. Use of accessory muscles. Peds grunting)  Outcome: Progressing  Goal: Patent airway maintained this shift  Outcome: Progressing  Goal: Tolerate mechanical ventilation evidenced by VS/agitation level this shift  Outcome: Progressing  Goal: Tolerate pulmonary toileting this shift  Outcome: Progressing  Goal: Verbalize decreased shortness of breath this shift  Outcome: Progressing  Goal: Wean oxygen to maintain O2 saturation per order/standard this shift  Outcome: Progressing  Goal: Increase self care and/or family involvement in next 24  hours  Outcome: Progressing       The clinical goals for the shift include Patient will have no SOB throughout the shift

## 2024-03-12 NOTE — PROGRESS NOTES
"  Subjective     No acute events overnight.  She is concerned about sore throat and HA.     Objective   /69   Pulse 84   Temp 36.4 °C (97.5 °F)   Resp 18   Ht 1.626 m (5' 4\")   Wt 107 kg (236 lb 8.9 oz)   SpO2 94%   BMI 40.60 kg/m²       Intake/Output last 3 Shifts:    Intake/Output Summary (Last 24 hours) at 3/12/2024 0938  Last data filed at 3/11/2024 1913  Gross per 24 hour   Intake 240 ml   Output 800 ml   Net -560 ml        Physical Exam:  General appearance/Constitutional: No acute distress  Eyes: Sclera anicteric  Head & Neck: Moist mucous membranes  Respiratory/Chest Wall: Clear breath sounds  Cardiovascular: Regular rate and rhythm  Gastrointestinal: Soft, nontender, nondistended  Neurologic: Awake, alert, oriented to person place and time  Extremities: No lower extremity edema  Psychiatric: Appropriate mood  Lines/Drains: pIV    Scheduled medications  [Held by provider] cariprazine, 1.5 mg, oral, Daily  riociguat, 2.5 mg, oral, TID  macitentan, 10 mg, oral, Daily  selexipag, 1,400 mcg, oral, BID  acetaminophen, 975 mg, oral, TID  allopurinol, 100 mg, oral, Daily  amitriptyline, 50 mg, oral, Nightly  ascorbic acid, 500 mg, oral, Daily  FLUoxetine, 20 mg, oral, Daily  furosemide, 20 mg, intravenous, Once  gabapentin, 300 mg, oral, q AM  gabapentin, 600 mg, oral, Nightly  ipratropium-albuteroL, 3 mL, nebulization, q6h  lacosamide, 100 mg, oral, BID  lamoTRIgine, 100 mg, oral, Daily  magnesium oxide, 400 mg, oral, Daily  metoprolol tartrate, 50 mg, oral, BID  pantoprazole, 40 mg, oral, Daily before breakfast  rivaroxaban, 10 mg, oral, Daily  sucralfate, 1 g, oral, q6h SONIA      Continuous medications     PRN medications  PRN medications: loperamide, loratadine, methocarbamol, ondansetron, oxygen, SUMAtriptan     Relevant Results    Lab Results   Component Value Date    WBC 4.3 (L) 03/11/2024    HGB 9.4 (L) 03/11/2024    HCT 28.3 (L) 03/11/2024    MCV 81 03/11/2024     03/11/2024      Lab " Results   Component Value Date    GLUCOSE 123 (H) 03/11/2024    CALCIUM 8.6 03/11/2024     03/11/2024    K 3.5 03/11/2024    CO2 25 03/11/2024     03/11/2024    BUN 10 03/11/2024    CREATININE 0.76 03/11/2024      Lab Results   Component Value Date    ALT 10 03/09/2024    AST 12 03/09/2024    ALKPHOS 90 03/09/2024    BILITOT 0.3 03/09/2024        Assessment and Plan by Problem:   Principal Problem:    Allergic reaction to drug, initial encounter    Jazmin Hein is a 49 y.o. female w/ PMHx PAH (Uptravi, Adempas, Opsimut), PPM 2019, SVT/PACs/PVCs on BB, HTN, TBI c/b seizure disorder, gastric ulcers, migraines, ELENA (on CPAP), recurrent PE on Xarelto, bipolar disorder, being admitted for headaches, vomiting, diarrhea, myalgias, jaw pain, and flushing in the setting of recently titrating up on Uptravi. Likely having orthostatic hypotension as a result of vomiting/diarrhea/poor PO intake and had 2 falls recently. She remains HDS, although BP softer, on her baseline O2 requirement of 4L. Plan to discuss with Dr. Johnson in the AM Uptravi dosing. She was given 500 ml in ED and will plan to hold home lasix for now in setting of volume depletion. Will ctm volume status and ability to tolerate PO.    As of 3/11, has O2 requirement of 5-6L NC (BL is 4). Likely d/t pulm edema will start IV diuresis and monitor.      Updates 3/12:  - Back on 5L O2.   - Reorder 1x 40 mg IV lasix, will monitor UO and RFP PM.   - TTE pending  - Tolerating PO easier, HA worse today, will cTM.   - Endorses sore throat, erythematous but airway looks ok. Covid/ flu negative.  - Resp. Viral panel pending.       #PAH  #RV failure  ::TTE (9/15/23): EF 65-70%, RV pressure, overload, impaired relaxation of LV diastolic filling, moderately elevated PAP, RVSP 62  ::RHC (10/31/2023) At rest PAp: 70/35 (47); PWP: 18; CO/CI 8.1/4.3. with exercise PAp: 90/40 (57); PWP: 20; CO/CI: 11.2/6.1   ::On 4L at baseline  -Home regimen: Adempas 2.5 mg TID,  Opsumit 10 mg daily, Uptravi 1600 mcg bid - >Transitioned to 1400 micrograms per Dr. Johnson  -Continue to diurese as tolerated (40 mg IV push every day, assess UO and RFP)     #Diarrhea  #Hypokalemia  ::Likely 2/2 Uptravi  -Check stool pathogen panel  -Monitor electrolytes, replete PRN in setting of diarrhea     #Nausea, vomiting 2/2 Uptravi  -Qtc 486, spot dose Zofran     #Orthostatic hypotension  ::Likely volume depleted iso emesis, diarrhea  ::s/p 2x 500 ml IVF given in ED  -Obtain orthostats in AM  -Hold home lasix for now     #HTN  -Hold Losartan 25 mg daily iso softer BP     #Gout flare?   -Continue Allopurinol 100 mg daily  -Would avoid colchicine in setting of diarrhea and would avoid NSAIDs given known ulcers  -Obtain uric acid level. Consider starting steroid if elevated     #Migraines  #Seizures  #Bipolar disorder  -C/w home Amitriptyline 50 mg at bedtime, Prozac 20 mg daily  -C/w home Vraylar 1.5 mg -need to contact psych to continue  -C/w home Vimpat 100 mg bid  -C/w home Lamotrigine 100 mg daily  -Hold Rimegepant 75 mg every other day and Sumatriptan 100 mg bid prn given not currently having migraines  -Hold monthly Emgality 1 ml injections     #Hx SVTs/PACs/PVCs/NSVT  ::Event recorder outpatient revealing PVCs, NSVT, PACs  -Follows with cardiology outpatient, arranging for stress test as outpatient to rule out ischemic dz and referred to EP  -Tele  -Metop tartrate 50 mg BID, consider holding if softer Bps  -K>4, Mg>2     #Hx gastric ulcers  -Omeprazole 40 mg daily->panto 40 mg  -Sucralfate 1g q6h      #Recurrent PEs  ::unprovoked in 2014, recurrence after stopping AC  -C/w home Xarelto 10 mg daily     #Mild ELENA  -RT c/s for nightly CPAP     #Allergies: Loratidine 10 mg daily prn  #Neuropathic pain: C/w home Gabapentin 300 mg every morning, 600 every night  #Misc: c/w home vit C     F: cautious pHTN  E: replete PRN  N: regular, 1.5L fluid restriction  A: PIV    DVT PPx: xarelto   GI PPx: PPI     CODE  STATUS: DNR/DNI (confirmed on admission)  POA: Addi () [phone]     Reinaldo Cespedes MD PhD  PGY1 Medicine

## 2024-03-12 NOTE — SIGNIFICANT EVENT
NENA pg - SpO2 90%    Pt asleep on 5L NC - VS recheck with SpO2 90%    Pt states no SOB or discomfort    Placed pt on home Cpap

## 2024-03-12 NOTE — SIGNIFICANT EVENT
Rapid Response RN Note    Rapid response page for RADAR score 6 due to the following VS: T 37.4 °Celsius;  ; RR 22; /71; SPO2 90%.     Reviewed above VS with bedside RN.  VS within patient's current trends.  Patient denied pain, shortness of breath, dizziness or lightheadedness.  No interventions by rapid response team indicated at this time.      Staff to page rapid response for any concerns or acute change in condition/VS.

## 2024-03-12 NOTE — CARE PLAN
Problem: Pain - Adult  Goal: Verbalizes/displays adequate comfort level or baseline comfort level  Outcome: Met     Problem: Safety - Adult  Goal: Free from fall injury  Outcome: Met   The patient's goals for the shift include      The clinical goals for the shift include pt will requiire less O2 this shift    Problem: Respiratory  Goal: Wean oxygen to maintain O2 saturation per order/standard this shift  Outcome: Progressing

## 2024-03-13 ENCOUNTER — APPOINTMENT (OUTPATIENT)
Dept: CARDIOLOGY | Facility: HOSPITAL | Age: 49
DRG: 556 | End: 2024-03-13
Payer: COMMERCIAL

## 2024-03-13 LAB
ADENOVIRUS RVP, VIRC: NOT DETECTED
ALBUMIN SERPL BCP-MCNC: 3.7 G/DL (ref 3.4–5)
ALBUMIN SERPL BCP-MCNC: 3.8 G/DL (ref 3.4–5)
ANION GAP SERPL CALC-SCNC: 10 MMOL/L (ref 10–20)
ANION GAP SERPL CALC-SCNC: 15 MMOL/L (ref 10–20)
ATRIAL RATE: 69 BPM
BASOPHILS # BLD AUTO: 0.01 X10*3/UL (ref 0–0.1)
BASOPHILS NFR BLD AUTO: 0.3 %
BUN SERPL-MCNC: 13 MG/DL (ref 6–23)
BUN SERPL-MCNC: 15 MG/DL (ref 6–23)
CALCIUM SERPL-MCNC: 8.6 MG/DL (ref 8.6–10.6)
CALCIUM SERPL-MCNC: 9 MG/DL (ref 8.6–10.6)
CHLORIDE SERPL-SCNC: 102 MMOL/L (ref 98–107)
CHLORIDE SERPL-SCNC: 102 MMOL/L (ref 98–107)
CO2 SERPL-SCNC: 27 MMOL/L (ref 21–32)
CO2 SERPL-SCNC: 32 MMOL/L (ref 21–32)
CREAT SERPL-MCNC: 0.71 MG/DL (ref 0.5–1.05)
CREAT SERPL-MCNC: 0.84 MG/DL (ref 0.5–1.05)
EGFRCR SERPLBLD CKD-EPI 2021: 85 ML/MIN/1.73M*2
EGFRCR SERPLBLD CKD-EPI 2021: >90 ML/MIN/1.73M*2
ENTEROVIRUS/RHINOVIRUS RVP, VIRC: NOT DETECTED
EOSINOPHIL # BLD AUTO: 0.12 X10*3/UL (ref 0–0.7)
EOSINOPHIL NFR BLD AUTO: 3.4 %
ERYTHROCYTE [DISTWIDTH] IN BLOOD BY AUTOMATED COUNT: 14.4 % (ref 11.5–14.5)
GLUCOSE SERPL-MCNC: 107 MG/DL (ref 74–99)
GLUCOSE SERPL-MCNC: 93 MG/DL (ref 74–99)
HCT VFR BLD AUTO: 35.9 % (ref 36–46)
HGB BLD-MCNC: 11.5 G/DL (ref 12–16)
HUMAN BOCAVIRUS RVP, VIRC: NOT DETECTED
HUMAN CORONAVIRUS RVP, VIRC: NOT DETECTED
IMM GRANULOCYTES # BLD AUTO: 0.01 X10*3/UL (ref 0–0.7)
IMM GRANULOCYTES NFR BLD AUTO: 0.3 % (ref 0–0.9)
INFLUENZA A , VIRC: NOT DETECTED
INFLUENZA A H1N1-09 , VIRC: NOT DETECTED
INFLUENZA B PCR, VIRC: NOT DETECTED
LYMPHOCYTES # BLD AUTO: 0.94 X10*3/UL (ref 1.2–4.8)
LYMPHOCYTES NFR BLD AUTO: 26.5 %
MAGNESIUM SERPL-MCNC: 1.96 MG/DL (ref 1.6–2.4)
MAGNESIUM SERPL-MCNC: 2.19 MG/DL (ref 1.6–2.4)
MCH RBC QN AUTO: 27.2 PG (ref 26–34)
MCHC RBC AUTO-ENTMCNC: 32 G/DL (ref 32–36)
MCV RBC AUTO: 85 FL (ref 80–100)
METAPNEUMOVIRUS , VIRC: NOT DETECTED
MONOCYTES # BLD AUTO: 0.28 X10*3/UL (ref 0.1–1)
MONOCYTES NFR BLD AUTO: 7.9 %
NEUTROPHILS # BLD AUTO: 2.19 X10*3/UL (ref 1.2–7.7)
NEUTROPHILS NFR BLD AUTO: 61.6 %
NRBC BLD-RTO: 0 /100 WBCS (ref 0–0)
P AXIS: 33 DEGREES
P OFFSET: 179 MS
P ONSET: 125 MS
PARAINFLUENZA PCR, VIRC: NOT DETECTED
PHOSPHATE SERPL-MCNC: 3.3 MG/DL (ref 2.5–4.9)
PHOSPHATE SERPL-MCNC: 4.6 MG/DL (ref 2.5–4.9)
PLATELET # BLD AUTO: 255 X10*3/UL (ref 150–450)
POTASSIUM SERPL-SCNC: 4 MMOL/L (ref 3.5–5.3)
POTASSIUM SERPL-SCNC: 4.4 MMOL/L (ref 3.5–5.3)
PR INTERVAL: 194 MS
Q ONSET: 222 MS
QRS COUNT: 11 BEATS
QRS DURATION: 84 MS
QT INTERVAL: 428 MS
QTC CALCULATION(BAZETT): 458 MS
QTC FREDERICIA: 448 MS
R AXIS: 124 DEGREES
RBC # BLD AUTO: 4.23 X10*6/UL (ref 4–5.2)
RSV PCR, RVP, VIRC: NOT DETECTED
SODIUM SERPL-SCNC: 140 MMOL/L (ref 136–145)
SODIUM SERPL-SCNC: 140 MMOL/L (ref 136–145)
T AXIS: 67 DEGREES
T OFFSET: 436 MS
VENTRICULAR RATE: 69 BPM
WBC # BLD AUTO: 3.6 X10*3/UL (ref 4.4–11.3)

## 2024-03-13 PROCEDURE — 2500000002 HC RX 250 W HCPCS SELF ADMINISTERED DRUGS (ALT 637 FOR MEDICARE OP, ALT 636 FOR OP/ED)

## 2024-03-13 PROCEDURE — 80069 RENAL FUNCTION PANEL: CPT

## 2024-03-13 PROCEDURE — 1200000002 HC GENERAL ROOM WITH TELEMETRY DAILY

## 2024-03-13 PROCEDURE — 99232 SBSQ HOSP IP/OBS MODERATE 35: CPT

## 2024-03-13 PROCEDURE — 83735 ASSAY OF MAGNESIUM: CPT

## 2024-03-13 PROCEDURE — 93010 ELECTROCARDIOGRAM REPORT: CPT | Performed by: INTERNAL MEDICINE

## 2024-03-13 PROCEDURE — 94640 AIRWAY INHALATION TREATMENT: CPT

## 2024-03-13 PROCEDURE — 2500000001 HC RX 250 WO HCPCS SELF ADMINISTERED DRUGS (ALT 637 FOR MEDICARE OP)

## 2024-03-13 PROCEDURE — 93005 ELECTROCARDIOGRAM TRACING: CPT

## 2024-03-13 PROCEDURE — 36415 COLL VENOUS BLD VENIPUNCTURE: CPT

## 2024-03-13 PROCEDURE — 2500000002 HC RX 250 W HCPCS SELF ADMINISTERED DRUGS (ALT 637 FOR MEDICARE OP, ALT 636 FOR OP/ED): Performed by: STUDENT IN AN ORGANIZED HEALTH CARE EDUCATION/TRAINING PROGRAM

## 2024-03-13 PROCEDURE — 85025 COMPLETE CBC W/AUTO DIFF WBC: CPT

## 2024-03-13 PROCEDURE — 2500000004 HC RX 250 GENERAL PHARMACY W/ HCPCS (ALT 636 FOR OP/ED)

## 2024-03-13 PROCEDURE — 2500000005 HC RX 250 GENERAL PHARMACY W/O HCPCS

## 2024-03-13 PROCEDURE — 2500000001 HC RX 250 WO HCPCS SELF ADMINISTERED DRUGS (ALT 637 FOR MEDICARE OP): Performed by: STUDENT IN AN ORGANIZED HEALTH CARE EDUCATION/TRAINING PROGRAM

## 2024-03-13 RX ORDER — IPRATROPIUM BROMIDE AND ALBUTEROL SULFATE 2.5; .5 MG/3ML; MG/3ML
3 SOLUTION RESPIRATORY (INHALATION)
Status: DISCONTINUED | OUTPATIENT
Start: 2024-03-14 | End: 2024-03-22 | Stop reason: HOSPADM

## 2024-03-13 RX ORDER — FUROSEMIDE 10 MG/ML
40 INJECTION INTRAMUSCULAR; INTRAVENOUS ONCE
Status: COMPLETED | OUTPATIENT
Start: 2024-03-13 | End: 2024-03-13

## 2024-03-13 RX ORDER — ONDANSETRON HYDROCHLORIDE 2 MG/ML
4 INJECTION, SOLUTION INTRAVENOUS ONCE
Status: COMPLETED | OUTPATIENT
Start: 2024-03-13 | End: 2024-03-13

## 2024-03-13 RX ADMIN — METOPROLOL TARTRATE 50 MG: 50 TABLET, FILM COATED ORAL at 20:44

## 2024-03-13 RX ADMIN — ACETAMINOPHEN 975 MG: 325 TABLET ORAL at 15:37

## 2024-03-13 RX ADMIN — OXYCODONE HYDROCHLORIDE AND ACETAMINOPHEN 500 MG: 500 TABLET ORAL at 08:39

## 2024-03-13 RX ADMIN — SELEXIPAG 1200 MCG: 200 TABLET, COATED ORAL at 21:00

## 2024-03-13 RX ADMIN — Medication 1 LOZENGE: at 20:44

## 2024-03-13 RX ADMIN — IPRATROPIUM BROMIDE AND ALBUTEROL SULFATE 3 ML: .5; 3 SOLUTION RESPIRATORY (INHALATION) at 11:34

## 2024-03-13 RX ADMIN — LACOSAMIDE 100 MG: 100 TABLET, FILM COATED ORAL at 20:43

## 2024-03-13 RX ADMIN — Medication 400 MG: at 08:39

## 2024-03-13 RX ADMIN — METOPROLOL TARTRATE 50 MG: 50 TABLET, FILM COATED ORAL at 08:39

## 2024-03-13 RX ADMIN — PANTOPRAZOLE SODIUM 40 MG: 40 TABLET, DELAYED RELEASE ORAL at 06:10

## 2024-03-13 RX ADMIN — ONDANSETRON 4 MG: 2 INJECTION INTRAMUSCULAR; INTRAVENOUS at 20:43

## 2024-03-13 RX ADMIN — ACETAMINOPHEN 975 MG: 325 TABLET ORAL at 20:44

## 2024-03-13 RX ADMIN — Medication 1 LOZENGE: at 11:02

## 2024-03-13 RX ADMIN — RIOCIGUAT 2.5 MG: 2.5 TABLET, FILM COATED ORAL at 08:44

## 2024-03-13 RX ADMIN — GABAPENTIN 300 MG: 300 CAPSULE ORAL at 08:40

## 2024-03-13 RX ADMIN — IPRATROPIUM BROMIDE AND ALBUTEROL SULFATE 3 ML: .5; 3 SOLUTION RESPIRATORY (INHALATION) at 21:04

## 2024-03-13 RX ADMIN — SUMATRIPTAN SUCCINATE 100 MG: 100 TABLET ORAL at 06:12

## 2024-03-13 RX ADMIN — IPRATROPIUM BROMIDE AND ALBUTEROL SULFATE 3 ML: .5; 3 SOLUTION RESPIRATORY (INHALATION) at 03:06

## 2024-03-13 RX ADMIN — GABAPENTIN 600 MG: 300 CAPSULE ORAL at 20:43

## 2024-03-13 RX ADMIN — RIVAROXABAN 10 MG: 20 TABLET, FILM COATED ORAL at 08:39

## 2024-03-13 RX ADMIN — RIOCIGUAT 2.5 MG: 2.5 TABLET, FILM COATED ORAL at 15:38

## 2024-03-13 RX ADMIN — FUROSEMIDE 40 MG: 10 INJECTION, SOLUTION INTRAVENOUS at 11:02

## 2024-03-13 RX ADMIN — Medication 1 LOZENGE: at 08:41

## 2024-03-13 RX ADMIN — SUCRALFATE 1 G: 1 TABLET ORAL at 06:10

## 2024-03-13 RX ADMIN — ACETAMINOPHEN 975 MG: 325 TABLET ORAL at 08:39

## 2024-03-13 RX ADMIN — ALLOPURINOL 100 MG: 100 TABLET ORAL at 08:39

## 2024-03-13 RX ADMIN — SUCRALFATE 1 G: 1 TABLET ORAL at 11:26

## 2024-03-13 RX ADMIN — SUMATRIPTAN SUCCINATE 100 MG: 100 TABLET ORAL at 20:43

## 2024-03-13 RX ADMIN — FLUOXETINE 20 MG: 10 CAPSULE ORAL at 08:40

## 2024-03-13 RX ADMIN — IPRATROPIUM BROMIDE AND ALBUTEROL SULFATE 3 ML: .5; 3 SOLUTION RESPIRATORY (INHALATION) at 15:50

## 2024-03-13 RX ADMIN — SELEXIPAG 1400 MCG: 200 TABLET, COATED ORAL at 08:43

## 2024-03-13 RX ADMIN — AMITRIPTYLINE HYDROCHLORIDE 50 MG: 50 TABLET, FILM COATED ORAL at 20:43

## 2024-03-13 RX ADMIN — ONDANSETRON HYDROCHLORIDE 4 MG: 4 TABLET, FILM COATED ORAL at 18:55

## 2024-03-13 RX ADMIN — LACOSAMIDE 100 MG: 100 TABLET, FILM COATED ORAL at 08:39

## 2024-03-13 RX ADMIN — SUCRALFATE 1 G: 1 TABLET ORAL at 17:59

## 2024-03-13 RX ADMIN — LAMOTRIGINE 100 MG: 100 TABLET ORAL at 12:57

## 2024-03-13 RX ADMIN — RIOCIGUAT 2.5 MG: 2.5 TABLET, FILM COATED ORAL at 20:41

## 2024-03-13 RX ADMIN — ONDANSETRON HYDROCHLORIDE 4 MG: 4 TABLET, FILM COATED ORAL at 08:39

## 2024-03-13 RX ADMIN — MACITENTAN 10 MG: 10 TABLET, FILM COATED ORAL at 22:00

## 2024-03-13 ASSESSMENT — COGNITIVE AND FUNCTIONAL STATUS - GENERAL
CLIMB 3 TO 5 STEPS WITH RAILING: A LOT
CLIMB 3 TO 5 STEPS WITH RAILING: A LOT
DRESSING REGULAR LOWER BODY CLOTHING: A LITTLE
CLIMB 3 TO 5 STEPS WITH RAILING: A LOT
PERSONAL GROOMING: A LITTLE
MOVING TO AND FROM BED TO CHAIR: A LITTLE
PERSONAL GROOMING: A LITTLE
TURNING FROM BACK TO SIDE WHILE IN FLAT BAD: A LITTLE
PERSONAL GROOMING: A LITTLE
DAILY ACTIVITIY SCORE: 18
MOVING TO AND FROM BED TO CHAIR: A LITTLE
WALKING IN HOSPITAL ROOM: A LITTLE
TOILETING: A LITTLE
STANDING UP FROM CHAIR USING ARMS: A LITTLE
MOVING TO AND FROM BED TO CHAIR: A LITTLE
MOVING TO AND FROM BED TO CHAIR: A LITTLE
CLIMB 3 TO 5 STEPS WITH RAILING: A LOT
STANDING UP FROM CHAIR USING ARMS: A LITTLE
PERSONAL GROOMING: A LITTLE
STANDING UP FROM CHAIR USING ARMS: A LITTLE
STANDING UP FROM CHAIR USING ARMS: A LITTLE
DRESSING REGULAR UPPER BODY CLOTHING: A LITTLE
TOILETING: A LITTLE
HELP NEEDED FOR BATHING: A LITTLE
DRESSING REGULAR LOWER BODY CLOTHING: A LITTLE
EATING MEALS: A LITTLE
TOILETING: A LITTLE
HELP NEEDED FOR BATHING: A LITTLE
WALKING IN HOSPITAL ROOM: A LITTLE
EATING MEALS: A LITTLE
STANDING UP FROM CHAIR USING ARMS: A LITTLE
DRESSING REGULAR UPPER BODY CLOTHING: A LITTLE
HELP NEEDED FOR BATHING: A LITTLE
WALKING IN HOSPITAL ROOM: A LITTLE
CLIMB 3 TO 5 STEPS WITH RAILING: A LOT
HELP NEEDED FOR BATHING: A LITTLE
HELP NEEDED FOR BATHING: A LITTLE
MOVING TO AND FROM BED TO CHAIR: A LITTLE
WALKING IN HOSPITAL ROOM: A LITTLE
DAILY ACTIVITIY SCORE: 20
STANDING UP FROM CHAIR USING ARMS: A LITTLE
DRESSING REGULAR LOWER BODY CLOTHING: A LITTLE
TURNING FROM BACK TO SIDE WHILE IN FLAT BAD: A LITTLE
PERSONAL GROOMING: A LITTLE
STANDING UP FROM CHAIR USING ARMS: A LITTLE
HELP NEEDED FOR BATHING: A LITTLE
HELP NEEDED FOR BATHING: A LITTLE
MOVING TO AND FROM BED TO CHAIR: A LITTLE
TOILETING: A LITTLE
DRESSING REGULAR LOWER BODY CLOTHING: A LITTLE
DRESSING REGULAR LOWER BODY CLOTHING: A LITTLE
DAILY ACTIVITIY SCORE: 18
MOVING TO AND FROM BED TO CHAIR: A LITTLE
TURNING FROM BACK TO SIDE WHILE IN FLAT BAD: A LITTLE
MOBILITY SCORE: 18
STANDING UP FROM CHAIR USING ARMS: A LITTLE
TOILETING: A LITTLE
MOBILITY SCORE: 18
MOVING TO AND FROM BED TO CHAIR: A LITTLE
EATING MEALS: A LITTLE
WALKING IN HOSPITAL ROOM: A LITTLE
DRESSING REGULAR LOWER BODY CLOTHING: A LITTLE
WALKING IN HOSPITAL ROOM: A LITTLE
WALKING IN HOSPITAL ROOM: A LITTLE
TOILETING: A LITTLE
MOBILITY SCORE: 19
DRESSING REGULAR UPPER BODY CLOTHING: A LITTLE
WALKING IN HOSPITAL ROOM: A LITTLE
TURNING FROM BACK TO SIDE WHILE IN FLAT BAD: A LITTLE
CLIMB 3 TO 5 STEPS WITH RAILING: A LOT
DRESSING REGULAR UPPER BODY CLOTHING: A LITTLE
CLIMB 3 TO 5 STEPS WITH RAILING: A LOT
TOILETING: A LITTLE
TURNING FROM BACK TO SIDE WHILE IN FLAT BAD: A LITTLE
HELP NEEDED FOR BATHING: A LITTLE
DRESSING REGULAR UPPER BODY CLOTHING: A LITTLE
EATING MEALS: A LITTLE
DRESSING REGULAR LOWER BODY CLOTHING: A LITTLE
DRESSING REGULAR UPPER BODY CLOTHING: A LITTLE
TOILETING: A LITTLE
DRESSING REGULAR UPPER BODY CLOTHING: A LITTLE
CLIMB 3 TO 5 STEPS WITH RAILING: A LOT
DRESSING REGULAR LOWER BODY CLOTHING: A LITTLE
DRESSING REGULAR UPPER BODY CLOTHING: A LITTLE
EATING MEALS: A LITTLE

## 2024-03-13 ASSESSMENT — PAIN - FUNCTIONAL ASSESSMENT
PAIN_FUNCTIONAL_ASSESSMENT: 0-10

## 2024-03-13 ASSESSMENT — PAIN SCALES - GENERAL
PAINLEVEL_OUTOF10: 9
PAINLEVEL_OUTOF10: 0 - NO PAIN
PAINLEVEL_OUTOF10: 9

## 2024-03-13 ASSESSMENT — PAIN DESCRIPTION - LOCATION: LOCATION: HEAD

## 2024-03-13 NOTE — PROGRESS NOTES
"  Subjective     No acute events overnight.  She is concerned about emesis after dinner night prior. Resolved w/ zofran push.    Objective   /57   Pulse 76   Temp 36.3 °C (97.3 °F)   Resp 16   Ht 1.626 m (5' 4\")   Wt 108 kg (238 lb 3.2 oz)   SpO2 95%   BMI 40.89 kg/m²       Intake/Output last 3 Shifts:    Intake/Output Summary (Last 24 hours) at 3/13/2024 1430  Last data filed at 3/13/2024 1301  Gross per 24 hour   Intake 960 ml   Output 2400 ml   Net -1440 ml        Physical Exam:  General appearance/Constitutional: No acute distress  Eyes: Sclera anicteric  Head & Neck: Moist mucous membranes  Respiratory/Chest Wall: Clear breath sounds  Cardiovascular: Regular rate and rhythm  Gastrointestinal: Soft, nontender, nondistended  Neurologic: Awake, alert, oriented to person place and time  Extremities: No lower extremity edema  Psychiatric: Appropriate mood  Lines/Drains: pIV    Scheduled medications  [Held by provider] cariprazine, 1.5 mg, oral, Daily  riociguat, 2.5 mg, oral, TID  macitentan, 10 mg, oral, Daily  selexipag, 1,400 mcg, oral, BID  acetaminophen, 975 mg, oral, TID  allopurinol, 100 mg, oral, Daily  amitriptyline, 50 mg, oral, Nightly  ascorbic acid, 500 mg, oral, Daily  FLUoxetine, 20 mg, oral, Daily  gabapentin, 300 mg, oral, q AM  gabapentin, 600 mg, oral, Nightly  ipratropium-albuteroL, 3 mL, nebulization, q6h  lacosamide, 100 mg, oral, BID  lamoTRIgine, 100 mg, oral, Daily  magnesium oxide, 400 mg, oral, Daily  metoprolol tartrate, 50 mg, oral, BID  pantoprazole, 40 mg, oral, Daily before breakfast  rivaroxaban, 10 mg, oral, Daily  sucralfate, 1 g, oral, q6h SONIA      Continuous medications     PRN medications  PRN medications: benzocaine-menthol, loperamide, loratadine, methocarbamol, ondansetron, oxygen, SUMAtriptan     Relevant Results    Lab Results   Component Value Date    WBC 3.6 (L) 03/13/2024    HGB 11.5 (L) 03/13/2024    HCT 35.9 (L) 03/13/2024    MCV 85 03/13/2024     " 03/13/2024      Lab Results   Component Value Date    GLUCOSE 107 (H) 03/13/2024    CALCIUM 9.0 03/13/2024     03/13/2024    K 4.0 03/13/2024    CO2 27 03/13/2024     03/13/2024    BUN 13 03/13/2024    CREATININE 0.71 03/13/2024      Lab Results   Component Value Date    ALT 10 03/09/2024    AST 12 03/09/2024    ALKPHOS 90 03/09/2024    BILITOT 0.3 03/09/2024        Assessment and Plan by Problem:   Principal Problem:    Allergic reaction to drug, initial encounter    Jazmin Hein is a 49 y.o. female w/ PMHx PAH (Uptravi, Adempas, Opsimut), PPM 2019, SVT/PACs/PVCs on BB, HTN, TBI c/b seizure disorder, gastric ulcers, migraines, ELENA (on CPAP), recurrent PE on Xarelto, bipolar disorder, being admitted for headaches, vomiting, diarrhea, myalgias, jaw pain, and flushing in the setting of recently titrating up on Uptravi. Likely having orthostatic hypotension as a result of vomiting/diarrhea/poor PO intake and had 2 falls recently. She remains HDS, although BP softer, on her baseline O2 requirement of 4L. Plan to discuss with Dr. Johnson in the AM Uptravi dosing. She was given 500 ml in ED and will plan to hold home lasix for now in setting of volume depletion. Will ctm volume status and ability to tolerate PO.    As of 3/11, has O2 requirement of 5-6L NC (BL is 4). Likely d/t pulm edema will start IV diuresis and monitor.      Updates 3/13:  - Back on 5L O2.   - Reorder 1x 40 mg IV lasix, will monitor UO and RFP PM.   - TTE no acute changes since 09/2023  - Tolerating PO easier, HA worse today, will cTM.   - Decreased uptravi to 1200 micrograms BID       #PAH  #RV failure  ::TTE (9/15/23): EF 65-70%, RV pressure, overload, impaired relaxation of LV diastolic filling, moderately elevated PAP, RVSP 62  ::RHC (10/31/2023) At rest PAp: 70/35 (47); PWP: 18; CO/CI 8.1/4.3. with exercise PAp: 90/40 (57); PWP: 20; CO/CI: 11.2/6.1   ::On 4L at baseline  -Home regimen: Adempas 2.5 mg TID, Opsumit 10 mg daily,  Uptravi 1600 mcg bid - > Transitioned to 1200 micrograms per Dr. Johnson (3/13/24)  -Continue to diurese as tolerated (40 mg IV push every day, assess UO and RFP)     #Diarrhea  #Hypokalemia  ::Likely 2/2 Uptravi  -Check stool pathogen panel  -Monitor electrolytes, replete PRN in setting of diarrhea     #Nausea, vomiting 2/2 Uptravi  -Qtc 486, spot dose Zofran     #Orthostatic hypotension  ::Likely volume depleted iso emesis, diarrhea  ::s/p 2x 500 ml IVF given in ED  -Obtain orthostats in AM  -Hold home lasix for now     #HTN  -Hold Losartan 25 mg daily iso softer BP     #Gout flare?   -Continue Allopurinol 100 mg daily  -Would avoid colchicine in setting of diarrhea and would avoid NSAIDs given known ulcers  -Obtain uric acid level. Consider starting steroid if elevated     #Migraines  #Seizures  #Bipolar disorder  -C/w home Amitriptyline 50 mg at bedtime, Prozac 20 mg daily  -C/w home Vraylar 1.5 mg -need to contact psych to continue  -C/w home Vimpat 100 mg bid  -C/w home Lamotrigine 100 mg daily  -Hold Rimegepant 75 mg every other day and Sumatriptan 100 mg bid prn given not currently having migraines  -Hold monthly Emgality 1 ml injections     #Hx SVTs/PACs/PVCs/NSVT  ::Event recorder outpatient revealing PVCs, NSVT, PACs  -Follows with cardiology outpatient, arranging for stress test as outpatient to rule out ischemic dz and referred to EP  -Tele  -Metop tartrate 50 mg BID, consider holding if softer Bps  -K>4, Mg>2     #Hx gastric ulcers  -Omeprazole 40 mg daily->panto 40 mg  -Sucralfate 1g q6h      #Recurrent PEs  ::unprovoked in 2014, recurrence after stopping AC  -C/w home Xarelto 10 mg daily     #Mild ELENA  -RT c/s for nightly CPAP     #Allergies: Loratidine 10 mg daily prn  #Neuropathic pain: C/w home Gabapentin 300 mg every morning, 600 every night  #Misc: c/w home vit C     F: cautious pHTN  E: replete PRN  N: regular, 1.5L fluid restriction  A: PIV    DVT PPx: xarelto   GI PPx: PPI     CODE STATUS:  DNR/DNI (confirmed on admission)  POA: Addi () [phone]     Reinaldo Cespedes MD PhD  PGY1 Medicine

## 2024-03-13 NOTE — CARE PLAN
The patient's goals for the shift include  not having nausea after taking medications.    The clinical goals for the shift include Patient will have no SOB throughout the shift    Pt is alert and oriented with  at bedside. Pt shows no s/s of distress on 5L of O2. Pt was educated on using call light for assistance and verbalizes understanding. Bed in lowest position, and call light within reach. Will continue to monitor, assess and update pt on plan of care.

## 2024-03-14 LAB
ALBUMIN SERPL BCP-MCNC: 3.6 G/DL (ref 3.4–5)
ANION GAP SERPL CALC-SCNC: 15 MMOL/L (ref 10–20)
BASOPHILS # BLD AUTO: 0.01 X10*3/UL (ref 0–0.1)
BASOPHILS NFR BLD AUTO: 0.3 %
BUN SERPL-MCNC: 14 MG/DL (ref 6–23)
CALCIUM SERPL-MCNC: 8.1 MG/DL (ref 8.6–10.6)
CHLORIDE SERPL-SCNC: 102 MMOL/L (ref 98–107)
CO2 SERPL-SCNC: 27 MMOL/L (ref 21–32)
CREAT SERPL-MCNC: 0.68 MG/DL (ref 0.5–1.05)
EGFRCR SERPLBLD CKD-EPI 2021: >90 ML/MIN/1.73M*2
EOSINOPHIL # BLD AUTO: 0.11 X10*3/UL (ref 0–0.7)
EOSINOPHIL NFR BLD AUTO: 3.4 %
ERYTHROCYTE [DISTWIDTH] IN BLOOD BY AUTOMATED COUNT: 14.1 % (ref 11.5–14.5)
GLUCOSE SERPL-MCNC: 93 MG/DL (ref 74–99)
HCT VFR BLD AUTO: 34.2 % (ref 36–46)
HGB BLD-MCNC: 10.8 G/DL (ref 12–16)
IMM GRANULOCYTES # BLD AUTO: 0.02 X10*3/UL (ref 0–0.7)
IMM GRANULOCYTES NFR BLD AUTO: 0.6 % (ref 0–0.9)
LYMPHOCYTES # BLD AUTO: 1.06 X10*3/UL (ref 1.2–4.8)
LYMPHOCYTES NFR BLD AUTO: 32.4 %
MAGNESIUM SERPL-MCNC: 2.26 MG/DL (ref 1.6–2.4)
MCH RBC QN AUTO: 27.1 PG (ref 26–34)
MCHC RBC AUTO-ENTMCNC: 31.6 G/DL (ref 32–36)
MCV RBC AUTO: 86 FL (ref 80–100)
MONOCYTES # BLD AUTO: 0.22 X10*3/UL (ref 0.1–1)
MONOCYTES NFR BLD AUTO: 6.7 %
NEUTROPHILS # BLD AUTO: 1.85 X10*3/UL (ref 1.2–7.7)
NEUTROPHILS NFR BLD AUTO: 56.6 %
NRBC BLD-RTO: 0 /100 WBCS (ref 0–0)
PHOSPHATE SERPL-MCNC: 4.6 MG/DL (ref 2.5–4.9)
PLATELET # BLD AUTO: 256 X10*3/UL (ref 150–450)
POTASSIUM SERPL-SCNC: 4.5 MMOL/L (ref 3.5–5.3)
RBC # BLD AUTO: 3.99 X10*6/UL (ref 4–5.2)
SODIUM SERPL-SCNC: 139 MMOL/L (ref 136–145)
WBC # BLD AUTO: 3.3 X10*3/UL (ref 4.4–11.3)

## 2024-03-14 PROCEDURE — 85025 COMPLETE CBC W/AUTO DIFF WBC: CPT

## 2024-03-14 PROCEDURE — 2500000002 HC RX 250 W HCPCS SELF ADMINISTERED DRUGS (ALT 637 FOR MEDICARE OP, ALT 636 FOR OP/ED): Performed by: STUDENT IN AN ORGANIZED HEALTH CARE EDUCATION/TRAINING PROGRAM

## 2024-03-14 PROCEDURE — 94640 AIRWAY INHALATION TREATMENT: CPT

## 2024-03-14 PROCEDURE — 2500000002 HC RX 250 W HCPCS SELF ADMINISTERED DRUGS (ALT 637 FOR MEDICARE OP, ALT 636 FOR OP/ED)

## 2024-03-14 PROCEDURE — 2500000001 HC RX 250 WO HCPCS SELF ADMINISTERED DRUGS (ALT 637 FOR MEDICARE OP)

## 2024-03-14 PROCEDURE — 2500000001 HC RX 250 WO HCPCS SELF ADMINISTERED DRUGS (ALT 637 FOR MEDICARE OP): Performed by: STUDENT IN AN ORGANIZED HEALTH CARE EDUCATION/TRAINING PROGRAM

## 2024-03-14 PROCEDURE — 36415 COLL VENOUS BLD VENIPUNCTURE: CPT

## 2024-03-14 PROCEDURE — 2500000005 HC RX 250 GENERAL PHARMACY W/O HCPCS

## 2024-03-14 PROCEDURE — 1200000002 HC GENERAL ROOM WITH TELEMETRY DAILY

## 2024-03-14 PROCEDURE — 83735 ASSAY OF MAGNESIUM: CPT

## 2024-03-14 PROCEDURE — 80069 RENAL FUNCTION PANEL: CPT

## 2024-03-14 PROCEDURE — 2500000004 HC RX 250 GENERAL PHARMACY W/ HCPCS (ALT 636 FOR OP/ED)

## 2024-03-14 PROCEDURE — 99232 SBSQ HOSP IP/OBS MODERATE 35: CPT

## 2024-03-14 RX ORDER — FUROSEMIDE 40 MG/1
40 TABLET ORAL DAILY
Status: DISCONTINUED | OUTPATIENT
Start: 2024-03-14 | End: 2024-03-16

## 2024-03-14 RX ADMIN — SUCRALFATE 1 G: 1 TABLET ORAL at 23:48

## 2024-03-14 RX ADMIN — ACETAMINOPHEN 975 MG: 325 TABLET ORAL at 08:12

## 2024-03-14 RX ADMIN — AMITRIPTYLINE HYDROCHLORIDE 50 MG: 50 TABLET, FILM COATED ORAL at 21:01

## 2024-03-14 RX ADMIN — SELEXIPAG 1200 MCG: 200 TABLET, COATED ORAL at 09:00

## 2024-03-14 RX ADMIN — ONDANSETRON HYDROCHLORIDE 4 MG: 4 TABLET, FILM COATED ORAL at 11:06

## 2024-03-14 RX ADMIN — Medication 400 MG: at 08:13

## 2024-03-14 RX ADMIN — METOPROLOL TARTRATE 50 MG: 50 TABLET, FILM COATED ORAL at 21:01

## 2024-03-14 RX ADMIN — GABAPENTIN 600 MG: 300 CAPSULE ORAL at 21:01

## 2024-03-14 RX ADMIN — IPRATROPIUM BROMIDE AND ALBUTEROL SULFATE 3 ML: .5; 3 SOLUTION RESPIRATORY (INHALATION) at 14:22

## 2024-03-14 RX ADMIN — SUCRALFATE 1 G: 1 TABLET ORAL at 12:07

## 2024-03-14 RX ADMIN — METOPROLOL TARTRATE 50 MG: 50 TABLET, FILM COATED ORAL at 08:13

## 2024-03-14 RX ADMIN — PANTOPRAZOLE SODIUM 40 MG: 40 TABLET, DELAYED RELEASE ORAL at 05:55

## 2024-03-14 RX ADMIN — RIOCIGUAT 2.5 MG: 2.5 TABLET, FILM COATED ORAL at 21:05

## 2024-03-14 RX ADMIN — LACOSAMIDE 100 MG: 100 TABLET, FILM COATED ORAL at 08:13

## 2024-03-14 RX ADMIN — RIVAROXABAN 10 MG: 20 TABLET, FILM COATED ORAL at 08:13

## 2024-03-14 RX ADMIN — SUCRALFATE 1 G: 1 TABLET ORAL at 05:54

## 2024-03-14 RX ADMIN — GABAPENTIN 300 MG: 300 CAPSULE ORAL at 08:13

## 2024-03-14 RX ADMIN — SUCRALFATE 1 G: 1 TABLET ORAL at 18:32

## 2024-03-14 RX ADMIN — ALLOPURINOL 100 MG: 100 TABLET ORAL at 08:13

## 2024-03-14 RX ADMIN — ACETAMINOPHEN 975 MG: 325 TABLET ORAL at 15:48

## 2024-03-14 RX ADMIN — RIOCIGUAT 2.5 MG: 2.5 TABLET, FILM COATED ORAL at 14:20

## 2024-03-14 RX ADMIN — ONDANSETRON HYDROCHLORIDE 4 MG: 4 TABLET, FILM COATED ORAL at 18:33

## 2024-03-14 RX ADMIN — FLUOXETINE 20 MG: 10 CAPSULE ORAL at 08:12

## 2024-03-14 RX ADMIN — ACETAMINOPHEN 975 MG: 325 TABLET ORAL at 21:01

## 2024-03-14 RX ADMIN — IPRATROPIUM BROMIDE AND ALBUTEROL SULFATE 3 ML: .5; 3 SOLUTION RESPIRATORY (INHALATION) at 10:07

## 2024-03-14 RX ADMIN — MACITENTAN 10 MG: 10 TABLET, FILM COATED ORAL at 22:00

## 2024-03-14 RX ADMIN — FUROSEMIDE 40 MG: 40 TABLET ORAL at 11:06

## 2024-03-14 RX ADMIN — ONDANSETRON HYDROCHLORIDE 4 MG: 4 TABLET, FILM COATED ORAL at 05:54

## 2024-03-14 RX ADMIN — SELEXIPAG 800 MCG: 800 TABLET, COATED ORAL at 21:04

## 2024-03-14 RX ADMIN — OXYCODONE HYDROCHLORIDE AND ACETAMINOPHEN 500 MG: 500 TABLET ORAL at 08:13

## 2024-03-14 RX ADMIN — LAMOTRIGINE 100 MG: 100 TABLET ORAL at 08:13

## 2024-03-14 RX ADMIN — LACOSAMIDE 100 MG: 100 TABLET, FILM COATED ORAL at 21:01

## 2024-03-14 RX ADMIN — Medication 1 LOZENGE: at 12:10

## 2024-03-14 RX ADMIN — RIOCIGUAT 2.5 MG: 2.5 TABLET, FILM COATED ORAL at 08:11

## 2024-03-14 RX ADMIN — IPRATROPIUM BROMIDE AND ALBUTEROL SULFATE 3 ML: .5; 3 SOLUTION RESPIRATORY (INHALATION) at 21:40

## 2024-03-14 RX ADMIN — Medication 1 LOZENGE: at 15:48

## 2024-03-14 ASSESSMENT — COGNITIVE AND FUNCTIONAL STATUS - GENERAL
WALKING IN HOSPITAL ROOM: A LITTLE
MOVING TO AND FROM BED TO CHAIR: A LITTLE
MOBILITY SCORE: 19
DRESSING REGULAR UPPER BODY CLOTHING: A LITTLE
TURNING FROM BACK TO SIDE WHILE IN FLAT BAD: A LITTLE
DRESSING REGULAR LOWER BODY CLOTHING: A LITTLE
MOBILITY SCORE: 18
STANDING UP FROM CHAIR USING ARMS: A LITTLE
STANDING UP FROM CHAIR USING ARMS: A LITTLE
TOILETING: A LITTLE
STANDING UP FROM CHAIR USING ARMS: A LITTLE
DAILY ACTIVITIY SCORE: 18
CLIMB 3 TO 5 STEPS WITH RAILING: A LOT
STANDING UP FROM CHAIR USING ARMS: A LITTLE
HELP NEEDED FOR BATHING: A LITTLE
PERSONAL GROOMING: A LITTLE
CLIMB 3 TO 5 STEPS WITH RAILING: A LOT
CLIMB 3 TO 5 STEPS WITH RAILING: A LOT
HELP NEEDED FOR BATHING: A LITTLE
EATING MEALS: A LITTLE
MOVING FROM LYING ON BACK TO SITTING ON SIDE OF FLAT BED WITH BEDRAILS: A LITTLE
DAILY ACTIVITIY SCORE: 20
DRESSING REGULAR UPPER BODY CLOTHING: A LITTLE
DRESSING REGULAR UPPER BODY CLOTHING: A LITTLE
WALKING IN HOSPITAL ROOM: A LITTLE
DRESSING REGULAR LOWER BODY CLOTHING: A LITTLE
MOVING TO AND FROM BED TO CHAIR: A LITTLE
TOILETING: A LITTLE
CLIMB 3 TO 5 STEPS WITH RAILING: A LITTLE
DRESSING REGULAR UPPER BODY CLOTHING: A LITTLE
DAILY ACTIVITIY SCORE: 20
HELP NEEDED FOR BATHING: A LITTLE
WALKING IN HOSPITAL ROOM: A LITTLE
MOVING TO AND FROM BED TO CHAIR: A LITTLE
MOBILITY SCORE: 19
WALKING IN HOSPITAL ROOM: A LITTLE
MOVING TO AND FROM BED TO CHAIR: A LITTLE
DRESSING REGULAR LOWER BODY CLOTHING: A LITTLE
TOILETING: A LITTLE
MOBILITY SCORE: 19
HELP NEEDED FOR BATHING: A LITTLE
DRESSING REGULAR LOWER BODY CLOTHING: A LITTLE
DAILY ACTIVITIY SCORE: 20
TOILETING: A LITTLE

## 2024-03-14 ASSESSMENT — PAIN SCALES - GENERAL
PAINLEVEL_OUTOF10: 0 - NO PAIN
PAINLEVEL_OUTOF10: 5 - MODERATE PAIN
PAINLEVEL_OUTOF10: 3

## 2024-03-14 ASSESSMENT — PAIN - FUNCTIONAL ASSESSMENT
PAIN_FUNCTIONAL_ASSESSMENT: 0-10

## 2024-03-14 ASSESSMENT — PAIN DESCRIPTION - LOCATION
LOCATION: GENERALIZED
LOCATION: BACK

## 2024-03-14 NOTE — PROGRESS NOTES
"Nutrition Follow Up Assessment  Nutrition Assessment         Patient is a 49 y.o. female on day 7 of admission for Allergic reaction to drug, initial encounter.       Nutrition History:  Energy Intake: Fair 50-75 %  Food and Nutrient History: Pt reports in the past week her appetite has increased. States she is consuming 3 meals per day and ~75% of meals. Pt states she had emesis after dinner last night. Reports she felt fine for breakfast and lunch but then became nauceous after eating dinner. States she would like to try Ensure/Boost supplements. Pt reports she is interested in changing her diet to be more healthy. Pt was interested in learning more about plant based diet. Pt expressed interest in following with outpatient dietitian.    Anthropometrics:  Height: 162.6 cm (5' 4\")   Weight: 107 kg (236 lb 6.4 oz)   BMI (Calculated): 40.56  IBW/kg (Dietitian Calculated): 54.5 kg  Percent of IBW: 196 %       Admission Weight Trend:  Date/Time Weight   03/14/24 0617 107 kg (236 lb 6.4 oz)   03/13/24 1003 108 kg (238 lb 3.2 oz)   03/08/24 10:39:26 107 kg (236 lb 8.9 oz)   03/07/24 1853 104 kg (230 lb)     Weight Change %:  Weight History / % Weight Change: Pt reports he wt flutuates by 10lb reguarly. Pt states her wt flutuates d/t fluid changes. Pts wt is up 2.9% significantly since admission.  Significant Weight Gain: Fluid related    Nutrition Focused Physical Exam Findings:  defer: completed on initial assessment 3/8.  Edema:  Edema:  (Generalized non-pitting, LLE +1, RLE +1)  Physical Findings:  Skin: Negative    Nutrition Significant Labs:  CBC Trend:   Results from last 7 days   Lab Units 03/14/24  0608 03/13/24  0624 03/12/24  0853 03/11/24  1004   WBC AUTO x10*3/uL 3.3* 3.6* 4.9 4.3*   RBC AUTO x10*6/uL 3.99* 4.23 4.59 3.48*   HEMOGLOBIN g/dL 10.8* 11.5* 11.9* 9.4*   HEMATOCRIT % 34.2* 35.9* 40.1 28.3*   MCV fL 86 85 87 81   PLATELETS AUTO x10*3/uL 256 255 234 198    , BMP Trend:   Results from last 7 days   Lab " Units 03/14/24  0608 03/13/24  1619 03/13/24  0624 03/12/24  1717   GLUCOSE mg/dL 93 93 107* 74   CALCIUM mg/dL 8.1* 8.6 9.0 8.4*   SODIUM mmol/L 139 140 140 139   POTASSIUM mmol/L 4.5 4.4 4.0 3.7   CO2 mmol/L 27 32 27 31   CHLORIDE mmol/L 102 102 102 98   BUN mg/dL 14 15 13 10   CREATININE mg/dL 0.68 0.84 0.71 0.70   Renal Lab Trend:   Results from last 7 days   Lab Units 03/14/24  0608 03/13/24  1619 03/13/24  0624 03/12/24  1717   POTASSIUM mmol/L 4.5 4.4 4.0 3.7   PHOSPHORUS mg/dL 4.6 3.3 4.6 4.3   SODIUM mmol/L 139 140 140 139   MAGNESIUM mg/dL 2.26 1.96 2.19 2.09   EGFR mL/min/1.73m*2 >90 85 >90 >90   BUN mg/dL 14 15 13 10   CREATININE mg/dL 0.68 0.84 0.71 0.70        Nutrition Specific Medications:  Scheduled medications  amitriptyline, 50 mg, oral, Nightly  ascorbic acid, 500 mg, oral, Daily  furosemide, 40 mg, oral, Daily  ipratropium-albuteroL, 3 mL, nebulization, TID  lacosamide, 100 mg, oral, BID  magnesium oxide, 400 mg, oral, Daily  pantoprazole, 40 mg, oral, Daily before breakfast  sucralfate, 1 g, oral, q6h SONIA      I/O:   Last BM Date: 03/13/24; Stool Appearance: Soft, Loose (03/14/24 1009)    Dietary Orders (From admission, onward)       Start     Ordered    03/09/24 1124  Adult diet Regular  Diet effective now        Question:  Diet type  Answer:  Regular    03/09/24 1124                     Estimated Needs:   Total Energy Estimated Needs (kCal): 2013 kCal  Method for Estimating Needs: MSJ (0658) x 1.2  Total Protein Estimated Needs (g): 82 g  Method for Estimating Needs: 1.5g/kg IBW +  Total Fluid Estimated Needs (mL):  (per MD/ team)         Nutrition Diagnosis     Nutrition Diagnosis  Patient has Nutrition Diagnosis: Yes  Diagnosis Status (1): New  Nutrition Diagnosis 1: Inadequate oral intake  Related to (1): inability to consume sufficient energy  As Evidenced by (1): N/V/D       Nutrition Interventions/Recommendations         Nutrition Prescription:  Ensure Plus once daily (350kcal, 13g  protein).  Continue regular diet to promote PO intake.  Pt would like to lose wt and expressed interest in following with outpatient dietitian. Please put referral in outpatient EMR for RDN visit/clinical nutrition services. Add scheduling number ( [phone] ) to pt's discharge summary.        Nutrition Interventions:   Food and/or Nutrient Delivery Interventions  Interventions: Meals and snacks, Medical food supplement  Goal: consume >50% of meals  Medical Food Supplement: Commercial beverage  Goal: consume Ensure Plus QD       Nutrition Education:   Provided pt with handout from [facility]: General, Healthful Mediterranean Nutrition Therapy. Educated on adding more fruits, vegetables, whole grains, and lean meats to diet. Encouraged pt to make meals at home. Educated that most restaurants when eating out have high salt, high fat, high calorie foods. Pt reports she does not add salt to food and uses Mrs. Torres for seasoning. Reports her  is the one who cooks for them.       Nutrition Monitoring and Evaluation   Food/Nutrient Related History Monitoring  Monitoring and Evaluation Plan: Energy intake  Criteria: meet >75% of estimated energy needs    Body Composition/Growth/Weight History  Monitoring and Evaluation Plan: Weight  Criteria: stable weight    Biochemical Data, Medical Tests and Procedures  Monitoring and Evaluation Plan: Electrolyte/renal panel, Glucose/endocrine profile  Criteria: lytes WNL, BG WNL    Nutrition Focused Physical Findings  Monitoring and Evaluation Plan: Digestive System  Criteria: GI function       Time Spent/Follow-up Reminder:   Time Spent (min): 45 minutes  Last Date of Nutrition Visit: 03/14/24  Nutrition Follow-Up Needed?: Dietitian to reassess per policy

## 2024-03-14 NOTE — PROGRESS NOTES
"  Subjective     No acute events overnight.  She is concerned about emesis after dinner night prior. Resolved w/ zofran push. Otherwise feels subjectively better and feels better ambulation.     Objective   /54 (BP Location: Right arm, Patient Position: Lying)   Pulse 84   Temp 37 °C (98.6 °F) (Temporal)   Resp 18   Ht 1.626 m (5' 4\")   Wt 107 kg (236 lb 6.4 oz)   SpO2 95%   BMI 40.58 kg/m²       Intake/Output last 3 Shifts:    Intake/Output Summary (Last 24 hours) at 3/14/2024 1849  Last data filed at 3/14/2024 1835  Gross per 24 hour   Intake 480 ml   Output 2110 ml   Net -1630 ml        Physical Exam:  General appearance/Constitutional: No acute distress  Eyes: Sclera anicteric  Head & Neck: Moist mucous membranes  Respiratory/Chest Wall: Clear breath sounds  Cardiovascular: Regular rate and rhythm  Gastrointestinal: Soft, nontender, nondistended  Neurologic: Awake, alert, oriented to person place and time  Extremities: No lower extremity edema  Psychiatric: Appropriate mood  Lines/Drains: pIV    Scheduled medications  [Held by provider] cariprazine, 1.5 mg, oral, Daily  riociguat, 2.5 mg, oral, TID  macitentan, 10 mg, oral, Daily  acetaminophen, 975 mg, oral, TID  allopurinol, 100 mg, oral, Daily  amitriptyline, 50 mg, oral, Nightly  ascorbic acid, 500 mg, oral, Daily  FLUoxetine, 20 mg, oral, Daily  furosemide, 40 mg, oral, Daily  gabapentin, 300 mg, oral, q AM  gabapentin, 600 mg, oral, Nightly  ipratropium-albuteroL, 3 mL, nebulization, TID  lacosamide, 100 mg, oral, BID  lamoTRIgine, 100 mg, oral, Daily  magnesium oxide, 400 mg, oral, Daily  metoprolol tartrate, 50 mg, oral, BID  pantoprazole, 40 mg, oral, Daily before breakfast  rivaroxaban, 10 mg, oral, Daily  selexipag, 800 mcg, oral, BID  sucralfate, 1 g, oral, q6h SONIA      Continuous medications     PRN medications  PRN medications: benzocaine-menthol, loperamide, loratadine, methocarbamol, ondansetron, oxygen, SUMAtriptan     Relevant " Results    Lab Results   Component Value Date    WBC 3.3 (L) 03/14/2024    HGB 10.8 (L) 03/14/2024    HCT 34.2 (L) 03/14/2024    MCV 86 03/14/2024     03/14/2024      Lab Results   Component Value Date    GLUCOSE 93 03/14/2024    CALCIUM 8.1 (L) 03/14/2024     03/14/2024    K 4.5 03/14/2024    CO2 27 03/14/2024     03/14/2024    BUN 14 03/14/2024    CREATININE 0.68 03/14/2024      Lab Results   Component Value Date    ALT 10 03/09/2024    AST 12 03/09/2024    ALKPHOS 90 03/09/2024    BILITOT 0.3 03/09/2024        Assessment and Plan by Problem:   Principal Problem:    Allergic reaction to drug, initial encounter    Jazmin Hein is a 49 y.o. female w/ PMHx PAH (Uptravi, Adempas, Opsimut), PPM 2019, SVT/PACs/PVCs on BB, HTN, TBI c/b seizure disorder, gastric ulcers, migraines, ELENA (on CPAP), recurrent PE on Xarelto, bipolar disorder, being admitted for headaches, vomiting, diarrhea, myalgias, jaw pain, and flushing in the setting of recently titrating up on Uptravi. Likely having orthostatic hypotension as a result of vomiting/diarrhea/poor PO intake and had 2 falls recently. She remains HDS, although BP softer, on her baseline O2 requirement of 4L. Plan to discuss with Dr. Johnson in the AM Uptravi dosing. She was given 500 ml in ED and will plan to hold home lasix for now in setting of volume depletion. Will ctm volume status and ability to tolerate PO.    As of 3/14, has O2 requirement at baseline and able to ambulate.      Updates 3/14:  - Back on 5L O2, baseline o2 requirement  - Reorder 1x 40 mg IV lasix, will monitor UO and RFP PM.   - TTE no acute changes since 09/2023  - Tolerating PO easier, HA worse today, will cTM.   - Decreased uptravi to 800 micrograms BID       #PAH  #RV failure  ::TTE (9/15/23): EF 65-70%, RV pressure, overload, impaired relaxation of LV diastolic filling, moderately elevated PAP, RVSP 62  ::RHC (10/31/2023) At rest PAp: 70/35 (47); PWP: 18; CO/CI 8.1/4.3. with  exercise PAp: 90/40 (57); PWP: 20; CO/CI: 11.2/6.1   ::On 4L at baseline  -Home regimen: Adempas 2.5 mg TID, Opsumit 10 mg daily, Uptravi 1600 mcg bid - > Transitioned to 800 micrograms per Dr. Johnson (3/14/24)  -Continue to diurese as tolerated (40 mg IV push every day, assess UO and RFP)     #Diarrhea  #Hypokalemia  ::Likely 2/2 Uptravi  -Check stool pathogen panel  -Monitor electrolytes, replete PRN in setting of diarrhea     #Nausea, vomiting 2/2 Uptravi  -Qtc 486, spot dose Zofran     #Orthostatic hypotension  ::Likely volume depleted iso emesis, diarrhea  ::s/p 2x 500 ml IVF given in ED  -Obtain orthostats in AM  -Hold home lasix for now     #HTN  -Hold Losartan 25 mg daily iso softer BP     #Gout flare?   -Continue Allopurinol 100 mg daily  -Would avoid colchicine in setting of diarrhea and would avoid NSAIDs given known ulcers  -Obtain uric acid level. Consider starting steroid if elevated     #Migraines  #Seizures  #Bipolar disorder  -C/w home Amitriptyline 50 mg at bedtime, Prozac 20 mg daily  -C/w home Vraylar 1.5 mg -need to contact psych to continue  -C/w home Vimpat 100 mg bid  -C/w home Lamotrigine 100 mg daily  -Hold Rimegepant 75 mg every other day and Sumatriptan 100 mg bid prn given not currently having migraines  -Hold monthly Emgality 1 ml injections     #Hx SVTs/PACs/PVCs/NSVT  ::Event recorder outpatient revealing PVCs, NSVT, PACs  -Follows with cardiology outpatient, arranging for stress test as outpatient to rule out ischemic dz and referred to EP  -Tele  -Metop tartrate 50 mg BID, consider holding if softer Bps  -K>4, Mg>2     #Hx gastric ulcers  -Omeprazole 40 mg daily->panto 40 mg  -Sucralfate 1g q6h      #Recurrent PEs  ::unprovoked in 2014, recurrence after stopping AC  -C/w home Xarelto 10 mg daily     #Mild ELENA  -RT c/s for nightly CPAP     #Allergies: Loratidine 10 mg daily prn  #Neuropathic pain: C/w home Gabapentin 300 mg every morning, 600 every night  #Misc: c/w home vit C     F:  cautious pHTN  E: replete PRN  N: regular, 1.5L fluid restriction  A: PIV    DVT PPx: xarelto   GI PPx: PPI     CODE STATUS: DNR/DNI (confirmed on admission)  POA: Addi () [phone]     Reinaldo Cespedes MD PhD  PGY1 Medicine

## 2024-03-14 NOTE — CARE PLAN
Problem: Respiratory  Goal: Minimal/no exertional discomfort or dyspnea this shift  Outcome: Progressing     Problem: Chronic Conditions and Co-morbidities  Goal: Patient's chronic conditions and co-morbidity symptoms are monitored and maintained or improved  Outcome: Progressing     Problem: Nutrition  Goal: Reduce weight from edema/fluid  Outcome: Progressing       The clinical goals for the shift include Patient will rate pain <4 by end of shift.

## 2024-03-14 NOTE — SIGNIFICANT EVENT
RAPID RESPONSE RN NOTE- RADAR 6   03/14/24 1424   Onset Documentation   Rapid Response Initiated By Radar auto page   [place] [facility]  [id number]   Pager Time 1424   Arrival Time 1425   Event End Time 1535   Level II Called No   Primary Reason for Call Radar auto page  (RADAR 6)     VS: 36.0, 81, 17, 100/52, 95%.  Patient sitting up in bed, receiving scheduled breathing treatment.  Discussed VS with SANDY Hernandez - no concerns from Nursing at this time.    Nursing to contact Rapid Response Team with any further issues or patient deterioration.

## 2024-03-14 NOTE — PROGRESS NOTES
3/14/24 4355 Transitional Care Coordinator Notes:    Per team, patient will stay one more day. Plan is to diurese another day. Will continue to follow for discharge updates.                    Assessment/Plan   Principal Problem:    Allergic reaction to drug, initial encounter    Discharge Plans: discharge to home           Paola Nash RN

## 2024-03-14 NOTE — SIGNIFICANT EVENT
Rapid Response RN responding for RADAR score of 6 due to the following VS: T 35.8 °Celsius; HR 71 ; RR 17; /61; SPO2 91% .      Reviewed abnormal VS with bedside RN who had the division aide recheck the O2 sat and found it's  improvement (94%)  Otherwise, same nurse expressed no concerns regarding the patient's current condition based upon the remaining VS.  No interventions by Rapid Response team indicated at this time.

## 2024-03-15 LAB
ALBUMIN SERPL BCP-MCNC: 3.5 G/DL (ref 3.4–5)
ANION GAP SERPL CALC-SCNC: 17 MMOL/L (ref 10–20)
BASOPHILS # BLD AUTO: 0.01 X10*3/UL (ref 0–0.1)
BASOPHILS NFR BLD AUTO: 0.3 %
BUN SERPL-MCNC: 19 MG/DL (ref 6–23)
CALCIUM SERPL-MCNC: 8 MG/DL (ref 8.6–10.6)
CHLORIDE SERPL-SCNC: 100 MMOL/L (ref 98–107)
CO2 SERPL-SCNC: 22 MMOL/L (ref 21–32)
CREAT SERPL-MCNC: 0.81 MG/DL (ref 0.5–1.05)
EGFRCR SERPLBLD CKD-EPI 2021: 89 ML/MIN/1.73M*2
EOSINOPHIL # BLD AUTO: 0.08 X10*3/UL (ref 0–0.7)
EOSINOPHIL NFR BLD AUTO: 2.2 %
ERYTHROCYTE [DISTWIDTH] IN BLOOD BY AUTOMATED COUNT: 13.9 % (ref 11.5–14.5)
GLUCOSE SERPL-MCNC: 176 MG/DL (ref 74–99)
HCT VFR BLD AUTO: 30 % (ref 36–46)
HGB BLD-MCNC: 10 G/DL (ref 12–16)
IMM GRANULOCYTES # BLD AUTO: 0.01 X10*3/UL (ref 0–0.7)
IMM GRANULOCYTES NFR BLD AUTO: 0.3 % (ref 0–0.9)
LYMPHOCYTES # BLD AUTO: 1.05 X10*3/UL (ref 1.2–4.8)
LYMPHOCYTES NFR BLD AUTO: 29.4 %
MAGNESIUM SERPL-MCNC: 1.94 MG/DL (ref 1.6–2.4)
MCH RBC QN AUTO: 27 PG (ref 26–34)
MCHC RBC AUTO-ENTMCNC: 33.3 G/DL (ref 32–36)
MCV RBC AUTO: 81 FL (ref 80–100)
MONOCYTES # BLD AUTO: 0.24 X10*3/UL (ref 0.1–1)
MONOCYTES NFR BLD AUTO: 6.7 %
NEUTROPHILS # BLD AUTO: 2.18 X10*3/UL (ref 1.2–7.7)
NEUTROPHILS NFR BLD AUTO: 61.1 %
NRBC BLD-RTO: 0 /100 WBCS (ref 0–0)
PHOSPHATE SERPL-MCNC: 3.2 MG/DL (ref 2.5–4.9)
PLATELET # BLD AUTO: 221 X10*3/UL (ref 150–450)
POTASSIUM SERPL-SCNC: 3.4 MMOL/L (ref 3.5–5.3)
RBC # BLD AUTO: 3.71 X10*6/UL (ref 4–5.2)
SODIUM SERPL-SCNC: 136 MMOL/L (ref 136–145)
WBC # BLD AUTO: 3.6 X10*3/UL (ref 4.4–11.3)

## 2024-03-15 PROCEDURE — 94640 AIRWAY INHALATION TREATMENT: CPT

## 2024-03-15 PROCEDURE — 2500000001 HC RX 250 WO HCPCS SELF ADMINISTERED DRUGS (ALT 637 FOR MEDICARE OP)

## 2024-03-15 PROCEDURE — 80069 RENAL FUNCTION PANEL: CPT

## 2024-03-15 PROCEDURE — 2500000004 HC RX 250 GENERAL PHARMACY W/ HCPCS (ALT 636 FOR OP/ED)

## 2024-03-15 PROCEDURE — 36415 COLL VENOUS BLD VENIPUNCTURE: CPT

## 2024-03-15 PROCEDURE — 2500000002 HC RX 250 W HCPCS SELF ADMINISTERED DRUGS (ALT 637 FOR MEDICARE OP, ALT 636 FOR OP/ED)

## 2024-03-15 PROCEDURE — 85025 COMPLETE CBC W/AUTO DIFF WBC: CPT

## 2024-03-15 PROCEDURE — 2500000002 HC RX 250 W HCPCS SELF ADMINISTERED DRUGS (ALT 637 FOR MEDICARE OP, ALT 636 FOR OP/ED): Performed by: STUDENT IN AN ORGANIZED HEALTH CARE EDUCATION/TRAINING PROGRAM

## 2024-03-15 PROCEDURE — 99232 SBSQ HOSP IP/OBS MODERATE 35: CPT

## 2024-03-15 PROCEDURE — 2500000005 HC RX 250 GENERAL PHARMACY W/O HCPCS

## 2024-03-15 PROCEDURE — 2500000001 HC RX 250 WO HCPCS SELF ADMINISTERED DRUGS (ALT 637 FOR MEDICARE OP): Performed by: STUDENT IN AN ORGANIZED HEALTH CARE EDUCATION/TRAINING PROGRAM

## 2024-03-15 PROCEDURE — 83735 ASSAY OF MAGNESIUM: CPT

## 2024-03-15 PROCEDURE — 1200000002 HC GENERAL ROOM WITH TELEMETRY DAILY

## 2024-03-15 RX ORDER — FUROSEMIDE 10 MG/ML
40 INJECTION INTRAMUSCULAR; INTRAVENOUS ONCE
Status: COMPLETED | OUTPATIENT
Start: 2024-03-15 | End: 2024-03-15

## 2024-03-15 RX ORDER — POTASSIUM CHLORIDE 1.5 G/1.58G
20 POWDER, FOR SOLUTION ORAL 2 TIMES DAILY
Status: COMPLETED | OUTPATIENT
Start: 2024-03-15 | End: 2024-03-16

## 2024-03-15 RX ADMIN — SUCRALFATE 1 G: 1 TABLET ORAL at 13:34

## 2024-03-15 RX ADMIN — SELEXIPAG 800 MCG: 800 TABLET, COATED ORAL at 21:46

## 2024-03-15 RX ADMIN — RIOCIGUAT 2.5 MG: 2.5 TABLET, FILM COATED ORAL at 08:32

## 2024-03-15 RX ADMIN — ACETAMINOPHEN 975 MG: 325 TABLET ORAL at 21:45

## 2024-03-15 RX ADMIN — IPRATROPIUM BROMIDE AND ALBUTEROL SULFATE 3 ML: .5; 3 SOLUTION RESPIRATORY (INHALATION) at 14:48

## 2024-03-15 RX ADMIN — SELEXIPAG 800 MCG: 800 TABLET, COATED ORAL at 08:34

## 2024-03-15 RX ADMIN — Medication 1 LOZENGE: at 13:35

## 2024-03-15 RX ADMIN — Medication 400 MG: at 08:32

## 2024-03-15 RX ADMIN — MACITENTAN 10 MG: 10 TABLET, FILM COATED ORAL at 21:45

## 2024-03-15 RX ADMIN — RIVAROXABAN 10 MG: 20 TABLET, FILM COATED ORAL at 08:31

## 2024-03-15 RX ADMIN — LACOSAMIDE 100 MG: 100 TABLET, FILM COATED ORAL at 08:31

## 2024-03-15 RX ADMIN — AMITRIPTYLINE HYDROCHLORIDE 50 MG: 50 TABLET, FILM COATED ORAL at 21:54

## 2024-03-15 RX ADMIN — METOPROLOL TARTRATE 50 MG: 50 TABLET, FILM COATED ORAL at 08:31

## 2024-03-15 RX ADMIN — GABAPENTIN 600 MG: 300 CAPSULE ORAL at 21:45

## 2024-03-15 RX ADMIN — SUCRALFATE 1 G: 1 TABLET ORAL at 19:15

## 2024-03-15 RX ADMIN — ONDANSETRON HYDROCHLORIDE 4 MG: 4 TABLET, FILM COATED ORAL at 19:15

## 2024-03-15 RX ADMIN — ALLOPURINOL 100 MG: 100 TABLET ORAL at 08:31

## 2024-03-15 RX ADMIN — POTASSIUM CHLORIDE 20 MEQ: 1.5 POWDER, FOR SOLUTION ORAL at 21:44

## 2024-03-15 RX ADMIN — FUROSEMIDE 40 MG: 40 TABLET ORAL at 08:31

## 2024-03-15 RX ADMIN — GABAPENTIN 300 MG: 300 CAPSULE ORAL at 08:32

## 2024-03-15 RX ADMIN — IPRATROPIUM BROMIDE AND ALBUTEROL SULFATE 3 ML: .5; 3 SOLUTION RESPIRATORY (INHALATION) at 08:31

## 2024-03-15 RX ADMIN — ONDANSETRON HYDROCHLORIDE 4 MG: 4 TABLET, FILM COATED ORAL at 13:34

## 2024-03-15 RX ADMIN — ACETAMINOPHEN 975 MG: 325 TABLET ORAL at 14:48

## 2024-03-15 RX ADMIN — IPRATROPIUM BROMIDE AND ALBUTEROL SULFATE 3 ML: .5; 3 SOLUTION RESPIRATORY (INHALATION) at 19:58

## 2024-03-15 RX ADMIN — SUCRALFATE 1 G: 1 TABLET ORAL at 06:03

## 2024-03-15 RX ADMIN — RIOCIGUAT 2.5 MG: 2.5 TABLET, FILM COATED ORAL at 21:45

## 2024-03-15 RX ADMIN — FUROSEMIDE 40 MG: 10 INJECTION, SOLUTION INTRAVENOUS at 16:19

## 2024-03-15 RX ADMIN — LAMOTRIGINE 100 MG: 100 TABLET ORAL at 08:30

## 2024-03-15 RX ADMIN — RIOCIGUAT 2.5 MG: 2.5 TABLET, FILM COATED ORAL at 14:49

## 2024-03-15 RX ADMIN — PANTOPRAZOLE SODIUM 40 MG: 40 TABLET, DELAYED RELEASE ORAL at 06:03

## 2024-03-15 RX ADMIN — FLUOXETINE 20 MG: 10 CAPSULE ORAL at 08:30

## 2024-03-15 RX ADMIN — OXYCODONE HYDROCHLORIDE AND ACETAMINOPHEN 500 MG: 500 TABLET ORAL at 08:32

## 2024-03-15 RX ADMIN — ACETAMINOPHEN 975 MG: 325 TABLET ORAL at 08:32

## 2024-03-15 RX ADMIN — LACOSAMIDE 100 MG: 100 TABLET, FILM COATED ORAL at 21:44

## 2024-03-15 ASSESSMENT — COGNITIVE AND FUNCTIONAL STATUS - GENERAL
WALKING IN HOSPITAL ROOM: A LITTLE
TOILETING: A LITTLE
EATING MEALS: A LITTLE
DRESSING REGULAR UPPER BODY CLOTHING: A LITTLE
MOVING TO AND FROM BED TO CHAIR: A LITTLE
TURNING FROM BACK TO SIDE WHILE IN FLAT BAD: A LITTLE
DAILY ACTIVITIY SCORE: 18
CLIMB 3 TO 5 STEPS WITH RAILING: A LITTLE
MOVING FROM LYING ON BACK TO SITTING ON SIDE OF FLAT BED WITH BEDRAILS: A LITTLE
HELP NEEDED FOR BATHING: A LITTLE
DRESSING REGULAR LOWER BODY CLOTHING: A LITTLE
STANDING UP FROM CHAIR USING ARMS: A LITTLE
PERSONAL GROOMING: A LITTLE
MOBILITY SCORE: 18

## 2024-03-15 ASSESSMENT — PAIN DESCRIPTION - LOCATION: LOCATION: GENERALIZED

## 2024-03-15 ASSESSMENT — PAIN SCALES - GENERAL: PAINLEVEL_OUTOF10: 3

## 2024-03-15 ASSESSMENT — PAIN - FUNCTIONAL ASSESSMENT: PAIN_FUNCTIONAL_ASSESSMENT: 0-10

## 2024-03-15 NOTE — CARE PLAN
The patient's goals for the shift include  getting the fluid off of her lungs and not having an emesis    The clinical goals for the shift include Pt will have pain rate below 4 by end of shift    Pt is alert and oriented at bedside. Pt stated she had an emesis after dinner today. Pt shows no s/s of distress on 5L NC. Bed in lowest position, and call light within reach. Pt educated on calling for assistance and verbalizes understanding. Will continue to monitor, assess, and update pt on plan of care.

## 2024-03-15 NOTE — PROGRESS NOTES
3/15/24 7751 Transitional Care Coordinator Notes:    Patient will possibly discharge to home tomorrow. Patient was set up with [facility] while in [place] and she was in the process of having her account transferred to [place]. Sent referral to [facility]. Patient will need a nebulizer machine and higher concentrator. Will send scripts to [facility].                 Assessment/Plan   Principal Problem:    Allergic reaction to drug, initial encounter    Discharge Plans: discharge to home           Paola Nash RN

## 2024-03-15 NOTE — PROGRESS NOTES
"  Subjective   No acute events overnight.  She is concerned about emesis after dinner night prior. Resolved w/ zofran push. Otherwise feels subjectively better and feels better ambulation.     Objective   /54 (BP Location: Right arm, Patient Position: Lying)   Pulse 77   Temp 37.1 °C (98.8 °F) (Temporal)   Resp 17   Ht 1.626 m (5' 4\")   Wt 107 kg (236 lb 6.4 oz)   SpO2 94%   BMI 40.58 kg/m²       Intake/Output last 3 Shifts:    Intake/Output Summary (Last 24 hours) at 3/15/2024 1614  Last data filed at 3/15/2024 1519  Gross per 24 hour   Intake 480 ml   Output 2975 ml   Net -2495 ml        Physical Exam:  General appearance/Constitutional: No acute distress  Eyes: Sclera anicteric  Head & Neck: Moist mucous membranes  Respiratory/Chest Wall: Clear breath sounds  Cardiovascular: Regular rate and rhythm  Gastrointestinal: Soft, nontender, nondistended  Neurologic: Awake, alert, oriented to person place and time  Extremities: No lower extremity edema  Psychiatric: Appropriate mood  Lines/Drains: pIV    Scheduled medications  [Held by provider] cariprazine, 1.5 mg, oral, Daily  riociguat, 2.5 mg, oral, TID  macitentan, 10 mg, oral, Daily  selexipag, 800 mcg, oral, BID  acetaminophen, 975 mg, oral, TID  allopurinol, 100 mg, oral, Daily  amitriptyline, 50 mg, oral, Nightly  ascorbic acid, 500 mg, oral, Daily  FLUoxetine, 20 mg, oral, Daily  furosemide, 40 mg, intravenous, Once  furosemide, 40 mg, oral, Daily  gabapentin, 300 mg, oral, q AM  gabapentin, 600 mg, oral, Nightly  ipratropium-albuteroL, 3 mL, nebulization, TID  lacosamide, 100 mg, oral, BID  lamoTRIgine, 100 mg, oral, Daily  magnesium oxide, 400 mg, oral, Daily  metoprolol tartrate, 50 mg, oral, BID  pantoprazole, 40 mg, oral, Daily before breakfast  rivaroxaban, 10 mg, oral, Daily  sucralfate, 1 g, oral, q6h SONIA      Continuous medications     PRN medications  PRN medications: benzocaine-menthol, loperamide, loratadine, methocarbamol, ondansetron, " oxygen, SUMAtriptan     Relevant Results    Lab Results   Component Value Date    WBC 3.6 (L) 03/15/2024    HGB 10.0 (L) 03/15/2024    HCT 30.0 (L) 03/15/2024    MCV 81 03/15/2024     03/15/2024      Lab Results   Component Value Date    GLUCOSE 176 (H) 03/15/2024    CALCIUM 8.0 (L) 03/15/2024     03/15/2024    K 3.4 (L) 03/15/2024    CO2 22 03/15/2024     03/15/2024    BUN 19 03/15/2024    CREATININE 0.81 03/15/2024      Lab Results   Component Value Date    ALT 10 03/09/2024    AST 12 03/09/2024    ALKPHOS 90 03/09/2024    BILITOT 0.3 03/09/2024        Assessment and Plan by Problem:   Principal Problem:    Allergic reaction to drug, initial encounter    Jazmin Hein is a 49 y.o. female w/ PMHx PAH (Uptravi, Adempas, Opsimut), PPM 2019, SVT/PACs/PVCs on BB, HTN, TBI c/b seizure disorder, gastric ulcers, migraines, ELENA (on CPAP), recurrent PE on Xarelto, bipolar disorder, being admitted for headaches, vomiting, diarrhea, myalgias, jaw pain, and flushing in the setting of recently titrating up on Uptravi. Likely having orthostatic hypotension as a result of vomiting/diarrhea/poor PO intake and had 2 falls recently. She remains HDS, although BP softer, on her baseline O2 requirement of 4L. Plan to discuss with Dr. Johnson in the AM Uptravi dosing. She was given 500 ml in ED and will plan to hold home lasix for now in setting of volume depletion. Will ctm volume status and ability to tolerate PO.    As of 3/14, has O2 requirement at baseline and able to ambulate.      Updates 3/15:  - Reorder 1x 40 mg IV lasix, will monitor UO and RFP PM.   - TTE no acute changes since 09/2023  - Tolerating PO easier and HA improcing  - c/w uptravi to 800 micrograms BID       #PAH  #RV failure  ::TTE (9/15/23): EF 65-70%, RV pressure, overload, impaired relaxation of LV diastolic filling, moderately elevated PAP, RVSP 62. TTE 3/14/24 without acute changes.  ::RHC (10/31/2023) At rest PAp: 70/35 (47); PWP: 18;  CO/CI 8.1/4.3. with exercise PAp: 90/40 (57); PWP: 20; CO/CI: 11.2/6.1   ::On 4L at baseline  -Home regimen: Adempas 2.5 mg TID, Opsumit 10 mg daily, Uptravi 1600 mcg bid - > Transitioned to 800 micrograms per Dr. Johnson (3/14/24)  -Continue to diurese as tolerated (40 mg IV push every day, assess UO and RFP)     #Diarrhea  #Hypokalemia  ::Likely 2/2 Uptravi  -Check stool pathogen panel  -Monitor electrolytes, replete PRN in setting of diarrhea     #Nausea, vomiting 2/2 Uptravi  -Qtc 486, spot dose Zofran     #Orthostatic hypotension  ::Likely volume depleted iso emesis, diarrhea  ::s/p 2x 500 ml IVF given in ED  -Orthostats were negative as of 3/10/24, restarted diuresis day to day (home PO 40 mg Lasix) w/ intermittent IV pushes based on PE and RFP.     #HTN  -Hold Losartan 25 mg daily iso softer BP     #Gout flare?   -Continue Allopurinol 100 mg daily  -Would avoid colchicine in setting of diarrhea and would avoid NSAIDs given known ulcers  -Uric acid 6.5 on 3/8/24     #Migraines  #Seizures  #Bipolar disorder  -C/w home Amitriptyline 50 mg at bedtime, Prozac 20 mg daily  -C/w home Vraylar 1.5 mg -need to contact psych to continue  -C/w home Vimpat 100 mg bid  -C/w home Lamotrigine 100 mg daily  -Hold Rimegepant 75 mg every other day and Sumatriptan 100 mg bid prn given not currently having migraines  -Hold monthly Emgality 1 ml injections     #Hx SVTs/PACs/PVCs/NSVT  ::Event recorder outpatient revealing PVCs, NSVT, PACs  -Follows with cardiology outpatient, arranging for stress test as outpatient to rule out ischemic dz and referred to EP  -Tele  -Metop tartrate 50 mg BID, consider holding if softer Bps  -K>4, Mg>2     #Hx gastric ulcers  -Omeprazole 40 mg daily->panto 40 mg  -Sucralfate 1g q6h      #Recurrent PEs  ::unprovoked in 2014, recurrence after stopping AC  -C/w home Xarelto 10 mg daily     #Mild ELENA  -RT c/s for nightly CPAP     #Allergies: Loratidine 10 mg daily prn  #Neuropathic pain: C/w home  Gabapentin 300 mg every morning, 600 every night  #Misc: c/w home vit C     F: cautious pHTN  E: replete PRN  N: regular, 1.5L fluid restriction  A: PIV    DVT PPx: xarelto   GI PPx: PPI     CODE STATUS: DNR/DNI (confirmed on admission)  POA: Addi () [phone]     Reinaldo Cespedes MD PhD  PGY1 Medicine

## 2024-03-15 NOTE — CARE PLAN
The patient's goals for the shift include  patient will remain injuries free throughout day shift    The clinical goals for the shift include Pt will have pain rate below 4 by end of shift    Over the shift, the patient did not make progress toward the following goals. Barriers to progression include patient has history of pulmonary HTN. Recommendations to address these barriers include administer medications as prescribed, monitor V/S, assist patient with ADL's, monitor for N/V.

## 2024-03-16 LAB
ALBUMIN SERPL BCP-MCNC: 3.8 G/DL (ref 3.4–5)
ANION GAP SERPL CALC-SCNC: 19 MMOL/L (ref 10–20)
BASOPHILS # BLD AUTO: 0.01 X10*3/UL (ref 0–0.1)
BASOPHILS NFR BLD AUTO: 0.3 %
BUN SERPL-MCNC: 14 MG/DL (ref 6–23)
CALCIUM SERPL-MCNC: 8.7 MG/DL (ref 8.6–10.6)
CHLORIDE SERPL-SCNC: 101 MMOL/L (ref 98–107)
CO2 SERPL-SCNC: 23 MMOL/L (ref 21–32)
CREAT SERPL-MCNC: 0.86 MG/DL (ref 0.5–1.05)
EGFRCR SERPLBLD CKD-EPI 2021: 82 ML/MIN/1.73M*2
EOSINOPHIL # BLD AUTO: 0.11 X10*3/UL (ref 0–0.7)
EOSINOPHIL NFR BLD AUTO: 3.6 %
ERYTHROCYTE [DISTWIDTH] IN BLOOD BY AUTOMATED COUNT: 14 % (ref 11.5–14.5)
GLUCOSE SERPL-MCNC: 136 MG/DL (ref 74–99)
HCT VFR BLD AUTO: 34.4 % (ref 36–46)
HGB BLD-MCNC: 10.7 G/DL (ref 12–16)
IMM GRANULOCYTES # BLD AUTO: 0.01 X10*3/UL (ref 0–0.7)
IMM GRANULOCYTES NFR BLD AUTO: 0.3 % (ref 0–0.9)
LYMPHOCYTES # BLD AUTO: 1.11 X10*3/UL (ref 1.2–4.8)
LYMPHOCYTES NFR BLD AUTO: 36.4 %
MAGNESIUM SERPL-MCNC: 2.14 MG/DL (ref 1.6–2.4)
MCH RBC QN AUTO: 27 PG (ref 26–34)
MCHC RBC AUTO-ENTMCNC: 31.1 G/DL (ref 32–36)
MCV RBC AUTO: 87 FL (ref 80–100)
MONOCYTES # BLD AUTO: 0.26 X10*3/UL (ref 0.1–1)
MONOCYTES NFR BLD AUTO: 8.5 %
NEUTROPHILS # BLD AUTO: 1.55 X10*3/UL (ref 1.2–7.7)
NEUTROPHILS NFR BLD AUTO: 50.9 %
NRBC BLD-RTO: 0 /100 WBCS (ref 0–0)
PHOSPHATE SERPL-MCNC: 3.7 MG/DL (ref 2.5–4.9)
PLATELET # BLD AUTO: 250 X10*3/UL (ref 150–450)
POTASSIUM SERPL-SCNC: 3.7 MMOL/L (ref 3.5–5.3)
RBC # BLD AUTO: 3.96 X10*6/UL (ref 4–5.2)
SODIUM SERPL-SCNC: 139 MMOL/L (ref 136–145)
WBC # BLD AUTO: 3.1 X10*3/UL (ref 4.4–11.3)

## 2024-03-16 PROCEDURE — 94640 AIRWAY INHALATION TREATMENT: CPT

## 2024-03-16 PROCEDURE — 2500000002 HC RX 250 W HCPCS SELF ADMINISTERED DRUGS (ALT 637 FOR MEDICARE OP, ALT 636 FOR OP/ED)

## 2024-03-16 PROCEDURE — 80069 RENAL FUNCTION PANEL: CPT

## 2024-03-16 PROCEDURE — 85025 COMPLETE CBC W/AUTO DIFF WBC: CPT

## 2024-03-16 PROCEDURE — 1200000002 HC GENERAL ROOM WITH TELEMETRY DAILY

## 2024-03-16 PROCEDURE — 2500000001 HC RX 250 WO HCPCS SELF ADMINISTERED DRUGS (ALT 637 FOR MEDICARE OP)

## 2024-03-16 PROCEDURE — 2500000001 HC RX 250 WO HCPCS SELF ADMINISTERED DRUGS (ALT 637 FOR MEDICARE OP): Performed by: STUDENT IN AN ORGANIZED HEALTH CARE EDUCATION/TRAINING PROGRAM

## 2024-03-16 PROCEDURE — 2500000005 HC RX 250 GENERAL PHARMACY W/O HCPCS

## 2024-03-16 PROCEDURE — 36415 COLL VENOUS BLD VENIPUNCTURE: CPT

## 2024-03-16 PROCEDURE — 83735 ASSAY OF MAGNESIUM: CPT

## 2024-03-16 PROCEDURE — 2500000004 HC RX 250 GENERAL PHARMACY W/ HCPCS (ALT 636 FOR OP/ED): Performed by: STUDENT IN AN ORGANIZED HEALTH CARE EDUCATION/TRAINING PROGRAM

## 2024-03-16 PROCEDURE — 2500000002 HC RX 250 W HCPCS SELF ADMINISTERED DRUGS (ALT 637 FOR MEDICARE OP, ALT 636 FOR OP/ED): Performed by: STUDENT IN AN ORGANIZED HEALTH CARE EDUCATION/TRAINING PROGRAM

## 2024-03-16 PROCEDURE — 2500000004 HC RX 250 GENERAL PHARMACY W/ HCPCS (ALT 636 FOR OP/ED)

## 2024-03-16 PROCEDURE — 99232 SBSQ HOSP IP/OBS MODERATE 35: CPT | Performed by: STUDENT IN AN ORGANIZED HEALTH CARE EDUCATION/TRAINING PROGRAM

## 2024-03-16 RX ORDER — FUROSEMIDE 10 MG/ML
40 INJECTION INTRAMUSCULAR; INTRAVENOUS ONCE
Status: COMPLETED | OUTPATIENT
Start: 2024-03-16 | End: 2024-03-16

## 2024-03-16 RX ADMIN — SUCRALFATE 1 G: 1 TABLET ORAL at 00:07

## 2024-03-16 RX ADMIN — ONDANSETRON HYDROCHLORIDE 4 MG: 4 TABLET, FILM COATED ORAL at 08:22

## 2024-03-16 RX ADMIN — SUCRALFATE 1 G: 1 TABLET ORAL at 23:21

## 2024-03-16 RX ADMIN — SUCRALFATE 1 G: 1 TABLET ORAL at 06:05

## 2024-03-16 RX ADMIN — GABAPENTIN 600 MG: 300 CAPSULE ORAL at 20:23

## 2024-03-16 RX ADMIN — ACETAMINOPHEN 975 MG: 325 TABLET ORAL at 14:58

## 2024-03-16 RX ADMIN — SELEXIPAG 800 MCG: 800 TABLET, COATED ORAL at 20:21

## 2024-03-16 RX ADMIN — SELEXIPAG 800 MCG: 800 TABLET, COATED ORAL at 08:24

## 2024-03-16 RX ADMIN — LACOSAMIDE 100 MG: 100 TABLET, FILM COATED ORAL at 20:23

## 2024-03-16 RX ADMIN — SUCRALFATE 1 G: 1 TABLET ORAL at 12:00

## 2024-03-16 RX ADMIN — RIOCIGUAT 2.5 MG: 2.5 TABLET, FILM COATED ORAL at 20:22

## 2024-03-16 RX ADMIN — ACETAMINOPHEN 975 MG: 325 TABLET ORAL at 20:20

## 2024-03-16 RX ADMIN — Medication: at 23:21

## 2024-03-16 RX ADMIN — ALLOPURINOL 100 MG: 100 TABLET ORAL at 08:22

## 2024-03-16 RX ADMIN — LAMOTRIGINE 100 MG: 100 TABLET ORAL at 08:34

## 2024-03-16 RX ADMIN — IPRATROPIUM BROMIDE AND ALBUTEROL SULFATE 3 ML: .5; 3 SOLUTION RESPIRATORY (INHALATION) at 20:29

## 2024-03-16 RX ADMIN — RIVAROXABAN 10 MG: 20 TABLET, FILM COATED ORAL at 08:22

## 2024-03-16 RX ADMIN — MACITENTAN 10 MG: 10 TABLET, FILM COATED ORAL at 22:13

## 2024-03-16 RX ADMIN — OXYCODONE HYDROCHLORIDE AND ACETAMINOPHEN 500 MG: 500 TABLET ORAL at 08:22

## 2024-03-16 RX ADMIN — Medication 400 MG: at 08:22

## 2024-03-16 RX ADMIN — SUCRALFATE 1 G: 1 TABLET ORAL at 17:00

## 2024-03-16 RX ADMIN — FLUOXETINE 20 MG: 10 CAPSULE ORAL at 08:34

## 2024-03-16 RX ADMIN — AMITRIPTYLINE HYDROCHLORIDE 50 MG: 50 TABLET, FILM COATED ORAL at 20:22

## 2024-03-16 RX ADMIN — ONDANSETRON HYDROCHLORIDE 4 MG: 4 TABLET, FILM COATED ORAL at 17:00

## 2024-03-16 RX ADMIN — IPRATROPIUM BROMIDE AND ALBUTEROL SULFATE 3 ML: .5; 3 SOLUTION RESPIRATORY (INHALATION) at 15:46

## 2024-03-16 RX ADMIN — FUROSEMIDE 40 MG: 40 TABLET ORAL at 08:22

## 2024-03-16 RX ADMIN — POTASSIUM CHLORIDE 20 MEQ: 1.5 POWDER, FOR SOLUTION ORAL at 08:22

## 2024-03-16 RX ADMIN — RIOCIGUAT 2.5 MG: 2.5 TABLET, FILM COATED ORAL at 15:01

## 2024-03-16 RX ADMIN — RIOCIGUAT 2.5 MG: 2.5 TABLET, FILM COATED ORAL at 08:23

## 2024-03-16 RX ADMIN — IPRATROPIUM BROMIDE AND ALBUTEROL SULFATE 3 ML: .5; 3 SOLUTION RESPIRATORY (INHALATION) at 10:00

## 2024-03-16 RX ADMIN — FUROSEMIDE 40 MG: 10 INJECTION, SOLUTION INTRAMUSCULAR; INTRAVENOUS at 12:00

## 2024-03-16 RX ADMIN — Medication: at 08:31

## 2024-03-16 RX ADMIN — Medication 4 L/MIN: at 12:45

## 2024-03-16 RX ADMIN — PANTOPRAZOLE SODIUM 40 MG: 40 TABLET, DELAYED RELEASE ORAL at 06:05

## 2024-03-16 RX ADMIN — METOPROLOL TARTRATE 50 MG: 50 TABLET, FILM COATED ORAL at 08:22

## 2024-03-16 RX ADMIN — Medication 3 L/MIN: at 12:09

## 2024-03-16 RX ADMIN — GABAPENTIN 300 MG: 300 CAPSULE ORAL at 08:22

## 2024-03-16 RX ADMIN — LACOSAMIDE 100 MG: 100 TABLET, FILM COATED ORAL at 08:22

## 2024-03-16 RX ADMIN — ACETAMINOPHEN 975 MG: 325 TABLET ORAL at 08:22

## 2024-03-16 ASSESSMENT — PAIN - FUNCTIONAL ASSESSMENT
PAIN_FUNCTIONAL_ASSESSMENT: 0-10

## 2024-03-16 ASSESSMENT — COGNITIVE AND FUNCTIONAL STATUS - GENERAL
MOVING TO AND FROM BED TO CHAIR: A LITTLE
DRESSING REGULAR LOWER BODY CLOTHING: A LITTLE
DAILY ACTIVITIY SCORE: 18
TURNING FROM BACK TO SIDE WHILE IN FLAT BAD: A LITTLE
STANDING UP FROM CHAIR USING ARMS: A LITTLE
MOBILITY SCORE: 18
EATING MEALS: A LITTLE
TOILETING: A LITTLE
HELP NEEDED FOR BATHING: A LITTLE
CLIMB 3 TO 5 STEPS WITH RAILING: A LITTLE
MOVING FROM LYING ON BACK TO SITTING ON SIDE OF FLAT BED WITH BEDRAILS: A LITTLE
WALKING IN HOSPITAL ROOM: A LITTLE
PERSONAL GROOMING: A LITTLE
DRESSING REGULAR UPPER BODY CLOTHING: A LITTLE
DAILY ACTIVITIY SCORE: 24
MOBILITY SCORE: 24

## 2024-03-16 ASSESSMENT — PAIN SCALES - GENERAL
PAINLEVEL_OUTOF10: 4
PAINLEVEL_OUTOF10: 0 - NO PAIN

## 2024-03-16 NOTE — CARE PLAN
Problem: Discharge Planning  Goal: Discharge to home or other facility with appropriate resources  Outcome: Progressing     Problem: Chronic Conditions and Co-morbidities  Goal: Patient's chronic conditions and co-morbidity symptoms are monitored and maintained or improved  Outcome: Progressing     Problem: Nutrition  Goal: Oral intake greater 75%  Outcome: Met   The patient's goals for the shift include Patient will have good po intake.     The clinical goals for the shift include Patient will havve decrease s/s of nausea    Over the shift, the patient did not make progress toward the following goals. Barriers to progression include Nausea. Recommendations to address these barriers include Frequent monitoring of nausea and vomiting.

## 2024-03-16 NOTE — PROGRESS NOTES
"Jazmin Hein is a 49 y.o. female on day 5 of admission presenting with Allergic reaction to drug, initial encounter.  Patient will need home oxygen at discharge.  [facility] prior DME unable to accept due to insurance.  Referral placed with [facility], but will need oxygen testing for acceptance.      Physical Exam    Last Recorded Vitals  Blood pressure 117/60, pulse 74, temperature 36.5 °C (97.7 °F), resp. rate 19, height 1.626 m (5' 4\"), weight 109 kg (239 lb 13.8 oz), SpO2 93 %.  Intake/Output last 3 Shifts:  I/O last 3 completed shifts:  In: 720 (6.3 mL/kg) [P.O.:720]  Out: 3625 (31.5 mL/kg) [Urine:3625 (0.9 mL/kg/hr)]  Weight: 115.1 kg           Assessment/Plan   Principal Problem:    Allergic reaction to drug, initial encounter        Melanie Lewis RN      "

## 2024-03-16 NOTE — NURSING NOTE
"Patient's BP measured 92/53 with a MAP of 66. Dr Ellen Meyer made aware via secure chat. Message acknowledged. Per Mitch \"hold Lopressor 50 mg PO.\" Upon assessment, patient denies dizziness, weakness or any changes. WCTM.  "

## 2024-03-16 NOTE — SIGNIFICANT EVENT
Rapid Response RN Note    Rapid response RN at bedside for RADAR score 8 due to the following VS: T 36.6 °Celsius; HR 77 ; RR 18; BP 90/60; SPO2 91% on 5L with home cpap on.  Oxygen goal according to chart is >92 %.  Pt pOx staying around 88-89% while asleep on cpap w/oxygen connected on the machine side.  RT Yaneth SWANSON called to adjust home cpap oxygen.Pt placed on 6L into her cpap closer to nasal prongs and without extension tubing and pt is now staying 92-94%. Patient's blood pressure at her baseline.     Pt denied pain, sob or lightheadedness and awakened easily. No acute interventions necessary,  Staff to page rapid response for any concerns or acute change in condition/VS.

## 2024-03-17 ENCOUNTER — APPOINTMENT (OUTPATIENT)
Dept: RADIOLOGY | Facility: HOSPITAL | Age: 49
DRG: 556 | End: 2024-03-17
Payer: COMMERCIAL

## 2024-03-17 LAB
ALBUMIN SERPL BCP-MCNC: 3.5 G/DL (ref 3.4–5)
ANION GAP SERPL CALC-SCNC: 14 MMOL/L (ref 10–20)
BUN SERPL-MCNC: 16 MG/DL (ref 6–23)
CALCIUM SERPL-MCNC: 8.1 MG/DL (ref 8.6–10.6)
CHLORIDE SERPL-SCNC: 100 MMOL/L (ref 98–107)
CO2 SERPL-SCNC: 29 MMOL/L (ref 21–32)
CREAT SERPL-MCNC: 0.7 MG/DL (ref 0.5–1.05)
EGFRCR SERPLBLD CKD-EPI 2021: >90 ML/MIN/1.73M*2
GLUCOSE SERPL-MCNC: 91 MG/DL (ref 74–99)
MAGNESIUM SERPL-MCNC: 2.14 MG/DL (ref 1.6–2.4)
PHOSPHATE SERPL-MCNC: 4.6 MG/DL (ref 2.5–4.9)
POTASSIUM SERPL-SCNC: 3.4 MMOL/L (ref 3.5–5.3)
SODIUM SERPL-SCNC: 140 MMOL/L (ref 136–145)

## 2024-03-17 PROCEDURE — 94640 AIRWAY INHALATION TREATMENT: CPT

## 2024-03-17 PROCEDURE — 2500000002 HC RX 250 W HCPCS SELF ADMINISTERED DRUGS (ALT 637 FOR MEDICARE OP, ALT 636 FOR OP/ED): Performed by: STUDENT IN AN ORGANIZED HEALTH CARE EDUCATION/TRAINING PROGRAM

## 2024-03-17 PROCEDURE — 2500000001 HC RX 250 WO HCPCS SELF ADMINISTERED DRUGS (ALT 637 FOR MEDICARE OP)

## 2024-03-17 PROCEDURE — 80069 RENAL FUNCTION PANEL: CPT | Performed by: STUDENT IN AN ORGANIZED HEALTH CARE EDUCATION/TRAINING PROGRAM

## 2024-03-17 PROCEDURE — 2500000004 HC RX 250 GENERAL PHARMACY W/ HCPCS (ALT 636 FOR OP/ED)

## 2024-03-17 PROCEDURE — 1200000002 HC GENERAL ROOM WITH TELEMETRY DAILY

## 2024-03-17 PROCEDURE — 83735 ASSAY OF MAGNESIUM: CPT | Performed by: STUDENT IN AN ORGANIZED HEALTH CARE EDUCATION/TRAINING PROGRAM

## 2024-03-17 PROCEDURE — 99231 SBSQ HOSP IP/OBS SF/LOW 25: CPT | Performed by: STUDENT IN AN ORGANIZED HEALTH CARE EDUCATION/TRAINING PROGRAM

## 2024-03-17 PROCEDURE — 2500000005 HC RX 250 GENERAL PHARMACY W/O HCPCS

## 2024-03-17 PROCEDURE — 2500000002 HC RX 250 W HCPCS SELF ADMINISTERED DRUGS (ALT 637 FOR MEDICARE OP, ALT 636 FOR OP/ED)

## 2024-03-17 PROCEDURE — 2500000004 HC RX 250 GENERAL PHARMACY W/ HCPCS (ALT 636 FOR OP/ED): Performed by: STUDENT IN AN ORGANIZED HEALTH CARE EDUCATION/TRAINING PROGRAM

## 2024-03-17 PROCEDURE — 71045 X-RAY EXAM CHEST 1 VIEW: CPT

## 2024-03-17 PROCEDURE — 36415 COLL VENOUS BLD VENIPUNCTURE: CPT | Performed by: STUDENT IN AN ORGANIZED HEALTH CARE EDUCATION/TRAINING PROGRAM

## 2024-03-17 PROCEDURE — 2500000001 HC RX 250 WO HCPCS SELF ADMINISTERED DRUGS (ALT 637 FOR MEDICARE OP): Performed by: STUDENT IN AN ORGANIZED HEALTH CARE EDUCATION/TRAINING PROGRAM

## 2024-03-17 RX ORDER — ONDANSETRON HYDROCHLORIDE 2 MG/ML
4 INJECTION, SOLUTION INTRAVENOUS ONCE
Status: COMPLETED | OUTPATIENT
Start: 2024-03-17 | End: 2024-03-17

## 2024-03-17 RX ORDER — POTASSIUM CHLORIDE 20 MEQ/1
40 TABLET, EXTENDED RELEASE ORAL ONCE
Status: COMPLETED | OUTPATIENT
Start: 2024-03-17 | End: 2024-03-17

## 2024-03-17 RX ORDER — FUROSEMIDE 10 MG/ML
40 INJECTION INTRAMUSCULAR; INTRAVENOUS ONCE
Status: COMPLETED | OUTPATIENT
Start: 2024-03-17 | End: 2024-03-17

## 2024-03-17 RX ADMIN — IPRATROPIUM BROMIDE AND ALBUTEROL SULFATE 3 ML: .5; 3 SOLUTION RESPIRATORY (INHALATION) at 23:03

## 2024-03-17 RX ADMIN — SELEXIPAG 800 MCG: 800 TABLET, COATED ORAL at 20:55

## 2024-03-17 RX ADMIN — RIOCIGUAT 2.5 MG: 2.5 TABLET, FILM COATED ORAL at 08:55

## 2024-03-17 RX ADMIN — IPRATROPIUM BROMIDE AND ALBUTEROL SULFATE 3 ML: .5; 3 SOLUTION RESPIRATORY (INHALATION) at 08:02

## 2024-03-17 RX ADMIN — ACETAMINOPHEN 975 MG: 325 TABLET ORAL at 16:05

## 2024-03-17 RX ADMIN — GABAPENTIN 600 MG: 300 CAPSULE ORAL at 20:52

## 2024-03-17 RX ADMIN — GABAPENTIN 300 MG: 300 CAPSULE ORAL at 08:52

## 2024-03-17 RX ADMIN — RIOCIGUAT 2.5 MG: 2.5 TABLET, FILM COATED ORAL at 20:56

## 2024-03-17 RX ADMIN — FUROSEMIDE 40 MG: 10 INJECTION, SOLUTION INTRAVENOUS at 09:48

## 2024-03-17 RX ADMIN — IPRATROPIUM BROMIDE AND ALBUTEROL SULFATE 3 ML: .5; 3 SOLUTION RESPIRATORY (INHALATION) at 13:57

## 2024-03-17 RX ADMIN — POTASSIUM CHLORIDE 40 MEQ: 1500 TABLET, EXTENDED RELEASE ORAL at 08:58

## 2024-03-17 RX ADMIN — RIVAROXABAN 10 MG: 20 TABLET, FILM COATED ORAL at 08:51

## 2024-03-17 RX ADMIN — SUCRALFATE 1 G: 1 TABLET ORAL at 23:55

## 2024-03-17 RX ADMIN — LAMOTRIGINE 100 MG: 100 TABLET ORAL at 08:51

## 2024-03-17 RX ADMIN — OXYCODONE HYDROCHLORIDE AND ACETAMINOPHEN 500 MG: 500 TABLET ORAL at 08:51

## 2024-03-17 RX ADMIN — AMITRIPTYLINE HYDROCHLORIDE 50 MG: 50 TABLET, FILM COATED ORAL at 20:55

## 2024-03-17 RX ADMIN — METOPROLOL TARTRATE 50 MG: 50 TABLET, FILM COATED ORAL at 08:51

## 2024-03-17 RX ADMIN — Medication 6 L/MIN: at 15:32

## 2024-03-17 RX ADMIN — SELEXIPAG 800 MCG: 800 TABLET, COATED ORAL at 08:55

## 2024-03-17 RX ADMIN — ALLOPURINOL 100 MG: 100 TABLET ORAL at 08:50

## 2024-03-17 RX ADMIN — ONDANSETRON HYDROCHLORIDE 4 MG: 4 TABLET, FILM COATED ORAL at 08:52

## 2024-03-17 RX ADMIN — ONDANSETRON 4 MG: 2 INJECTION INTRAMUSCULAR; INTRAVENOUS at 14:04

## 2024-03-17 RX ADMIN — PANTOPRAZOLE SODIUM 40 MG: 40 TABLET, DELAYED RELEASE ORAL at 06:25

## 2024-03-17 RX ADMIN — ONDANSETRON HYDROCHLORIDE 4 MG: 4 TABLET, FILM COATED ORAL at 12:48

## 2024-03-17 RX ADMIN — SUCRALFATE 1 G: 1 TABLET ORAL at 17:04

## 2024-03-17 RX ADMIN — MACITENTAN 10 MG: 10 TABLET, FILM COATED ORAL at 22:23

## 2024-03-17 RX ADMIN — SUCRALFATE 1 G: 1 TABLET ORAL at 12:19

## 2024-03-17 RX ADMIN — FLUOXETINE 20 MG: 10 CAPSULE ORAL at 08:50

## 2024-03-17 RX ADMIN — Medication 400 MG: at 08:50

## 2024-03-17 RX ADMIN — ONDANSETRON HYDROCHLORIDE 4 MG: 4 TABLET, FILM COATED ORAL at 17:04

## 2024-03-17 RX ADMIN — Medication 5 L/MIN: at 08:03

## 2024-03-17 RX ADMIN — RIOCIGUAT 2.5 MG: 2.5 TABLET, FILM COATED ORAL at 16:06

## 2024-03-17 RX ADMIN — SUCRALFATE 1 G: 1 TABLET ORAL at 06:25

## 2024-03-17 RX ADMIN — ACETAMINOPHEN 975 MG: 325 TABLET ORAL at 08:50

## 2024-03-17 RX ADMIN — LACOSAMIDE 100 MG: 100 TABLET, FILM COATED ORAL at 08:51

## 2024-03-17 RX ADMIN — ACETAMINOPHEN 975 MG: 325 TABLET ORAL at 20:50

## 2024-03-17 RX ADMIN — LACOSAMIDE 100 MG: 100 TABLET, FILM COATED ORAL at 20:52

## 2024-03-17 ASSESSMENT — COGNITIVE AND FUNCTIONAL STATUS - GENERAL
DAILY ACTIVITIY SCORE: 24
MOBILITY SCORE: 24

## 2024-03-17 ASSESSMENT — PAIN - FUNCTIONAL ASSESSMENT: PAIN_FUNCTIONAL_ASSESSMENT: 0-10

## 2024-03-17 ASSESSMENT — PAIN SCALES - GENERAL: PAINLEVEL_OUTOF10: 0 - NO PAIN

## 2024-03-17 NOTE — PROGRESS NOTES
"48 y/o F PMH group 1 pulmonary arterial hypertension on Riociguat, Macitentan, and Selexipag presenting for jaw pain, poor PO intake, and diarrhea consistent with side effects of uptitration of her Selexipag. Weaned Selexipag to 800 mcg BID and diuresing.    Subjective      Overnight, no acute events. This morning, she says her breathing is stable from prior but just about back to her baseline. She said she is open to one more day of IV diuresis and then plans to discharge home tomorrow if possible.    Objective   Last Recorded Vitals  /64   Pulse 68   Temp 36.6 °C (97.9 °F)   Resp 16   Ht 1.626 m (5' 4\")   Wt 108 kg (239 lb)   SpO2 91%   BMI 41.02 kg/m²      Intake/Output last 3 Shifts:    Intake/Output Summary (Last 24 hours) at 3/17/2024 1400  Last data filed at 3/17/2024 1214  Gross per 24 hour   Intake --   Output 2000 ml   Net -2000 ml        Physical Exam  General appearance/Constitutional: no acute distress  Eyes: sclera anicteric  Head & Neck: moist mucous membranes  Respiratory/Chest Wall: clear breath sounds bilaterally, on 5L NC  Cardiovascular: regular rate and rhythm, warm extremities  Neurologic: awake, alert  Extremities: no lower extremity edema  Psychiatric: appropriate mood  Lines/Drains: 20 gauge pIV L forearm    Scheduled medications  [Held by provider] cariprazine, 1.5 mg, oral, Daily  riociguat, 2.5 mg, oral, TID  macitentan, 10 mg, oral, Daily  selexipag, 800 mcg, oral, BID  acetaminophen, 975 mg, oral, TID  allopurinol, 100 mg, oral, Daily  amitriptyline, 50 mg, oral, Nightly  ascorbic acid, 500 mg, oral, Daily  FLUoxetine, 20 mg, oral, Daily  gabapentin, 300 mg, oral, q AM  gabapentin, 600 mg, oral, Nightly  ipratropium-albuteroL, 3 mL, nebulization, TID  lacosamide, 100 mg, oral, BID  lamoTRIgine, 100 mg, oral, Daily  magnesium oxide, 400 mg, oral, Daily  metoprolol tartrate, 50 mg, oral, BID  ondansetron, 4 mg, intravenous, Once  pantoprazole, 40 mg, oral, Daily before " breakfast  rivaroxaban, 10 mg, oral, Daily  sucralfate, 1 g, oral, q6h SONIA      Continuous medications     PRN medications  PRN medications: benzocaine-menthol, ondansetron, oxygen, SUMAtriptan     Relevant Results     Lab Results   Component Value Date    GLUCOSE 91 03/17/2024    CALCIUM 8.1 (L) 03/17/2024     03/17/2024    K 3.4 (L) 03/17/2024    CO2 29 03/17/2024     03/17/2024    BUN 16 03/17/2024    CREATININE 0.70 03/17/2024      Assessment and Plan by Problem:   Assessment and Plan by Problem:   50 y/o F PMH SVT/PACs/PVCs s/p PPM (2019), bipolar disorder, TBI complicated by chronic migraines, and 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 Weaned Selexipag to 800 mcg BID and diuresing.    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 Interventricular septum is flattened in systole, consistent with right ventricular pressure overload. Pseudeonormal LV diastolic filling. LA moderately dilated. Normal RV global systolic function, RA severely dilated. Mild tricuspid regurgitation. RVSP severely  elevated 58.2 mmHg.   - Home regimen prior to admission was Adempas 2.5 mg TID, Macitentan 10 mg daily, Selexipag 1600 mcg BID  - Pt presented with 10 lbs weight loss, diarrhea, vomiting, poor PO intake, myalgia consistent with side effects of Selexipag. CK normal on admission.  - Initially was hypovolemic so supported with IV fluids, now taking in better PO and trying to optimize diuresis  - Selexipag decreased to 800 mcg BID on 3/14/14 per Dr. Johnson  - Last EKG 3/13/24 with QTc 458 msec  - In last 24 hours, had 2.5L urine outptut with 40 mg IV Lasix x 1 on 3/16  - Weight on 3/17 108 kg from 107 kg on 3/14/14  - Pt having intermittent episodes of nausea, intermittently with emesis- taking Zofran prior to each meal.    Plan:  - 40 mg IV Lasix today  - Dry weight seems to be about 108 kg  - Walking pulse ox today  - Continue Selexipag 800 mcg BID on discharge  - Continue home Adempas 2.5 mg TID and Macitentan 10 mg daily for pulmonary hypertension  - Continue home Rivaroxaban 10 mg daily  - Continue DuoNebs TID scheduled for now, can do PRN on discharge  - Continue Zofran 4 mg PO q4 PRN while inpt- would discharge with 4 mg PO q6 PRN  - Hold home Lasix 40 mg PO daily while diuresing with IV medication- will need to determine homegoing Lasix dose  - Will discharge with instructions to weigh herself daily and take an extra dose of diuretics if she gains 3 lbs in 2 days or 5 lbs in 1 week  - Ordered nebulizer for her as she feels DuoNebs are helping her breathe- will also send home with DuoNebs PRN  - RT consult for CPAP qHS  - Follow up with Dr. Johnson scheduled for 3/20/2024    #Migraines  - Continue home Sumatriptan 100 mg BID PRN and Amitriptyline 50 mg qHS  - Hold home, Butalbital-acetaminophen  mg capsule q4 PRN for migraines, Galcanezumab 120 mg/mL monthly injection, Rimegepant 75 mg ODT every other day while inpt    #Bipolar disorder  - Continue home Fluoxetine 20 mg daily, Lacosamide 100 mg BID,  Lamotrigine 100 mg daily  - Hold home Cariprazine 1.5 mg daily while inpt    #Peripheral neuropathy  - Continue home Gabapentin 300 mg daily and Gabapentin 600 mg qHS, did not use home Methocarbamol 750 mg q8 PRN when ordered so will d/c order while inpt but OK to use on discharge    #SVT/PACs/PVCs s/p PPM (2019)  #History of HTN  - Hold home Losartan 25 mg daily for now while blood pressures are low- may hold on discharge depending on how she does  - Continue home Metoprolol Tartrate 50 mg BID    #History of gastric ulcers  - Seen on EGD 4/2018  - Continue home Omeprazole 40 mg daily as Pantoprazole 40 mg daily while inpt due to formulary  - Continue home Sucralfate 1g q6 scheduled    MISC:  - Continue home Allopurinol 100 mg daily, Ascorbic acid 500 mg daily  - Hold home Vitamin D 63146 units weekly, Loratadine 10 mg daily, Multivitamin, and KCl 10 mEq daily for now- can resume on discharge     Fluids: none indicated  Nutrition: regular diet, 2L fluid restriction  Access: 20 gauge pIV L forearm  DVT ppx: on home Rivaroxaban 10 mg daily  GI ppx: continuing home Omeprazole 40 mg daily as Pantoprazole 40 mg daily while inpt due to formulary  Dispo: TBD, likely home with no needs on 3/18  Surrogate Decision Maker if Needed:  (Addi) [phone]  CODE STATUS: DNAR/DNI    Lynda Whitman MD  PGY3 Internal Medicine

## 2024-03-17 NOTE — CARE PLAN
Problem: Discharge Planning  Goal: Discharge to home or other facility with appropriate resources  Outcome: Progressing     Problem: Chronic Conditions and Co-morbidities  Goal: Patient's chronic conditions and co-morbidity symptoms are monitored and maintained or improved  Outcome: Progressing     Problem: Nutrition  Goal: Promote healing  Outcome: Progressing     Problem: Respiratory  Goal: Wean oxygen to maintain O2 saturation per order/standard this shift  Outcome: Progressing     Problem: Pain  Goal: Takes deep breaths with improved pain control throughout the shift  Outcome: Progressing   The patient's goals for the shift include      The clinical goals for the shift include pt will not desat on O2 by the end of shift    Over the shift, the patient did not make progress toward the following goals. Barriers to progression include disease process. Recommendations to address these barriers include continue O2 treatment.

## 2024-03-17 NOTE — DISCHARGE INSTRUCTIONS
Hi Ms. Hein,    You were admitted due to symptoms that were consistent with side effects from your Selexipag. We initially gave you fluids due to your diarrhea and poor appetite because you were initially dehydrated. Because of your pulmonary hypertension, your body holds onto fluid differently than other peoples' bodies so once your balance was improved, we shifted our focus to drying you back out with diuretics.    We also decreased the dose of your Selexipag to 600 mcg 2 times per day per Dr. Johnson.    You seemed to benefit from the nebulized breathing treatments so we placed an order for a nebulizer which the  will help you obtain. We also wrote a new prescription for your oxygen so you can get the concentrator that goes to a higher flow. You should use 5 liters per minute at rest and then 6 liters per minute when walking to keep your oxygen saturation around 90%.    Medication changes on discharge:  - If you gain 3 lbs in 2 days or 5 lbs in 1 week, please take an extra dose of the Lasix and reach out to Dr. Johnson' office  - STOP losartan  - START DuoNebs- you can use these breathing treatments up to every 4 hours as needed for shortness of breath or wheezing  - START Zofran 4 mg every 6 hours as needed for nausea or vomiting, please only take this medicine if you really needed  - DECREASE Selexipag to 600 mcg 2 times per day    - Please make sure to weigh yourself daily or every other day. If you gain more then 3 pounds in 2 days or 5 pounds in 1 week, please take an extra dose of your Lasix and call Dr. Johnson  - Try to drink no more than 2 liters of fluid per day and try to follow a low salt diet if possible to maintain your body's fluid balance    Appointments on discharge:  - Follow up with Dr. Johnson     If you develop any new or concerning symptoms, please feel free to come back to the emergency room and we would be happy to treat you!    Take care,  The  Pulmonology Team

## 2024-03-17 NOTE — PROGRESS NOTES
"50 y/o F PMH group 1 pulmonary arterial hypertension on Riociguat, Macitentan, and Selexipag presenting for jaw pain, poor PO intake, and diarrhea consistent with side effects of uptitration of her Selexipag. Weaned Selexipag to 800 mcg BID. Now trying to optimize volume status.    Subjective        Overnight, no acute events. Her nausea is controlled with Zofran before meals. She feels her breathing is better than when she came in.    Objective     Last Recorded Vitals  BP 94/56   Pulse 77   Temp 36.6 °C (97.9 °F)   Resp 17   Ht 1.626 m (5' 4\")   Wt 109 kg (239 lb 13.8 oz)   SpO2 96%   BMI 41.17 kg/m²      Intake/Output last 3 Shifts:    Intake/Output Summary (Last 24 hours) at 3/16/2024 2216  Last data filed at 3/16/2024 1700  Gross per 24 hour   Intake --   Output 2500 ml   Net -2500 ml        Physical Exam  - No acute distress, resting comfortably on 4L NC, no conversational dyspnea  - Clear breath sounds bilaterally  - No lower extremity edema    Scheduled medications  [Held by provider] cariprazine, 1.5 mg, oral, Daily  riociguat, 2.5 mg, oral, TID  macitentan, 10 mg, oral, Daily  selexipag, 800 mcg, oral, BID  acetaminophen, 975 mg, oral, TID  allopurinol, 100 mg, oral, Daily  amitriptyline, 50 mg, oral, Nightly  ascorbic acid, 500 mg, oral, Daily  FLUoxetine, 20 mg, oral, Daily  gabapentin, 300 mg, oral, q AM  gabapentin, 600 mg, oral, Nightly  ipratropium-albuteroL, 3 mL, nebulization, TID  lacosamide, 100 mg, oral, BID  lamoTRIgine, 100 mg, oral, Daily  magnesium oxide, 400 mg, oral, Daily  metoprolol tartrate, 50 mg, oral, BID  pantoprazole, 40 mg, oral, Daily before breakfast  rivaroxaban, 10 mg, oral, Daily  sucralfate, 1 g, oral, q6h SONIA      Continuous medications     PRN medications  PRN medications: benzocaine-menthol, loperamide, loratadine, methocarbamol, ondansetron, oxygen, SUMAtriptan     Relevant Results    Lab Results   Component Value Date    WBC 3.1 (L) 03/16/2024    HGB 10.7 (L) " 03/16/2024    HCT 34.4 (L) 03/16/2024    MCV 87 03/16/2024     03/16/2024      Lab Results   Component Value Date    GLUCOSE 136 (H) 03/16/2024    CALCIUM 8.7 03/16/2024     03/16/2024    K 3.7 03/16/2024    CO2 23 03/16/2024     03/16/2024    BUN 14 03/16/2024    CREATININE 0.86 03/16/2024        Assessment and Plan by Problem:   48 y/o F PMH group 1 pulmonary arterial hypertension on Riociguat, Macitentan, and Selexipag presenting for jaw pain, poor PO intake, and diarrhea consistent with side effects of uptitration of her Selexipag. Weaned Selexipag to 800 mcg BID. Now trying to optimize volume status.    Updates 3/16:  - 40 mg IV Lasix today to try to optimize her diuretics prior to discharge- follow up intake and output this evening with a goal of net negative 1L  - Weight this  kg from 107 kg on 3/14      #PAH  #RV failure  ::TTE (9/15/23): EF 65-70%, RV pressure, overload, impaired relaxation of LV diastolic filling, moderately elevated PAP, RVSP 62. TTE 3/14/24 without acute changes.  ::RHC (10/31/2023) At rest PAp: 70/35 (47); PWP: 18; CO/CI 8.1/4.3. with exercise PAp: 90/40 (57); PWP: 20; CO/CI: 11.2/6.1   ::On 4L at baseline  -Home regimen: Adempas 2.5 mg TID, Opsumit 10 mg daily, Uptravi 1600 mcg bid - > Transitioned to 800 micrograms per Dr. Johnson (3/14/24)  -Continue to diurese as tolerated (40 mg IV push every day, assess UO and RFP)     #Diarrhea  #Hypokalemia  ::Likely 2/2 Uptravi  -Check stool pathogen panel  -Monitor electrolytes, replete PRN in setting of diarrhea     #Nausea, vomiting 2/2 Uptravi  -Qtc 486, spot dose Zofran     #Orthostatic hypotension  ::Likely volume depleted iso emesis, diarrhea  ::s/p 2x 500 ml IVF given in ED  -Orthostats were negative as of 3/10/24, restarted diuresis day to day (home PO 40 mg Lasix) w/ intermittent IV pushes based on PE and RFP.     #HTN  -Hold Losartan 25 mg daily iso softer BP     #Gout flare?   -Continue Allopurinol 100 mg  daily  -Would avoid colchicine in setting of diarrhea and would avoid NSAIDs given known ulcers  -Uric acid 6.5 on 3/8/24     #Migraines  #Seizures  #Bipolar disorder  -C/w home Amitriptyline 50 mg at bedtime, Prozac 20 mg daily  -C/w home Vraylar 1.5 mg -need to contact psych to continue  -C/w home Vimpat 100 mg bid  -C/w home Lamotrigine 100 mg daily  -Hold Rimegepant 75 mg every other day and Sumatriptan 100 mg bid prn given not currently having migraines  -Hold monthly Emgality 1 ml injections     #Hx SVTs/PACs/PVCs/NSVT  ::Event recorder outpatient revealing PVCs, NSVT, PACs  -Follows with cardiology outpatient, arranging for stress test as outpatient to rule out ischemic dz and referred to EP  -Tele  -Metop tartrate 50 mg BID, consider holding if softer Bps  -K>4, Mg>2     #Hx gastric ulcers  -Omeprazole 40 mg daily->panto 40 mg  -Sucralfate 1g q6h      #Recurrent PEs  ::unprovoked in 2014, recurrence after stopping AC  -C/w home Xarelto 10 mg daily     #Mild ELENA  -RT c/s for nightly CPAP     #Allergies: Loratidine 10 mg daily prn  #Neuropathic pain: C/w home Gabapentin 300 mg every morning, 600 every night  #Misc: c/w home vit C

## 2024-03-17 NOTE — CARE PLAN
Problem: Discharge Planning  Goal: Discharge to home or other facility with appropriate resources  Outcome: Progressing     Problem: Chronic Conditions and Co-morbidities  Goal: Patient's chronic conditions and co-morbidity symptoms are monitored and maintained or improved  Outcome: Progressing     Problem: Respiratory  Goal: Clear secretions with interventions this shift  Outcome: Progressing   The patient's goals for the shift include  Patient will ambulate 3x a day    The clinical goals for the shift include Patient nausea will decrease

## 2024-03-17 NOTE — PROGRESS NOTES
3/17/24 1000 Transitional Care Coordinator Notes:    Team to place a walking pulse ox order in to obtain new concentrator at home. Anticipating to discharge home tomorrow.    1415 Updates: walking pulse ox completed and results sent to [facility].                 Assessment/Plan   Principal Problem:    Allergic reaction to drug, initial encounter    Discharge Plans: discharge to home           Paola Nash RN

## 2024-03-17 NOTE — NURSING NOTE
Walking Pulse Oximetry Test     Sitting RA 86%     Walking 5 liters oxygen 89%  Walking 6 liters Oxygen 91%

## 2024-03-17 NOTE — SIGNIFICANT EVENT
03/17/24 1520   Onset Documentation   Rapid Response Initiated By Radar auto page   [place] [facility] [place] [id number][place]   Pager Time [phone]   Arrival Time 1520   Event End Time 1525   Level II Called No   Primary Reason for Call Radar auto page     Rapid Response Note    Radar auto-page received for a radar score of 6 with the following vital signs: 36.3, 72, 20, 88/47, 90%.  Vital signs were confirmed and reviewed with primary RN.  Patient is at her current baseline.  There are no indications for interventions by Rapid Response at this time.  RN to contact Rapid Response with any future concerns or signs of clinical decompensation.

## 2024-03-17 NOTE — SIGNIFICANT EVENT
03/17/24 1532   Readings   Resp 18     At bedside for Radar. Pt. Pox remains at 89% fio2 on cpap sleeping. Increased to 6L bleed in while sleeping. HR 75.

## 2024-03-18 ENCOUNTER — APPOINTMENT (OUTPATIENT)
Dept: CARDIOLOGY | Facility: HOSPITAL | Age: 49
DRG: 556 | End: 2024-03-18
Payer: COMMERCIAL

## 2024-03-18 LAB
ALBUMIN SERPL BCP-MCNC: 3.6 G/DL (ref 3.4–5)
ANION GAP SERPL CALC-SCNC: 16 MMOL/L (ref 10–20)
BUN SERPL-MCNC: 13 MG/DL (ref 6–23)
CALCIUM SERPL-MCNC: 8.3 MG/DL (ref 8.6–10.6)
CHLORIDE SERPL-SCNC: 102 MMOL/L (ref 98–107)
CO2 SERPL-SCNC: 25 MMOL/L (ref 21–32)
CREAT SERPL-MCNC: 0.62 MG/DL (ref 0.5–1.05)
EGFRCR SERPLBLD CKD-EPI 2021: >90 ML/MIN/1.73M*2
GLUCOSE SERPL-MCNC: 77 MG/DL (ref 74–99)
MAGNESIUM SERPL-MCNC: 2.32 MG/DL (ref 1.6–2.4)
PHOSPHATE SERPL-MCNC: 4.1 MG/DL (ref 2.5–4.9)
POTASSIUM SERPL-SCNC: 4 MMOL/L (ref 3.5–5.3)
SODIUM SERPL-SCNC: 139 MMOL/L (ref 136–145)

## 2024-03-18 PROCEDURE — 2500000002 HC RX 250 W HCPCS SELF ADMINISTERED DRUGS (ALT 637 FOR MEDICARE OP, ALT 636 FOR OP/ED)

## 2024-03-18 PROCEDURE — 36415 COLL VENOUS BLD VENIPUNCTURE: CPT | Performed by: STUDENT IN AN ORGANIZED HEALTH CARE EDUCATION/TRAINING PROGRAM

## 2024-03-18 PROCEDURE — 2500000004 HC RX 250 GENERAL PHARMACY W/ HCPCS (ALT 636 FOR OP/ED)

## 2024-03-18 PROCEDURE — 2500000001 HC RX 250 WO HCPCS SELF ADMINISTERED DRUGS (ALT 637 FOR MEDICARE OP)

## 2024-03-18 PROCEDURE — 99232 SBSQ HOSP IP/OBS MODERATE 35: CPT

## 2024-03-18 PROCEDURE — 2500000005 HC RX 250 GENERAL PHARMACY W/O HCPCS

## 2024-03-18 PROCEDURE — 2500000002 HC RX 250 W HCPCS SELF ADMINISTERED DRUGS (ALT 637 FOR MEDICARE OP, ALT 636 FOR OP/ED): Performed by: STUDENT IN AN ORGANIZED HEALTH CARE EDUCATION/TRAINING PROGRAM

## 2024-03-18 PROCEDURE — 80069 RENAL FUNCTION PANEL: CPT | Performed by: STUDENT IN AN ORGANIZED HEALTH CARE EDUCATION/TRAINING PROGRAM

## 2024-03-18 PROCEDURE — 83735 ASSAY OF MAGNESIUM: CPT | Performed by: STUDENT IN AN ORGANIZED HEALTH CARE EDUCATION/TRAINING PROGRAM

## 2024-03-18 PROCEDURE — 2500000001 HC RX 250 WO HCPCS SELF ADMINISTERED DRUGS (ALT 637 FOR MEDICARE OP): Performed by: STUDENT IN AN ORGANIZED HEALTH CARE EDUCATION/TRAINING PROGRAM

## 2024-03-18 PROCEDURE — 99222 1ST HOSP IP/OBS MODERATE 55: CPT | Performed by: STUDENT IN AN ORGANIZED HEALTH CARE EDUCATION/TRAINING PROGRAM

## 2024-03-18 PROCEDURE — 94640 AIRWAY INHALATION TREATMENT: CPT

## 2024-03-18 PROCEDURE — 93005 ELECTROCARDIOGRAM TRACING: CPT

## 2024-03-18 PROCEDURE — 1200000002 HC GENERAL ROOM WITH TELEMETRY DAILY

## 2024-03-18 RX ORDER — FUROSEMIDE 40 MG/1
40 TABLET ORAL DAILY
Status: DISCONTINUED | OUTPATIENT
Start: 2024-03-18 | End: 2024-03-18

## 2024-03-18 RX ORDER — LIDOCAINE 560 MG/1
1 PATCH PERCUTANEOUS; TOPICAL; TRANSDERMAL DAILY
Status: DISCONTINUED | OUTPATIENT
Start: 2024-03-18 | End: 2024-03-22 | Stop reason: HOSPADM

## 2024-03-18 RX ORDER — DICLOFENAC SODIUM 10 MG/G
4 GEL TOPICAL 4 TIMES DAILY PRN
Status: DISCONTINUED | OUTPATIENT
Start: 2024-03-18 | End: 2024-03-22 | Stop reason: HOSPADM

## 2024-03-18 RX ORDER — FUROSEMIDE 10 MG/ML
40 INJECTION INTRAMUSCULAR; INTRAVENOUS ONCE
Status: COMPLETED | OUTPATIENT
Start: 2024-03-18 | End: 2024-03-18

## 2024-03-18 RX ADMIN — IPRATROPIUM BROMIDE AND ALBUTEROL SULFATE 3 ML: .5; 3 SOLUTION RESPIRATORY (INHALATION) at 14:00

## 2024-03-18 RX ADMIN — Medication 400 MG: at 08:00

## 2024-03-18 RX ADMIN — ONDANSETRON HYDROCHLORIDE 4 MG: 4 TABLET, FILM COATED ORAL at 07:55

## 2024-03-18 RX ADMIN — GABAPENTIN 300 MG: 300 CAPSULE ORAL at 08:00

## 2024-03-18 RX ADMIN — ONDANSETRON HYDROCHLORIDE 4 MG: 4 TABLET, FILM COATED ORAL at 21:31

## 2024-03-18 RX ADMIN — SUCRALFATE 1 G: 1 TABLET ORAL at 06:11

## 2024-03-18 RX ADMIN — SELEXIPAG 800 MCG: 800 TABLET, COATED ORAL at 08:03

## 2024-03-18 RX ADMIN — RIOCIGUAT 2.5 MG: 2.5 TABLET, FILM COATED ORAL at 14:37

## 2024-03-18 RX ADMIN — PANTOPRAZOLE SODIUM 40 MG: 40 TABLET, DELAYED RELEASE ORAL at 06:11

## 2024-03-18 RX ADMIN — SUCRALFATE 1 G: 1 TABLET ORAL at 12:10

## 2024-03-18 RX ADMIN — OXYCODONE HYDROCHLORIDE AND ACETAMINOPHEN 500 MG: 500 TABLET ORAL at 08:00

## 2024-03-18 RX ADMIN — GABAPENTIN 600 MG: 300 CAPSULE ORAL at 21:31

## 2024-03-18 RX ADMIN — RIOCIGUAT 2.5 MG: 2.5 TABLET, FILM COATED ORAL at 08:02

## 2024-03-18 RX ADMIN — ACETAMINOPHEN 975 MG: 325 TABLET ORAL at 08:00

## 2024-03-18 RX ADMIN — ONDANSETRON HYDROCHLORIDE 4 MG: 4 TABLET, FILM COATED ORAL at 12:10

## 2024-03-18 RX ADMIN — RIVAROXABAN 10 MG: 20 TABLET, FILM COATED ORAL at 08:00

## 2024-03-18 RX ADMIN — ACETAMINOPHEN 975 MG: 325 TABLET ORAL at 15:00

## 2024-03-18 RX ADMIN — IPRATROPIUM BROMIDE AND ALBUTEROL SULFATE 3 ML: .5; 3 SOLUTION RESPIRATORY (INHALATION) at 09:57

## 2024-03-18 RX ADMIN — SELEXIPAG 600 MCG: 200 TABLET, COATED ORAL at 21:32

## 2024-03-18 RX ADMIN — METOPROLOL TARTRATE 50 MG: 50 TABLET, FILM COATED ORAL at 21:30

## 2024-03-18 RX ADMIN — FUROSEMIDE 40 MG: 10 INJECTION, SOLUTION INTRAVENOUS at 16:03

## 2024-03-18 RX ADMIN — LAMOTRIGINE 100 MG: 100 TABLET ORAL at 08:00

## 2024-03-18 RX ADMIN — FLUOXETINE 20 MG: 10 CAPSULE ORAL at 08:00

## 2024-03-18 RX ADMIN — ALLOPURINOL 100 MG: 100 TABLET ORAL at 08:00

## 2024-03-18 RX ADMIN — ONDANSETRON HYDROCHLORIDE 4 MG: 4 TABLET, FILM COATED ORAL at 17:27

## 2024-03-18 RX ADMIN — LACOSAMIDE 100 MG: 100 TABLET, FILM COATED ORAL at 21:31

## 2024-03-18 RX ADMIN — MACITENTAN 10 MG: 10 TABLET, FILM COATED ORAL at 21:32

## 2024-03-18 RX ADMIN — LACOSAMIDE 100 MG: 100 TABLET, FILM COATED ORAL at 08:00

## 2024-03-18 RX ADMIN — ACETAMINOPHEN 975 MG: 325 TABLET ORAL at 21:31

## 2024-03-18 RX ADMIN — LIDOCAINE 1 PATCH: 4 PATCH TOPICAL at 14:00

## 2024-03-18 RX ADMIN — SUCRALFATE 1 G: 1 TABLET ORAL at 17:27

## 2024-03-18 RX ADMIN — RIOCIGUAT 2.5 MG: 2.5 TABLET, FILM COATED ORAL at 21:31

## 2024-03-18 RX ADMIN — AMITRIPTYLINE HYDROCHLORIDE 50 MG: 50 TABLET, FILM COATED ORAL at 21:31

## 2024-03-18 RX ADMIN — IPRATROPIUM BROMIDE AND ALBUTEROL SULFATE 3 ML: .5; 3 SOLUTION RESPIRATORY (INHALATION) at 21:37

## 2024-03-18 ASSESSMENT — COGNITIVE AND FUNCTIONAL STATUS - GENERAL
CLIMB 3 TO 5 STEPS WITH RAILING: A LITTLE
DAILY ACTIVITIY SCORE: 24
MOBILITY SCORE: 24
MOBILITY SCORE: 23
CLIMB 3 TO 5 STEPS WITH RAILING: A LITTLE
DAILY ACTIVITIY SCORE: 24
DAILY ACTIVITIY SCORE: 24
MOBILITY SCORE: 23
MOBILITY SCORE: 24
MOBILITY SCORE: 23
CLIMB 3 TO 5 STEPS WITH RAILING: A LITTLE
DAILY ACTIVITIY SCORE: 24
DAILY ACTIVITIY SCORE: 24

## 2024-03-18 ASSESSMENT — PAIN - FUNCTIONAL ASSESSMENT
PAIN_FUNCTIONAL_ASSESSMENT: 0-10

## 2024-03-18 ASSESSMENT — PAIN SCALES - GENERAL
PAINLEVEL_OUTOF10: 0 - NO PAIN
PAINLEVEL_OUTOF10: 6
PAINLEVEL_OUTOF10: 0 - NO PAIN
PAINLEVEL_OUTOF10: 3
PAINLEVEL_OUTOF10: 0 - NO PAIN

## 2024-03-18 ASSESSMENT — PAIN SCALES - WONG BAKER: WONGBAKER_NUMERICALRESPONSE: NO HURT

## 2024-03-18 ASSESSMENT — PAIN DESCRIPTION - LOCATION: LOCATION: BACK

## 2024-03-18 NOTE — CONSULTS
[facility]   [facility] [place]  INITIAL CONSULT NOTE       Reason For Consult: nausea    SUBJECTIVE     History Of Present Illness  Jazmin Hein is a 49 y.o. female with a past medical history of pulmonary hypertension (group 1) admitted on 3/7/2024 with poor PO intake, dehydration and diarrhea and nausea/ vomiting. GI is consulted for persistent nausea.  Patient states that her symptoms have been present since she was started on Selexipag in January of this year. She states that with each dose escalation, her symptoms would worsen and then improve by the time of the next dose escalation. This cycle would repeat itself until about a week ago when she was started on 1600mg BID of selexipag and her symptoms became constant. She was unable to tolerate PO and she presented to the hospital.  During this hospitalization, her dose has been decreased but her symptoms persist. She states that her nausea is alleviated by zofran 60-90 minutes prior to meals. Nausea is not present if she is not eating. She is still having 3 episodes of diarrhea daily. She denies abdominal pain, cramping, melena, hematemesis, hematochezia.     Review of Systems: negative except as per HPI     Past Medical History:    Past Medical History:   Diagnosis Date    Heart failure (CMS/HCC)     Hypoglycemia     Personal history of other diseases of the female genital tract 07/05/2016    History of endometriosis    Personal history of pulmonary embolism 07/11/2016    History of pulmonary embolism     Home Medications  Medications Prior to Admission   Medication Sig Dispense Refill Last Dose    Adempas 2.5 mg tablet Take 1 tablet (2.5 mg) by mouth 3 times a day.   3/8/2024    albuterol 90 mcg/actuation inhaler INHALE 1 PUFF EVERY 6 HOURS AS NEEDED 25.5 g 3     allopurinol (Zyloprim) 100 mg tablet TAKE 1 TABLET BY MOUTH DAILY 90 tablet 3 3/8/2024    amitriptyline (Elavil) 50 mg tablet TAKE 1 TABLET BY MOUTH  AT BEDTIME 90 tablet 3 3/8/2024    ascorbic acid (Vitamin C) 500 mg tablet Take 1 tablet (500 mg) by mouth once daily.   3/8/2024    blood sugar diagnostic strip as directed       butalbitaL-acetaminophen  mg capsule Take 1 capsule by mouth every 4 hours if needed (migraine).   Has supply at home, uses PRN    cariprazine (Vraylar) 1.5 mg capsule Take 1 capsule (1.5 mg) by mouth once daily.   3/8/2024    ergocalciferol (Vitamin D-2) 1.25 MG (52848 UT) capsule TAKE 1 CAPSULE BY MOUTH ONCE WEEKLY 12 capsule 3     FLUoxetine (PROzac) 20 mg tablet TAKE 1 TABLET BY MOUTH ONCE DAILY 90 tablet 3 3/8/2024    furosemide (Lasix) 40 mg tablet TAKE 1 TABLET BY MOUTH ONCE DAILY 90 tablet 3 3/8/2024    gabapentin (Neurontin) 300 mg capsule TAKE 1 CAPSULE BY MOUTH ONCE DAILY (Patient taking differently: Take 1 capsule (300 mg) by mouth once daily in the morning.) 90 capsule 3 3/8/2024    gabapentin (Neurontin) 600 mg tablet TAKE 1 TABLET BY MOUTH ONCE DAILY (Patient taking differently: Take 1 tablet (600 mg) by mouth once daily at bedtime.) 90 tablet 3 3/7/2024    galcanezumab (Emgality) 120 mg/mL prefilled syringe INJECT 1 ML UNDER THE SKIN ONCE MONTHLY 3 mL 3     lacosamide (Vimpat) 100 mg tablet TAKE 1 TABLET BY MOUTH TWO TIMES A DAY FOR 30 DAYS 60 each 3     lamoTRIgine (LaMICtal) 100 mg tablet Take 1 tablet (100 mg) by mouth once daily at bedtime.   3/8/2024    loratadine (Claritin) 10 mg tablet Take 1 tablet (10 mg) by mouth once daily.   3/8/2024    losartan (Cozaar) 25 mg tablet TAKE 1 TABLET BY MOUTH ONCE DAILY 90 tablet 3 3/8/2024    macitentan (Opsumit) 10 mg tablet tablet Take 1 tablet (10 mg) by mouth once daily.   3/8/2024    methocarbamol (Robaxin) 750 mg tablet TAKE 1 TABLET BY MOUTH EVERY 8 HOURS 270 tablet 3     metoprolol tartrate (Lopressor) 25 mg tablet Take 2 tablets (50 mg) by mouth 2 times a day. 360 tablet 3 3/8/2024    multivitamin capsule Take 1 capsule by mouth once daily.       omeprazole  (PriLOSEC) 40 mg DR capsule Take 1 capsule every day by oral route.   3/8/2024    potassium chloride CR (Klor-Con) 10 mEq ER tablet Take 1 tablet (10 mEq) by mouth once daily. Do not crush, chew, or split. (Patient not taking: Reported on 3/8/2024) 30 tablet 11 Not Taking    rimegepant (NURTEC) 75 mg tablet,disintegrating DISSOLVE 1 TABLET IN MOUTH ON THE TONGUE EVERY OTHER DAY 96 tablet 0 3/8/2024    rivaroxaban (Xarelto) 10 mg tablet TAKE 1 TABLET BY MOUTH ONCE DAILY WITH FOOD 90 tablet 3 3/8/2024    sucralfate (Carafate) 1 gram tablet TAKE 1 TABLET BY MOUTH EVERY 6 HOURS ON AN EMPTY STOMACH 360 tablet 3 3/8/2024    SUMAtriptan (Imitrex) 100 mg tablet TAKE 1 TABLET BY MOUTH TWO TIMES A DAY AS NEEDED 60 tablet 11 3/8/2024    [DISCONTINUED] selexipag (Uptravi) 1,000 mcg tablet Take 1,600 mcg by mouth 2 times a day. Do not crush, chew, or split.   3/8/2024         Surgical History:    Past Surgical History:   Procedure Laterality Date    CARDIAC CATHETERIZATION N/A 10/31/2023    Procedure: Right Heart Cath With Exercise;  Surgeon: Benjamin Holt MD;  Location: [place] [address] Cardiac Cath Lab;  Service: Cardiovascular;  Laterality: N/A;  iCPET with Bimal Miguel. Needs to be in AM per [place].     SECTION, CLASSIC  2016     Section    GASTRIC BYPASS  2017    Gastric Surgery For Morbid Obesity Bypass With [place]-en-Y    HYSTERECTOMY  2016    Hysterectomy    OTHER SURGICAL HISTORY  2016    Gynecologic Laparoscopy With Adhesiolysis Fallopian Tubes       Allergies:    Allergies   Allergen Reactions    Iodine (Bulk) Anaphylaxis    Iodine Rash       Social History:    Social History     Socioeconomic History    Marital status:      Spouse name: Not on file    Number of children: Not on file    Years of education: Not on file    Highest education level: Not on file   Occupational History    Not on file   Tobacco Use    Smoking status: Never    Smokeless tobacco: Never   Substance and  Sexual Activity    Alcohol use: Not Currently    Drug use: Yes     Types: Marijuana    Sexual activity: Not Currently   Other Topics Concern    Not on file   Social History Narrative    Not on file     Social Determinants of Health     Financial Resource Strain: Medium Risk (3/8/2024)    Overall Financial Resource Strain (CARDIA)     Difficulty of Paying Living Expenses: Somewhat hard   Food Insecurity: Not on file   Transportation Needs: Unmet Transportation Needs (3/8/2024)    PRAPARE - Transportation     Lack of Transportation (Medical): Yes     Lack of Transportation (Non-Medical): No   Physical Activity: Not on file   Stress: Not on file   Social Connections: Not on file   Intimate Partner Violence: Not on file   Housing Stability: High Risk (3/8/2024)    Housing Stability Vital Sign     Unable to Pay for Housing in the Last Year: Yes     Number of Places Lived in the Last Year: 2     Unstable Housing in the Last Year: No       Family History:  No family history on file.    EXAM     Vitals:    Vitals:    03/18/24 1027 03/18/24 1028 03/18/24 1030 03/18/24 1154   BP: (!) 99/48 (!) 100/48 112/57 94/57   BP Location:    Right arm   Pulse: 78 77 84 78   Resp:    18   Temp: 36.7 °C (98.1 °F) 36.7 °C (98.1 °F) 37 °C (98.6 °F) 35.8 °C (96.4 °F)   TempSrc:       SpO2: 94% 96% 90% 96%   Weight:       Height:         Failed to redirect to the Timeline version of the [employer] SmartLink.  No intake or output data in the 24 hours ending 03/18/24 1421      Physical Exam  General: well-nourished, no acute distress  HEENT: EOM intact, no scleral icterus, moist MM  Respiratory: nonlabored breathing on room air  Cardiovascular: RRR, no murmurs/rubs/gallops  Abdomen: Soft, nontender, nondistended, bowel sounds present. No masses palpated  Extremities: no edema, no asterixis  Neuro: alert and oriented, CNII-XII grossly intact, moves all 4 extremities with no focal deficits  Psych: calm and cooperative    OBJECTIVE                                                                               Medications       Current Facility-Administered Medications:     [Held by provider] **Patient supplied medication** cariprazine (Vraylar) capsule 1.5 mg, 1.5 mg, oral, Daily, Jose Pham MD    **Patient supplied medication** riociguat (Adempas) tablet 2.5 mg, 2.5 mg, oral, TID, Juan Daniel Alvarado MD, 2.5 mg at 03/18/24 0802    **Patient supplied** macitentan (Opsumit) tablet tablet 10 mg, 10 mg, oral, Daily, Juan Daniel Alvarado MD, 10 mg at 03/17/24 2223    **Patient supplied**selexipag (Uptravi) tablet 800 mcg, 800 mcg, oral, BID, Reinaldo Cespedes MD, 800 mcg at 03/18/24 0803    acetaminophen (Tylenol) tablet 975 mg, 975 mg, oral, TID, Reinaldo Cespedes MD, 975 mg at 03/18/24 0800    allopurinol (Zyloprim) tablet 100 mg, 100 mg, oral, Daily, Jose Pham MD, 100 mg at 03/18/24 0800    amitriptyline (Elavil) tablet 50 mg, 50 mg, oral, Nightly, Jose Pham MD, 50 mg at 03/17/24 2055    ascorbic acid (Vitamin C) tablet 500 mg, 500 mg, oral, Daily, Jose Pham MD, 500 mg at 03/18/24 0800    benzocaine-menthol (Cepastat Sore Throat) 15-3.6 mg lozenge 1 lozenge, 1 lozenge, Mouth/Throat, q2h PRN, Reinaldo Cespedes MD, 1 lozenge at 03/15/24 1335    diclofenac sodium (Voltaren) 1 % gel 4 g, 4 g, Topical, 4x daily PRN, Radha Simon MD    FLUoxetine (PROzac) capsule 20 mg, 20 mg, oral, Daily, Jose Pham MD, 20 mg at 03/18/24 0800    furosemide (Lasix) injection 40 mg, 40 mg, intravenous, Once, Radha Simon MD    gabapentin (Neurontin) capsule 300 mg, 300 mg, oral, q AM, Jose Pham MD, 300 mg at 03/18/24 0800    gabapentin (Neurontin) capsule 600 mg, 600 mg, oral, Nightly, Jose Pham MD, 600 mg at 03/17/24 2052    ipratropium-albuteroL (Duo-Neb) 0.5-2.5 mg/3 mL nebulizer solution 3 mL, 3 mL, nebulization, TID, Reza Garay MD, 3 mL at 03/18/24 0957    lacosamide (Vimpat) tablet 100 mg, 100 mg, oral, BID, Jose Pham MD, 100 mg at 03/18/24 0800    lamoTRIgine  (LaMICtal) tablet 100 mg, 100 mg, oral, Daily, Jose Pham MD, 100 mg at 03/18/24 0800    lidocaine 4 % patch 1 patch, 1 patch, transdermal, Daily, Radha Simon MD    magnesium oxide (Mag-Ox) tablet 400 mg, 400 mg, oral, Daily, Reza Garay MD, 400 mg at 03/18/24 0800    metoprolol tartrate (Lopressor) tablet 50 mg, 50 mg, oral, BID, Reinaldo Cespedes MD, 50 mg at 03/17/24 0851    ondansetron (Zofran) tablet 4 mg, 4 mg, oral, q4h PRN, Reinaldo Cespedes MD, 4 mg at 03/18/24 1210    oxygen (O2) therapy, , inhalation, Continuous PRN - O2/gases, Reinaldo Cespedes MD, 5 L/min at 03/18/24 0957    pantoprazole (ProtoNix) EC tablet 40 mg, 40 mg, oral, Daily before breakfast, Jose Pham MD, 40 mg at 03/18/24 0611    rivaroxaban (Xarelto) tablet 10 mg, 10 mg, oral, Daily, Jose Pham MD, 10 mg at 03/18/24 0800    sucralfate (Carafate) tablet 1 g, 1 g, oral, q6h SONIA, Jose Pham MD, 1 g at 03/18/24 1210    SUMAtriptan (Imitrex) tablet 100 mg, 100 mg, oral, BID PRN, Jose Pham MD, 100 mg at 03/13/24 2043                                                                            Labs     Results for orders placed or performed during the hospital encounter of 03/07/24 (from the past 24 hour(s))   Renal Function Panel   Result Value Ref Range    Glucose 77 74 - 99 mg/dL    Sodium 139 136 - 145 mmol/L    Potassium 4.0 3.5 - 5.3 mmol/L    Chloride 102 98 - 107 mmol/L    Bicarbonate 25 21 - 32 mmol/L    Anion Gap 16 10 - 20 mmol/L    Urea Nitrogen 13 6 - 23 mg/dL    Creatinine 0.62 0.50 - 1.05 mg/dL    eGFR >90 >60 mL/min/1.73m*2    Calcium 8.3 (L) 8.6 - 10.6 mg/dL    Phosphorus 4.1 2.5 - 4.9 mg/dL    Albumin 3.6 3.4 - 5.0 g/dL   Magnesium   Result Value Ref Range    Magnesium 2.32 1.60 - 2.40 mg/dL                                                                            Imaging           No imaging available for review.                                                                         GI Procedures     EGD  2017  Impression:            - Normal esophagus.                         - Z-line regular, 39 cm from the incisors.                         - Gastric bypass with a pouch 6 cm in length and                          intact staple line. Gastrojejunal anastomosis                          characterized by ulceration in jejunum and an intact                          staple line. no candy cane. no bile reflux. multiple                          superficial ulcers seen                         - Multiple non-bleeding jejunal ulcers with no                          stigmata of bleeding Biopsied.     ASSESSMENT / PLAN                  ASSESSMENT/PLAN:  Jazmin Hein is a 49 y.o. female admitted on 3/7/2024 with severe pulmonary hypertension on chronic O2 and multiple vasodilator medications who presented with diarrhea and nausea/ vomiting. GI is consulted for persistent nausea.  This is most likely related to selexipag, which causes nausea in up to 33% of patients. Patient states that these symptoms have been present since she started this medication but became intractable when her dose was increased. It is possible that with by decreasing the dose, her symptoms may improve, but this may take time. In the meantime, patient would benefit from supportive care.    Recommendations:  -Use zofran with caution. If risks accepted, can discharge with 4mg SL PRN.  -Would avoid other typical anti-emetics due to the risk of further QTC prolongation  -For rescue, or intractable nausea/ vomiting with inability to tolerate PO, can consider ativan as needed.  -Low residue diet.  -Consider imodium PRN for diarrhea.  -Supportive care per primary team.      Patient was discussed with Dr. Priest.    Recommendations communicated with the primary team via secure chat.  Thank you for the consultation.  GI will continue to follow.  Please do not hesitate to contact us again on Epic Chat or by paging the consultation team at [phone] between the  weekday hours of 7 AM - 5 PM.  If there is an urgent concern during the weekend, after-hours, or holidays; then please page the on-call GI fellow at [phone]. Thank you.      Tania Kern MD  Gastroenterology and Hepatology Fellow  [facility]

## 2024-03-18 NOTE — PROGRESS NOTES
"48 y/o F PMH group 1 pulmonary arterial hypertension on Riociguat, Macitentan, and Selexipag presenting for jaw pain, poor PO intake, and diarrhea consistent with side effects of uptitration of her Selexipag. Weaned Selexipag to 800 mcg BID and diuresing.    Subjective      Overnight, no acute events. This morning, she says her breathing is stable from prior but just about back to her baseline. She was however very nauseous after eating lunch yesterday with only having had the zofran 30 minutes or less before the meal. She mentions that when she is able to get zofran 60-90 minutes before a meal she is not nauseous. She has NOT noticed that the nausea/vomiting improves with changing of the types of food that she eats.     Objective   Last Recorded Vitals  BP 94/57 (BP Location: Right arm)   Pulse 78   Temp 35.8 °C (96.4 °F)   Resp 18   Ht 1.626 m (5' 4\")   Wt 109 kg (239 lb 13.8 oz)   SpO2 96%   BMI 41.17 kg/m²      Intake/Output last 3 Shifts:  No intake or output data in the 24 hours ending 03/18/24 1351       Physical Exam  General appearance/Constitutional: no acute distress  Eyes: sclera anicteric  Head & Neck: moist mucous membranes  Respiratory/Chest Wall: clear breath sounds bilaterally, on 5L NC  Cardiovascular: regular rate and rhythm, warm extremities  Neurologic: awake, alert  Extremities: no lower extremity edema  Psychiatric: appropriate mood  Lines/Drains: 20 gauge pIV L forearm    Scheduled medications  [Held by provider] cariprazine, 1.5 mg, oral, Daily  riociguat, 2.5 mg, oral, TID  macitentan, 10 mg, oral, Daily  selexipag, 800 mcg, oral, BID  acetaminophen, 975 mg, oral, TID  allopurinol, 100 mg, oral, Daily  amitriptyline, 50 mg, oral, Nightly  ascorbic acid, 500 mg, oral, Daily  FLUoxetine, 20 mg, oral, Daily  gabapentin, 300 mg, oral, q AM  gabapentin, 600 mg, oral, Nightly  ipratropium-albuteroL, 3 mL, nebulization, TID  lacosamide, 100 mg, oral, BID  lamoTRIgine, 100 mg, oral, " Daily  lidocaine, 1 patch, transdermal, Daily  magnesium oxide, 400 mg, oral, Daily  metoprolol tartrate, 50 mg, oral, BID  pantoprazole, 40 mg, oral, Daily before breakfast  rivaroxaban, 10 mg, oral, Daily  sucralfate, 1 g, oral, q6h SONIA      Continuous medications     PRN medications  PRN medications: benzocaine-menthol, diclofenac sodium, ondansetron, oxygen, SUMAtriptan     Relevant Results     Lab Results   Component Value Date    GLUCOSE 77 03/18/2024    CALCIUM 8.3 (L) 03/18/2024     03/18/2024    K 4.0 03/18/2024    CO2 25 03/18/2024     03/18/2024    BUN 13 03/18/2024    CREATININE 0.62 03/18/2024      Assessment and Plan by Problem:   Assessment and Plan by Problem:   50 y/o F PMH SVT/PACs/PVCs s/p PPM (2019), bipolar disorder, TBI complicated by chronic migraines, and 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 Weaned Selexipag to 800 mcg BID and diuresing.    Updates 3/18  - Weight today 109 kg from 107 kg on 3/14/14  - s/p 40 mg IV Lasix x1 yesterday with 1.2 L out.   - orthostatic vitals were negative today. 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 Interventricular septum is flattened in systole, consistent with right ventricular pressure overload. Pseudeonormal LV diastolic filling. LA moderately dilated. Normal RV global systolic function, RA severely dilated. Mild tricuspid regurgitation. RVSP severely elevated 58.2 mmHg.   - Home regimen prior to admission was Adempas 2.5 mg TID, Macitentan 10 mg daily, Selexipag 1600 mcg BID  - Pt presented with 10 lbs weight loss, diarrhea, vomiting, poor PO intake, myalgia consistent with side effects of Selexipag. CK normal on admission.  - Initially was hypovolemic so supported with IV fluids, now taking in better PO and trying to optimize diuresis  - Selexipag decreased to 800 mcg BID on 3/14/14 per Dr. Johnson  - Last EKG 3/13/24 with QTc 458 msec  - In last 24 hours, had 2.5L urine outptut with 40 mg IV Lasix x 1 on 3/16  - Weight on 3/17 108 kg from 107 kg on 3/14/14  - Pt having intermittent episodes of nausea, intermittently with emesis- taking Zofran prior to each meal.    Plan:  - 40 mg IV Lasix today  - Dry weight seems to be about 108 kg  - Walking pulse ox today  - Continue Selexipag 800 mcg BID on discharge  - Continue home Adempas 2.5 mg TID and Macitentan 10 mg daily for pulmonary hypertension  - Continue home Rivaroxaban 10 mg daily  - Continue DuoNebs TID scheduled for now, can do PRN on discharge  - Continue Zofran 4 mg PO q4 PRN while inpt- would discharge with 4 mg PO q6 PRN  - Hold home Lasix 40 mg PO daily while diuresing with IV medication- will need to determine homegoing Lasix dose  - Will discharge with instructions to weigh herself daily and take an extra dose of diuretics if she gains 3 lbs in 2 days or 5 lbs in 1 week  - Ordered nebulizer for her as she feels DuoNebs are helping her breathe- will also send home with DuoNebs PRN  - RT consult for CPAP qHS  - Follow up with Dr. Johnson  scheduled for 3/20/2024    #Migraines  - Continue home Sumatriptan 100 mg BID PRN and Amitriptyline 50 mg qHS  - Hold home, Butalbital-acetaminophen  mg capsule q4 PRN for migraines, Galcanezumab 120 mg/mL monthly injection, Rimegepant 75 mg ODT every other day while inpt    #Bipolar disorder  - Continue home Fluoxetine 20 mg daily, Lacosamide 100 mg BID, Lamotrigine 100 mg daily  - Hold home Cariprazine 1.5 mg daily while inpt    #Peripheral neuropathy  - Continue home Gabapentin 300 mg daily and Gabapentin 600 mg qHS, did not use home Methocarbamol 750 mg q8 PRN when ordered so will d/c order while inpt but OK to use on discharge    #SVT/PACs/PVCs s/p PPM (2019)  #History of HTN  - Hold home Losartan 25 mg daily for now while blood pressures are low- may hold on discharge depending on how she does  - Continue home Metoprolol Tartrate 50 mg BID    #History of gastric ulcers  - Seen on EGD 4/2018  - Continue home Omeprazole 40 mg daily as Pantoprazole 40 mg daily while inpt due to formulary  - Continue home Sucralfate 1g q6 scheduled    MISC:  - Continue home Allopurinol 100 mg daily, Ascorbic acid 500 mg daily  - Hold home Vitamin D 28433 units weekly, Loratadine 10 mg daily, Multivitamin, and KCl 10 mEq daily for now- can resume on discharge     Fluids: none indicated  Nutrition: regular diet, 2L fluid restriction  Access: 20 gauge pIV L forearm  DVT ppx: on home Rivaroxaban 10 mg daily  GI ppx: continuing home Omeprazole 40 mg daily as Pantoprazole 40 mg daily while inpt due to formulary  Dispo: TBD, likely home with no needs on 3/18  Surrogate Decision Maker if Needed:  (Addi) [phone]  CODE STATUS: DNAR/DNI    Lynda Whimtan MD  PGY3 Internal Medicine

## 2024-03-18 NOTE — PROGRESS NOTES
3/18/24 1203 Transitional Care Coordinator Notes:    Patient is not medically ready for discharge. Patient has been having episodes of emesis.    Revised home oxygen order sent to [employer]. The agency will deliver the oxygen tank to the bedside.                       Assessment/Plan   Principal Problem:    Allergic reaction to drug, initial encounter    Discharge Plans: discharge to home           Paola Nash RN

## 2024-03-18 NOTE — CARE PLAN
Problem: Discharge Planning  Goal: Discharge to home or other facility with appropriate resources  Outcome: Progressing     Problem: Chronic Conditions and Co-morbidities  Goal: Patient's chronic conditions and co-morbidity symptoms are monitored and maintained or improved  Outcome: Progressing     Problem: Nutrition  Goal: Oral intake greater than 50%  Outcome: Progressing     Problem: Respiratory  Goal: Clear secretions with interventions this shift  Outcome: Progressing  Goal: Wean oxygen to maintain O2 saturation per order/standard this shift  Outcome: Progressing     Problem: Pain  Goal: Takes deep breaths with improved pain control throughout the shift  Outcome: Progressing   The patient's goals for the shift include      The clinical goals for the shift include pt will maintain O2 Sats walking    Over the shift, the patient did not make progress toward the following goals. Barriers to progression include disease process. Recommendations to address these barriers include continue med treatment.

## 2024-03-18 NOTE — CARE PLAN
Problem: Discharge Planning  Goal: Discharge to home or other facility with appropriate resources  Outcome: Progressing     Problem: Chronic Conditions and Co-morbidities  Goal: Patient's chronic conditions and co-morbidity symptoms are monitored and maintained or improved  Outcome: Progressing     Problem: Nutrition  Goal: Oral intake greater than 50%  Outcome: Progressing  Goal: Consume prescribed supplement  Outcome: Progressing     Problem: Respiratory  Goal: Clear secretions with interventions this shift  Outcome: Progressing  Goal: Minimize anxiety/maximize coping throughout shift  Outcome: Progressing     Problem: Pain  Goal: Takes deep breaths with improved pain control throughout the shift  Outcome: Progressing  Goal: Turns in bed with improved pain control throughout the shift  Outcome: Progressing  Goal: Walks with improved pain control throughout the shift  Outcome: Progressing   The patient's goals for the shift include Patient will have decrease episodes of nausea    The clinical goals for the shift include Patient will not have any episodes of nausea

## 2024-03-19 ENCOUNTER — APPOINTMENT (OUTPATIENT)
Dept: CARDIOLOGY | Facility: CLINIC | Age: 49
End: 2024-03-19
Payer: COMMERCIAL

## 2024-03-19 ENCOUNTER — APPOINTMENT (OUTPATIENT)
Dept: CARDIOLOGY | Facility: HOSPITAL | Age: 49
DRG: 556 | End: 2024-03-19
Payer: COMMERCIAL

## 2024-03-19 LAB
ALBUMIN SERPL BCP-MCNC: 3.5 G/DL (ref 3.4–5)
ANION GAP SERPL CALC-SCNC: 14 MMOL/L (ref 10–20)
ATRIAL RATE: 77 BPM
BASOPHILS # BLD AUTO: 0.02 X10*3/UL (ref 0–0.1)
BASOPHILS NFR BLD AUTO: 0.6 %
BUN SERPL-MCNC: 12 MG/DL (ref 6–23)
CALCIUM SERPL-MCNC: 8.3 MG/DL (ref 8.6–10.6)
CHLORIDE SERPL-SCNC: 102 MMOL/L (ref 98–107)
CO2 SERPL-SCNC: 28 MMOL/L (ref 21–32)
CREAT SERPL-MCNC: 0.63 MG/DL (ref 0.5–1.05)
EGFRCR SERPLBLD CKD-EPI 2021: >90 ML/MIN/1.73M*2
EOSINOPHIL # BLD AUTO: 0.12 X10*3/UL (ref 0–0.7)
EOSINOPHIL NFR BLD AUTO: 3.6 %
ERYTHROCYTE [DISTWIDTH] IN BLOOD BY AUTOMATED COUNT: 13.8 % (ref 11.5–14.5)
GLUCOSE SERPL-MCNC: 88 MG/DL (ref 74–99)
HCT VFR BLD AUTO: 30.5 % (ref 36–46)
HGB BLD-MCNC: 9.8 G/DL (ref 12–16)
IMM GRANULOCYTES # BLD AUTO: 0.02 X10*3/UL (ref 0–0.7)
IMM GRANULOCYTES NFR BLD AUTO: 0.6 % (ref 0–0.9)
LYMPHOCYTES # BLD AUTO: 1.17 X10*3/UL (ref 1.2–4.8)
LYMPHOCYTES NFR BLD AUTO: 35 %
MAGNESIUM SERPL-MCNC: 2.04 MG/DL (ref 1.6–2.4)
MCH RBC QN AUTO: 27 PG (ref 26–34)
MCHC RBC AUTO-ENTMCNC: 32.1 G/DL (ref 32–36)
MCV RBC AUTO: 84 FL (ref 80–100)
MONOCYTES # BLD AUTO: 0.31 X10*3/UL (ref 0.1–1)
MONOCYTES NFR BLD AUTO: 9.3 %
NEUTROPHILS # BLD AUTO: 1.7 X10*3/UL (ref 1.2–7.7)
NEUTROPHILS NFR BLD AUTO: 50.9 %
NRBC BLD-RTO: 0 /100 WBCS (ref 0–0)
P AXIS: 20 DEGREES
P OFFSET: 172 MS
P ONSET: 121 MS
PHOSPHATE SERPL-MCNC: 4.4 MG/DL (ref 2.5–4.9)
PLATELET # BLD AUTO: 262 X10*3/UL (ref 150–450)
POTASSIUM SERPL-SCNC: 3.8 MMOL/L (ref 3.5–5.3)
PR INTERVAL: 198 MS
Q ONSET: 220 MS
QRS COUNT: 13 BEATS
QRS DURATION: 88 MS
QT INTERVAL: 444 MS
QTC CALCULATION(BAZETT): 502 MS
QTC FREDERICIA: 482 MS
R AXIS: 97 DEGREES
RBC # BLD AUTO: 3.63 X10*6/UL (ref 4–5.2)
SODIUM SERPL-SCNC: 140 MMOL/L (ref 136–145)
T AXIS: 43 DEGREES
T OFFSET: 442 MS
VENTRICULAR RATE: 77 BPM
WBC # BLD AUTO: 3.3 X10*3/UL (ref 4.4–11.3)

## 2024-03-19 PROCEDURE — 2500000005 HC RX 250 GENERAL PHARMACY W/O HCPCS

## 2024-03-19 PROCEDURE — 93005 ELECTROCARDIOGRAM TRACING: CPT

## 2024-03-19 PROCEDURE — 2500000002 HC RX 250 W HCPCS SELF ADMINISTERED DRUGS (ALT 637 FOR MEDICARE OP, ALT 636 FOR OP/ED)

## 2024-03-19 PROCEDURE — 2500000001 HC RX 250 WO HCPCS SELF ADMINISTERED DRUGS (ALT 637 FOR MEDICARE OP)

## 2024-03-19 PROCEDURE — 2500000002 HC RX 250 W HCPCS SELF ADMINISTERED DRUGS (ALT 637 FOR MEDICARE OP, ALT 636 FOR OP/ED): Performed by: STUDENT IN AN ORGANIZED HEALTH CARE EDUCATION/TRAINING PROGRAM

## 2024-03-19 PROCEDURE — 2500000004 HC RX 250 GENERAL PHARMACY W/ HCPCS (ALT 636 FOR OP/ED)

## 2024-03-19 PROCEDURE — 36415 COLL VENOUS BLD VENIPUNCTURE: CPT

## 2024-03-19 PROCEDURE — 99232 SBSQ HOSP IP/OBS MODERATE 35: CPT | Performed by: STUDENT IN AN ORGANIZED HEALTH CARE EDUCATION/TRAINING PROGRAM

## 2024-03-19 PROCEDURE — 83735 ASSAY OF MAGNESIUM: CPT

## 2024-03-19 PROCEDURE — 85025 COMPLETE CBC W/AUTO DIFF WBC: CPT

## 2024-03-19 PROCEDURE — 93010 ELECTROCARDIOGRAM REPORT: CPT | Performed by: INTERNAL MEDICINE

## 2024-03-19 PROCEDURE — 99233 SBSQ HOSP IP/OBS HIGH 50: CPT | Performed by: STUDENT IN AN ORGANIZED HEALTH CARE EDUCATION/TRAINING PROGRAM

## 2024-03-19 PROCEDURE — 1200000002 HC GENERAL ROOM WITH TELEMETRY DAILY

## 2024-03-19 PROCEDURE — 94640 AIRWAY INHALATION TREATMENT: CPT

## 2024-03-19 PROCEDURE — 80069 RENAL FUNCTION PANEL: CPT

## 2024-03-19 PROCEDURE — 2500000001 HC RX 250 WO HCPCS SELF ADMINISTERED DRUGS (ALT 637 FOR MEDICARE OP): Performed by: STUDENT IN AN ORGANIZED HEALTH CARE EDUCATION/TRAINING PROGRAM

## 2024-03-19 RX ORDER — FUROSEMIDE 10 MG/ML
40 INJECTION INTRAMUSCULAR; INTRAVENOUS ONCE
Status: COMPLETED | OUTPATIENT
Start: 2024-03-19 | End: 2024-03-19

## 2024-03-19 RX ORDER — POTASSIUM CHLORIDE 20 MEQ/1
20 TABLET, EXTENDED RELEASE ORAL ONCE
Status: COMPLETED | OUTPATIENT
Start: 2024-03-19 | End: 2024-03-19

## 2024-03-19 RX ADMIN — FUROSEMIDE 40 MG: 10 INJECTION, SOLUTION INTRAVENOUS at 18:06

## 2024-03-19 RX ADMIN — IPRATROPIUM BROMIDE AND ALBUTEROL SULFATE 3 ML: .5; 3 SOLUTION RESPIRATORY (INHALATION) at 20:06

## 2024-03-19 RX ADMIN — ONDANSETRON HYDROCHLORIDE 4 MG: 4 TABLET, FILM COATED ORAL at 12:07

## 2024-03-19 RX ADMIN — FLUOXETINE 20 MG: 10 CAPSULE ORAL at 09:41

## 2024-03-19 RX ADMIN — LACOSAMIDE 100 MG: 100 TABLET, FILM COATED ORAL at 21:34

## 2024-03-19 RX ADMIN — SUCRALFATE 1 G: 1 TABLET ORAL at 18:06

## 2024-03-19 RX ADMIN — RIVAROXABAN 10 MG: 20 TABLET, FILM COATED ORAL at 09:40

## 2024-03-19 RX ADMIN — RIOCIGUAT 2.5 MG: 2.5 TABLET, FILM COATED ORAL at 15:19

## 2024-03-19 RX ADMIN — SUCRALFATE 1 G: 1 TABLET ORAL at 06:25

## 2024-03-19 RX ADMIN — PANTOPRAZOLE SODIUM 40 MG: 40 TABLET, DELAYED RELEASE ORAL at 06:25

## 2024-03-19 RX ADMIN — IPRATROPIUM BROMIDE AND ALBUTEROL SULFATE 3 ML: .5; 3 SOLUTION RESPIRATORY (INHALATION) at 08:00

## 2024-03-19 RX ADMIN — Medication 400 MG: at 09:41

## 2024-03-19 RX ADMIN — Medication 1 LOZENGE: at 09:40

## 2024-03-19 RX ADMIN — ALLOPURINOL 100 MG: 100 TABLET ORAL at 09:41

## 2024-03-19 RX ADMIN — LAMOTRIGINE 100 MG: 100 TABLET ORAL at 09:41

## 2024-03-19 RX ADMIN — AMITRIPTYLINE HYDROCHLORIDE 50 MG: 50 TABLET, FILM COATED ORAL at 21:35

## 2024-03-19 RX ADMIN — ACETAMINOPHEN 975 MG: 325 TABLET ORAL at 09:40

## 2024-03-19 RX ADMIN — ONDANSETRON HYDROCHLORIDE 4 MG: 4 TABLET, FILM COATED ORAL at 06:25

## 2024-03-19 RX ADMIN — SELEXIPAG 600 MCG: 200 TABLET, COATED ORAL at 21:36

## 2024-03-19 RX ADMIN — RIOCIGUAT 2.5 MG: 2.5 TABLET, FILM COATED ORAL at 09:41

## 2024-03-19 RX ADMIN — SELEXIPAG 600 MCG: 200 TABLET, COATED ORAL at 09:41

## 2024-03-19 RX ADMIN — ONDANSETRON HYDROCHLORIDE 4 MG: 4 TABLET, FILM COATED ORAL at 18:06

## 2024-03-19 RX ADMIN — ACETAMINOPHEN 975 MG: 325 TABLET ORAL at 21:34

## 2024-03-19 RX ADMIN — MACITENTAN 10 MG: 10 TABLET, FILM COATED ORAL at 21:35

## 2024-03-19 RX ADMIN — GABAPENTIN 300 MG: 300 CAPSULE ORAL at 09:41

## 2024-03-19 RX ADMIN — METOPROLOL TARTRATE 50 MG: 50 TABLET, FILM COATED ORAL at 09:40

## 2024-03-19 RX ADMIN — RIOCIGUAT 2.5 MG: 2.5 TABLET, FILM COATED ORAL at 21:35

## 2024-03-19 RX ADMIN — IPRATROPIUM BROMIDE AND ALBUTEROL SULFATE 3 ML: .5; 3 SOLUTION RESPIRATORY (INHALATION) at 15:59

## 2024-03-19 RX ADMIN — OXYCODONE HYDROCHLORIDE AND ACETAMINOPHEN 500 MG: 500 TABLET ORAL at 09:41

## 2024-03-19 RX ADMIN — ACETAMINOPHEN 975 MG: 325 TABLET ORAL at 15:19

## 2024-03-19 RX ADMIN — LACOSAMIDE 100 MG: 100 TABLET, FILM COATED ORAL at 09:41

## 2024-03-19 RX ADMIN — GABAPENTIN 600 MG: 300 CAPSULE ORAL at 21:34

## 2024-03-19 RX ADMIN — POTASSIUM CHLORIDE 20 MEQ: 1500 TABLET, EXTENDED RELEASE ORAL at 18:06

## 2024-03-19 RX ADMIN — SUCRALFATE 1 G: 1 TABLET ORAL at 12:07

## 2024-03-19 RX ADMIN — METOPROLOL TARTRATE 50 MG: 50 TABLET, FILM COATED ORAL at 21:34

## 2024-03-19 ASSESSMENT — COGNITIVE AND FUNCTIONAL STATUS - GENERAL
MOBILITY SCORE: 23
MOBILITY SCORE: 23
DAILY ACTIVITIY SCORE: 24
DAILY ACTIVITIY SCORE: 24
MOBILITY SCORE: 23
CLIMB 3 TO 5 STEPS WITH RAILING: A LITTLE
CLIMB 3 TO 5 STEPS WITH RAILING: A LITTLE
DAILY ACTIVITIY SCORE: 24
CLIMB 3 TO 5 STEPS WITH RAILING: A LITTLE

## 2024-03-19 NOTE — CARE PLAN
Problem: Nutrition  Goal: Electrolytes WNL  Outcome: Progressing     Problem: Respiratory  Goal: Minimize anxiety/maximize coping throughout shift  Outcome: Progressing     Problem: Respiratory  Goal: Wean oxygen to maintain O2 saturation per order/standard this shift  Outcome: Progressing       The clinical goals for the shift include Pt's pulse ox will remain >92% throughout the shift.

## 2024-03-19 NOTE — PROGRESS NOTES
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    Subjective      Overnight, no acute events. This morning, overall stable     Objective   Last Recorded Vitals  BP (!) 95/46   Pulse 69   Temp 36.6 °C (97.9 °F)   Resp 16   Ht 1.626 m (5' 4\")   Wt 109 kg (239 lb 13.8 oz)   SpO2 96%   BMI 41.17 kg/m²      Intake/Output last 3 Shifts:    Intake/Output Summary (Last 24 hours) at 3/19/2024 1440  Last data filed at 3/19/2024 1213  Gross per 24 hour   Intake 580 ml   Output 3200 ml   Net -2620 ml          Physical Exam  General appearance/Constitutional: no acute distress  Eyes: sclera anicteric  Head & Neck: moist mucous membranes  Respiratory/Chest Wall: clear breath sounds bilaterally, on 5L NC  Cardiovascular: regular rate and rhythm, warm extremities  Neurologic: awake, alert  Extremities: no lower extremity edema  Psychiatric: appropriate mood  Lines/Drains: 20 gauge pIV L forearm    Scheduled medications  selexipag, 600 mcg, oral, BID  [Held by provider] cariprazine, 1.5 mg, oral, Daily  riociguat, 2.5 mg, oral, TID  macitentan, 10 mg, oral, Daily  acetaminophen, 975 mg, oral, TID  allopurinol, 100 mg, oral, Daily  amitriptyline, 50 mg, oral, Nightly  ascorbic acid, 500 mg, oral, Daily  FLUoxetine, 20 mg, oral, Daily  gabapentin, 300 mg, oral, q AM  gabapentin, 600 mg, oral, Nightly  ipratropium-albuteroL, 3 mL, nebulization, TID  lacosamide, 100 mg, oral, BID  lamoTRIgine, 100 mg, oral, Daily  lidocaine, 1 patch, transdermal, Daily  magnesium oxide, 400 mg, oral, Daily  metoprolol tartrate, 50 mg, oral, BID  pantoprazole, 40 mg, oral, Daily before breakfast  rivaroxaban, 10 mg, oral, " Daily  sucralfate, 1 g, oral, q6h SONIA      Continuous medications     PRN medications  PRN medications: benzocaine-menthol, diclofenac sodium, ondansetron, oxygen, SUMAtriptan     Relevant Results     Lab Results   Component Value Date    GLUCOSE 88 03/19/2024    CALCIUM 8.3 (L) 03/19/2024     03/19/2024    K 3.8 03/19/2024    CO2 28 03/19/2024     03/19/2024    BUN 12 03/19/2024    CREATININE 0.63 03/19/2024      Assessment and Plan by Problem:   50 y/o F PMH [place]-en-Y gastric bypass with gastric ulcers (seen on EGD 4/2018), SVT/PACs/PVCs s/p PPM (2019), bipolar disorder, TBI complicated by chronic migraines, and 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    Updates 3/19  - s/p 40 mg IV Lasix x1 yesterday with 2 L out.   - Need to determine final diuretic homegoing plan  - EKG with further increase in Qtc to 502 from 496 yesterday  - may restart home oral diuresis today of 40 lasix PO vs. Continue IV diuresis.       #Diarrhea, myalgia, nausea, vomiting likely secondary to increasing dose of Selexipag as outpt  #Chronic hypoxic respiratory failure secondary to pulmonary arterial hypertension with possible component of CTEPH (on 4L NC at rest)  #History of recurrent PE (unprovoked in 2014, recurred after stopping anticoagulation, on Rivaroxaban at home)  #ELENA (on CPAP qHS)  - Last TTE 3/12/2024: EF 60-65%, LV normal without regional wall motion abnormalities. Interventricular septum is flattened in systole, consistent with right ventricular pressure overload. Pseudeonormal LV diastolic filling. LA moderately dilated. Normal RV global systolic function, RA severely dilated. Mild tricuspid regurgitation. RVSP severely elevated 58.2 mmHg.   - Home regimen prior to admission was Adempas 2.5 mg TID, Macitentan 10 mg daily, Selexipag 1600 mcg BID  -  Pt presented with 10 lbs weight loss, diarrhea, vomiting, poor PO intake, myalgia consistent with side effects of Selexipag. CK normal on admission.  - Initially was hypovolemic so supported with IV fluids, now taking in better PO and trying to optimize diuresis  - Selexipag decreased to 800 mcg BID on 3/14/14 per Dr. Johnson and further decreased to 600 mcg BID on 3/18/24 again per Dr. Johnson  - Last EKG 3/19/24 with QTc 502 msec  - In last 24 hours, had 2.5L urine outptut with 40 mg IV Lasix x 1 on 3/16  - Weight on 3/17 108 kg from 107 kg on 3/14/14  - Pt having intermittent episodes of nausea, intermittently with emesis- taking Zofran prior to each meal.    Plan:  - 40 mg IV Lasix today  - Dry weight seems to be about 108 kg  - Walking pulse ox today  - Continue Selexipag 800 mcg BID on discharge  - Continue home Adempas 2.5 mg TID and Macitentan 10 mg daily for pulmonary hypertension  - Continue home Rivaroxaban 10 mg daily  - Continue DuoNebs TID scheduled for now, can do PRN on discharge  - Continue Zofran 4 mg PO q4 PRN while inpt- would discharge with 4 mg PO q6 PRN  - Hold home Lasix 40 mg PO daily while diuresing with IV medication- will need to determine homegoing Lasix dose  - Will discharge with instructions to weigh herself daily and take an extra dose of diuretics if she gains 3 lbs in 2 days or 5 lbs in 1 week  - Ordered nebulizer for her as she feels DuoNebs are helping her breathe- will also send home with DuoNebs PRN  - RT consult for CPAP qHS  - Follow up with Dr. Johnson scheduled for 3/20/2024    #Migraines  - Continue home Sumatriptan 100 mg BID PRN and Amitriptyline 50 mg qHS  - Hold home, Butalbital-acetaminophen  mg capsule q4 PRN for migraines, Galcanezumab 120 mg/mL monthly injection, Rimegepant 75 mg ODT every other day while inpt    #Bipolar disorder  - Continue home Fluoxetine 20 mg daily, Lacosamide 100 mg BID, Lamotrigine 100 mg daily  - Hold home Cariprazine 1.5 mg daily while  inpt    #Peripheral neuropathy  - Continue home Gabapentin 300 mg daily and Gabapentin 600 mg qHS, did not use home Methocarbamol 750 mg q8 PRN when ordered so will d/c order while inpt but OK to use on discharge    #SVT/PACs/PVCs s/p PPM (2019)  #History of HTN  - Hold home Losartan 25 mg daily for now while blood pressures are low- may hold on discharge depending on how she does  - Continue home Metoprolol Tartrate 50 mg BID    #History of gastric ulcers  - Seen on EGD 4/2018  - Continue home Omeprazole 40 mg daily as Pantoprazole 40 mg daily while inpt due to formulary  - Continue home Sucralfate 1g q6 scheduled    MISC:  - Continue home Allopurinol 100 mg daily, Ascorbic acid 500 mg daily  - Hold home Vitamin D 04231 units weekly, Loratadine 10 mg daily, Multivitamin, and KCl 10 mEq daily for now- can resume on discharge     Fluids: none indicated  Nutrition: regular diet, 2L fluid restriction  Access: 20 gauge pIV L forearm  DVT ppx: on home Rivaroxaban 10 mg daily  GI ppx: continuing home Omeprazole 40 mg daily as Pantoprazole 40 mg daily while inpt due to formulary  Dispo: TBD, likely home with no needs on 3/18  Surrogate Decision Maker if Needed:  (Addi) [phone]  CODE STATUS: DNAR/DNI    Lynda Whitman MD  PGY3 Internal Medicine

## 2024-03-19 NOTE — PROGRESS NOTES
"[facility]  [facility] [place]  CONSULT FOLLOW-UP     Reason For Consult: nausea/ vomiting    History Of Present Illness  Jazmin Hein is a 49 y.o. female with a past medical history of pulmonary hypertension (group 1) admitted on 3/7/2024 with poor PO intake, dehydration and diarrhea and nausea/ vomiting. GI is consulted for persistent nausea.   SUBJECTIVE     No acute events overnight. Patient is still nauseous, but she is tolerating PO if she takes zofran 60-90 minutes before meals.    EXAM     Last Recorded Vitals  Blood pressure 107/61, pulse 90, temperature 36.4 °C (97.5 °F), temperature source Oral, resp. rate 18, height 1.626 m (5' 4\"), weight 109 kg (239 lb 13.8 oz), SpO2 95 %.      Intake/Output Summary (Last 24 hours) at 3/19/2024 1532  Last data filed at 3/19/2024 1213  Gross per 24 hour   Intake 580 ml   Output 3200 ml   Net -2620 ml      Physical Exam  General: well-nourished, no acute distress  HEENT: EOM intact, no scleral icterus, moist MM  Respiratory: nonlabored breathing on nasal cannula  Cardiovascular: RRR, no murmurs/rubs/gallops  Abdomen: Obese, soft, nontender, nondistended.  Extremities: no edema, no asterixis  Neuro: alert and oriented, CNII-XII grossly intact, moves all 4 extremities with no focal deficits  Psych: calm and cooperative    OBJECTIVE                                                                              Medications             Current Facility-Administered Medications:     **patient home med**selexipag (Uptravi) tablet 600 mcg, 600 mcg, oral, BID, Lynda Whitman MD, 600 mcg at 03/19/24 0941    [Held by provider] **Patient supplied medication** cariprazine (Vraylar) capsule 1.5 mg, 1.5 mg, oral, Daily, Jose Pham MD    **Patient supplied medication** riociguat (Adempas) tablet 2.5 mg, 2.5 mg, oral, TID, Juan Daniel Alvarado MD, 2.5 mg at 03/19/24 1386    **Patient supplied** macitentan (Opsumit) tablet tablet 10 mg, 10 mg, " oral, Daily, Juan Daniel Alvaraod MD, 10 mg at 03/18/24 2132    acetaminophen (Tylenol) tablet 975 mg, 975 mg, oral, TID, Reinaldo Cespedes MD, 975 mg at 03/19/24 1519    allopurinol (Zyloprim) tablet 100 mg, 100 mg, oral, Daily, Jose Pham MD, 100 mg at 03/19/24 0941    amitriptyline (Elavil) tablet 50 mg, 50 mg, oral, Nightly, Jose Pham MD, 50 mg at 03/18/24 2131    ascorbic acid (Vitamin C) tablet 500 mg, 500 mg, oral, Daily, Jose Pham MD, 500 mg at 03/19/24 0941    benzocaine-menthol (Cepastat Sore Throat) 15-3.6 mg lozenge 1 lozenge, 1 lozenge, Mouth/Throat, q2h PRN, Reinaldo Cespedes MD, 1 lozenge at 03/19/24 0940    diclofenac sodium (Voltaren) 1 % gel 4 g, 4 g, Topical, 4x daily PRN, Radha Simon MD    FLUoxetine (PROzac) capsule 20 mg, 20 mg, oral, Daily, Jose Pham MD, 20 mg at 03/19/24 0941    gabapentin (Neurontin) capsule 300 mg, 300 mg, oral, q AM, Jose Pham MD, 300 mg at 03/19/24 0941    gabapentin (Neurontin) capsule 600 mg, 600 mg, oral, Nightly, Jose Pham MD, 600 mg at 03/18/24 2131    ipratropium-albuteroL (Duo-Neb) 0.5-2.5 mg/3 mL nebulizer solution 3 mL, 3 mL, nebulization, TID, Reza Garay MD, 3 mL at 03/19/24 0800    lacosamide (Vimpat) tablet 100 mg, 100 mg, oral, BID, Jose Pham MD, 100 mg at 03/19/24 0941    lamoTRIgine (LaMICtal) tablet 100 mg, 100 mg, oral, Daily, Jose Pham MD, 100 mg at 03/19/24 0941    lidocaine 4 % patch 1 patch, 1 patch, transdermal, Daily, Radha Simon MD, 1 patch at 03/18/24 1400    magnesium oxide (Mag-Ox) tablet 400 mg, 400 mg, oral, Daily, Reza Garay MD, 400 mg at 03/19/24 0941    metoprolol tartrate (Lopressor) tablet 50 mg, 50 mg, oral, BID, Reinaldo Cespedes MD, 50 mg at 03/19/24 0940    ondansetron (Zofran) tablet 4 mg, 4 mg, oral, q4h PRN, Reinaldo Cespedes MD, 4 mg at 03/19/24 1207    oxygen (O2) therapy, , inhalation, Continuous PRN - O2/gases, Reinaldo Cespedes MD, 5 L/min at 03/19/24 1000    pantoprazole (ProtoNix) EC tablet 40  mg, 40 mg, oral, Daily before breakfast, Jose Pham MD, 40 mg at 03/19/24 0625    rivaroxaban (Xarelto) tablet 10 mg, 10 mg, oral, Daily, Jose Pham MD, 10 mg at 03/19/24 0940    sucralfate (Carafate) tablet 1 g, 1 g, oral, q6h SONIA, Jose Pham MD, 1 g at 03/19/24 1207    SUMAtriptan (Imitrex) tablet 100 mg, 100 mg, oral, BID PRN, Jose Pham MD, 100 mg at 03/13/24 2043                                                                            Labs     Results for orders placed or performed during the hospital encounter of 03/07/24 (from the past 24 hour(s))   CBC and Auto Differential   Result Value Ref Range    WBC 3.3 (L) 4.4 - 11.3 x10*3/uL    nRBC 0.0 0.0 - 0.0 /100 WBCs    RBC 3.63 (L) 4.00 - 5.20 x10*6/uL    Hemoglobin 9.8 (L) 12.0 - 16.0 g/dL    Hematocrit 30.5 (L) 36.0 - 46.0 %    MCV 84 80 - 100 fL    MCH 27.0 26.0 - 34.0 pg    MCHC 32.1 32.0 - 36.0 g/dL    RDW 13.8 11.5 - 14.5 %    Platelets 262 150 - 450 x10*3/uL    Neutrophils % 50.9 40.0 - 80.0 %    Immature Granulocytes %, Automated 0.6 0.0 - 0.9 %    Lymphocytes % 35.0 13.0 - 44.0 %    Monocytes % 9.3 2.0 - 10.0 %    Eosinophils % 3.6 0.0 - 6.0 %    Basophils % 0.6 0.0 - 2.0 %    Neutrophils Absolute 1.70 1.20 - 7.70 x10*3/uL    Immature Granulocytes Absolute, Automated 0.02 0.00 - 0.70 x10*3/uL    Lymphocytes Absolute 1.17 (L) 1.20 - 4.80 x10*3/uL    Monocytes Absolute 0.31 0.10 - 1.00 x10*3/uL    Eosinophils Absolute 0.12 0.00 - 0.70 x10*3/uL    Basophils Absolute 0.02 0.00 - 0.10 x10*3/uL   Renal function panel   Result Value Ref Range    Glucose 88 74 - 99 mg/dL    Sodium 140 136 - 145 mmol/L    Potassium 3.8 3.5 - 5.3 mmol/L    Chloride 102 98 - 107 mmol/L    Bicarbonate 28 21 - 32 mmol/L    Anion Gap 14 10 - 20 mmol/L    Urea Nitrogen 12 6 - 23 mg/dL    Creatinine 0.63 0.50 - 1.05 mg/dL    eGFR >90 >60 mL/min/1.73m*2    Calcium 8.3 (L) 8.6 - 10.6 mg/dL    Phosphorus 4.4 2.5 - 4.9 mg/dL    Albumin 3.5 3.4 - 5.0 g/dL   Magnesium    Result Value Ref Range    Magnesium 2.04 1.60 - 2.40 mg/dL   ECG 12 Lead   Result Value Ref Range    Ventricular Rate 77 BPM    Atrial Rate 77 BPM    HI Interval 198 ms    QRS Duration 88 ms    QT Interval 444 ms    QTC Calculation(Bazett) 502 ms    P Axis 20 degrees    R Axis 97 degrees    T Axis 43 degrees    QRS Count 13 beats    Q Onset 220 ms    P Onset 121 ms    P Offset 172 ms    T Offset 442 ms    QTC Fredericia 482 ms                                                                          Imaging     No imaging available for review                                                                         GI Procedures     EGD 2017  Impression:            - Normal esophagus.                         - Z-line regular, 39 cm from the incisors.                         - Gastric bypass with a pouch 6 cm in length and                          intact staple line. Gastrojejunal anastomosis                          characterized by ulceration in jejunum and an intact                          staple line. no candy cane. no bile reflux. multiple                          superficial ulcers seen                         - Multiple non-bleeding jejunal ulcers with no                          stigmata of bleeding Biopsied.    ASSESSMENT / PLAN     ASSESSMENT/PLAN:  Jazmin Hein is a 49 y.o. female admitted on 3/7/2024 with severe pulmonary hypertension on chronic O2 and multiple vasodilator medications who presented with diarrhea and nausea/ vomiting. GI is consulted for persistent nausea.  This is most likely related to selexipag, which causes nausea in up to 33% of patients. Patient states that these symptoms have been present since she started this medication but became intractable when her dose was increased. It is possible that with by decreasing the dose, her symptoms may improve, but this may take time. In the meantime, patient would benefit from supportive care.  It seems that patient's symptoms have been  moderately controlled with zofran given prior to meals.     Recommendations:  -Use zofran with caution. If risks accepted, can discharge with 4mg SL PRN.  -Would avoid other typical anti-emetics due to the risk of further QTC prolongation  -For rescue, or intractable nausea/ vomiting with inability to tolerate PO, can consider ativan as needed.  -Low residue diet.  -Consider imodium PRN for diarrhea.  -Supportive care per primary team.     Patient was seen and discussed with Dr. Priest.    Recommendations communicated with the primary team at the bedside.  Thank you for this consult. Gastroenterology will continue to follow.  -During weekday hours of 7am-5pm please do not hesitate to contact me on [employer] Chat or page [phone] if there are any further questions between the weekday hours of 7 AM - 5 PM.   -After hours, on weekends, and on holidays, please [place] [phone]. Thank you.      Tania Kern MD  Gastroenterology and Hepatology Fellow  [facility]

## 2024-03-19 NOTE — PROGRESS NOTES
3/19/24 1142 Transitional Care Coordinator Notes:    Patient is requesting a rollator, script sent to team. Will send referral to [facility] for the equipment.    [employer] has delivered a tank to the bedside and has brought the patient nebulizer and concentrator to the home.                 Assessment/Plan   Principal Problem:    Allergic reaction to drug, initial encounter    Discharge Plans: discharge to home           Paola Nash RN       [phone]

## 2024-03-19 NOTE — SIGNIFICANT EVENT
Rapid Response RN Note    Rapid response RN at bedside for RADAR score 6 due to the following VS: T 36.7 °Celsius; HR 65 ; RR 18; /61; SPO2 91%.     Reviewed above VS with bedside RN.  VS within patient's current trends. RN to recheck POX & document;  No interventions by rapid response team indicated at this time.      Staff to page rapid response for any concerns or acute change in condition/VS.

## 2024-03-20 ENCOUNTER — APPOINTMENT (OUTPATIENT)
Dept: RESPIRATORY THERAPY | Facility: HOSPITAL | Age: 49
End: 2024-03-20
Payer: COMMERCIAL

## 2024-03-20 ENCOUNTER — APPOINTMENT (OUTPATIENT)
Dept: CARDIOLOGY | Facility: HOSPITAL | Age: 49
DRG: 556 | End: 2024-03-20
Payer: COMMERCIAL

## 2024-03-20 ENCOUNTER — APPOINTMENT (OUTPATIENT)
Dept: PULMONOLOGY | Facility: HOSPITAL | Age: 49
End: 2024-03-20
Payer: COMMERCIAL

## 2024-03-20 ENCOUNTER — APPOINTMENT (OUTPATIENT)
Dept: CARDIOLOGY | Facility: HOSPITAL | Age: 49
End: 2024-03-20
Payer: COMMERCIAL

## 2024-03-20 LAB
ALBUMIN SERPL BCP-MCNC: 3.6 G/DL (ref 3.4–5)
ANION GAP SERPL CALC-SCNC: 13 MMOL/L (ref 10–20)
BUN SERPL-MCNC: 11 MG/DL (ref 6–23)
CALCIUM SERPL-MCNC: 8.3 MG/DL (ref 8.6–10.6)
CHLORIDE SERPL-SCNC: 102 MMOL/L (ref 98–107)
CO2 SERPL-SCNC: 26 MMOL/L (ref 21–32)
CREAT SERPL-MCNC: 0.68 MG/DL (ref 0.5–1.05)
EGFRCR SERPLBLD CKD-EPI 2021: >90 ML/MIN/1.73M*2
GLUCOSE SERPL-MCNC: 86 MG/DL (ref 74–99)
MAGNESIUM SERPL-MCNC: 2.11 MG/DL (ref 1.6–2.4)
PHOSPHATE SERPL-MCNC: 4.3 MG/DL (ref 2.5–4.9)
POTASSIUM SERPL-SCNC: 4 MMOL/L (ref 3.5–5.3)
SODIUM SERPL-SCNC: 137 MMOL/L (ref 136–145)

## 2024-03-20 PROCEDURE — 2500000001 HC RX 250 WO HCPCS SELF ADMINISTERED DRUGS (ALT 637 FOR MEDICARE OP)

## 2024-03-20 PROCEDURE — 83735 ASSAY OF MAGNESIUM: CPT

## 2024-03-20 PROCEDURE — 2500000001 HC RX 250 WO HCPCS SELF ADMINISTERED DRUGS (ALT 637 FOR MEDICARE OP): Performed by: STUDENT IN AN ORGANIZED HEALTH CARE EDUCATION/TRAINING PROGRAM

## 2024-03-20 PROCEDURE — 2500000002 HC RX 250 W HCPCS SELF ADMINISTERED DRUGS (ALT 637 FOR MEDICARE OP, ALT 636 FOR OP/ED): Performed by: STUDENT IN AN ORGANIZED HEALTH CARE EDUCATION/TRAINING PROGRAM

## 2024-03-20 PROCEDURE — 2500000004 HC RX 250 GENERAL PHARMACY W/ HCPCS (ALT 636 FOR OP/ED)

## 2024-03-20 PROCEDURE — 2500000005 HC RX 250 GENERAL PHARMACY W/O HCPCS

## 2024-03-20 PROCEDURE — 94640 AIRWAY INHALATION TREATMENT: CPT

## 2024-03-20 PROCEDURE — 93005 ELECTROCARDIOGRAM TRACING: CPT

## 2024-03-20 PROCEDURE — 36415 COLL VENOUS BLD VENIPUNCTURE: CPT

## 2024-03-20 PROCEDURE — 1200000002 HC GENERAL ROOM WITH TELEMETRY DAILY

## 2024-03-20 PROCEDURE — 99233 SBSQ HOSP IP/OBS HIGH 50: CPT | Performed by: STUDENT IN AN ORGANIZED HEALTH CARE EDUCATION/TRAINING PROGRAM

## 2024-03-20 PROCEDURE — 80069 RENAL FUNCTION PANEL: CPT

## 2024-03-20 PROCEDURE — 99232 SBSQ HOSP IP/OBS MODERATE 35: CPT

## 2024-03-20 PROCEDURE — 2500000002 HC RX 250 W HCPCS SELF ADMINISTERED DRUGS (ALT 637 FOR MEDICARE OP, ALT 636 FOR OP/ED)

## 2024-03-20 RX ORDER — METHOCARBAMOL 500 MG/1
500 TABLET, FILM COATED ORAL ONCE
Status: COMPLETED | OUTPATIENT
Start: 2024-03-20 | End: 2024-03-20

## 2024-03-20 RX ORDER — FUROSEMIDE 10 MG/ML
40 INJECTION INTRAMUSCULAR; INTRAVENOUS ONCE
Status: COMPLETED | OUTPATIENT
Start: 2024-03-20 | End: 2024-03-20

## 2024-03-20 RX ADMIN — ACETAMINOPHEN 975 MG: 325 TABLET ORAL at 20:41

## 2024-03-20 RX ADMIN — ALLOPURINOL 100 MG: 100 TABLET ORAL at 09:21

## 2024-03-20 RX ADMIN — ONDANSETRON HYDROCHLORIDE 4 MG: 4 TABLET, FILM COATED ORAL at 11:24

## 2024-03-20 RX ADMIN — LACOSAMIDE 100 MG: 100 TABLET, FILM COATED ORAL at 20:42

## 2024-03-20 RX ADMIN — AMITRIPTYLINE HYDROCHLORIDE 50 MG: 50 TABLET, FILM COATED ORAL at 20:42

## 2024-03-20 RX ADMIN — ONDANSETRON HYDROCHLORIDE 4 MG: 4 TABLET, FILM COATED ORAL at 06:00

## 2024-03-20 RX ADMIN — RIOCIGUAT 2.5 MG: 2.5 TABLET, FILM COATED ORAL at 11:20

## 2024-03-20 RX ADMIN — METOPROLOL TARTRATE 50 MG: 50 TABLET, FILM COATED ORAL at 09:22

## 2024-03-20 RX ADMIN — LAMOTRIGINE 100 MG: 100 TABLET ORAL at 09:23

## 2024-03-20 RX ADMIN — IPRATROPIUM BROMIDE AND ALBUTEROL SULFATE 3 ML: .5; 3 SOLUTION RESPIRATORY (INHALATION) at 09:45

## 2024-03-20 RX ADMIN — SUCRALFATE 1 G: 1 TABLET ORAL at 11:20

## 2024-03-20 RX ADMIN — GABAPENTIN 300 MG: 300 CAPSULE ORAL at 09:21

## 2024-03-20 RX ADMIN — PANTOPRAZOLE SODIUM 40 MG: 40 TABLET, DELAYED RELEASE ORAL at 06:00

## 2024-03-20 RX ADMIN — RIOCIGUAT 2.5 MG: 2.5 TABLET, FILM COATED ORAL at 14:24

## 2024-03-20 RX ADMIN — FUROSEMIDE 40 MG: 10 INJECTION, SOLUTION INTRAVENOUS at 14:24

## 2024-03-20 RX ADMIN — SUCRALFATE 1 G: 1 TABLET ORAL at 01:25

## 2024-03-20 RX ADMIN — ONDANSETRON HYDROCHLORIDE 4 MG: 4 TABLET, FILM COATED ORAL at 18:16

## 2024-03-20 RX ADMIN — SUCRALFATE 1 G: 1 TABLET ORAL at 18:16

## 2024-03-20 RX ADMIN — Medication 400 MG: at 09:21

## 2024-03-20 RX ADMIN — LIDOCAINE 1 PATCH: 4 PATCH TOPICAL at 09:21

## 2024-03-20 RX ADMIN — METOPROLOL TARTRATE 50 MG: 50 TABLET, FILM COATED ORAL at 20:42

## 2024-03-20 RX ADMIN — SUCRALFATE 1 G: 1 TABLET ORAL at 06:00

## 2024-03-20 RX ADMIN — IPRATROPIUM BROMIDE AND ALBUTEROL SULFATE 3 ML: .5; 3 SOLUTION RESPIRATORY (INHALATION) at 21:14

## 2024-03-20 RX ADMIN — OXYCODONE HYDROCHLORIDE AND ACETAMINOPHEN 500 MG: 500 TABLET ORAL at 09:21

## 2024-03-20 RX ADMIN — MACITENTAN 10 MG: 10 TABLET, FILM COATED ORAL at 22:12

## 2024-03-20 RX ADMIN — ACETAMINOPHEN 975 MG: 325 TABLET ORAL at 14:24

## 2024-03-20 RX ADMIN — SELEXIPAG 600 MCG: 200 TABLET, COATED ORAL at 20:43

## 2024-03-20 RX ADMIN — IPRATROPIUM BROMIDE AND ALBUTEROL SULFATE 3 ML: .5; 3 SOLUTION RESPIRATORY (INHALATION) at 15:05

## 2024-03-20 RX ADMIN — METHOCARBAMOL 500 MG: 500 TABLET ORAL at 11:21

## 2024-03-20 RX ADMIN — ACETAMINOPHEN 975 MG: 325 TABLET ORAL at 06:52

## 2024-03-20 RX ADMIN — RIOCIGUAT 2.5 MG: 2.5 TABLET, FILM COATED ORAL at 20:42

## 2024-03-20 RX ADMIN — GABAPENTIN 600 MG: 300 CAPSULE ORAL at 20:42

## 2024-03-20 RX ADMIN — RIVAROXABAN 10 MG: 20 TABLET, FILM COATED ORAL at 09:22

## 2024-03-20 RX ADMIN — LACOSAMIDE 100 MG: 100 TABLET, FILM COATED ORAL at 09:21

## 2024-03-20 RX ADMIN — SELEXIPAG 600 MCG: 200 TABLET, COATED ORAL at 11:20

## 2024-03-20 RX ADMIN — FLUOXETINE 20 MG: 10 CAPSULE ORAL at 09:23

## 2024-03-20 ASSESSMENT — COGNITIVE AND FUNCTIONAL STATUS - GENERAL
CLIMB 3 TO 5 STEPS WITH RAILING: A LITTLE

## 2024-03-20 ASSESSMENT — PAIN DESCRIPTION - DESCRIPTORS: DESCRIPTORS: THROBBING

## 2024-03-20 ASSESSMENT — PAIN DESCRIPTION - LOCATION: LOCATION: HEAD

## 2024-03-20 ASSESSMENT — PAIN - FUNCTIONAL ASSESSMENT
PAIN_FUNCTIONAL_ASSESSMENT: 0-10
PAIN_FUNCTIONAL_ASSESSMENT: 0-10

## 2024-03-20 ASSESSMENT — PAIN SCALES - GENERAL
PAINLEVEL_OUTOF10: 4
PAINLEVEL_OUTOF10: 0 - NO PAIN
PAINLEVEL_OUTOF10: 7

## 2024-03-20 NOTE — PROGRESS NOTES
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    Subjective      Overnight, no acute events. This morning, overall stable     Objective   Last Recorded Vitals  /55   Pulse 65   Temp 36.6 °C (97.9 °F)   Resp 16   Ht 1.626 m (5' 4\")   Wt 109 kg (240 lb 4.8 oz)   SpO2 99%   BMI 41.25 kg/m²      Intake/Output last 3 Shifts:    Intake/Output Summary (Last 24 hours) at 3/20/2024 1433  Last data filed at 3/20/2024 1427  Gross per 24 hour   Intake 847 ml   Output 3800 ml   Net -2953 ml          Physical Exam  General appearance/Constitutional: no acute distress  Eyes: sclera anicteric  Head & Neck: moist mucous membranes  Respiratory/Chest Wall: clear breath sounds bilaterally, on 5L NC  Cardiovascular: regular rate and rhythm, warm extremities  Neurologic: awake, alert  Extremities: no lower extremity edema  Psychiatric: appropriate mood  Lines/Drains: 20 gauge pIV L forearm    Scheduled medications  selexipag, 600 mcg, oral, BID  [Held by provider] cariprazine, 1.5 mg, oral, Daily  riociguat, 2.5 mg, oral, TID  macitentan, 10 mg, oral, Daily  acetaminophen, 975 mg, oral, TID  allopurinol, 100 mg, oral, Daily  amitriptyline, 50 mg, oral, Nightly  ascorbic acid, 500 mg, oral, Daily  FLUoxetine, 20 mg, oral, Daily  gabapentin, 300 mg, oral, q AM  gabapentin, 600 mg, oral, Nightly  ipratropium-albuteroL, 3 mL, nebulization, TID  lacosamide, 100 mg, oral, BID  lamoTRIgine, 100 mg, oral, Daily  lidocaine, 1 patch, transdermal, Daily  magnesium oxide, 400 mg, oral, Daily  metoprolol tartrate, 50 mg, oral, BID  pantoprazole, 40 mg, oral, Daily before breakfast  rivaroxaban, 10 mg, oral, " Daily  sucralfate, 1 g, oral, q6h SONIA      Continuous medications     PRN medications  PRN medications: benzocaine-menthol, diclofenac sodium, ondansetron, oxygen, SUMAtriptan     Relevant Results     Lab Results   Component Value Date    GLUCOSE 86 03/20/2024    CALCIUM 8.3 (L) 03/20/2024     03/20/2024    K 4.0 03/20/2024    CO2 26 03/20/2024     03/20/2024    BUN 11 03/20/2024    CREATININE 0.68 03/20/2024      Assessment and Plan by Problem:   48 y/o F PMH [place]-en[place] gastric bypass with gastric ulcers (seen on EGD 4/2018), SVT/PACs/PVCs s/p PPM (2019), bipolar disorder, TBI complicated by chronic migraines, and 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    Updates 3/20  - s/p 40 mg IV Lasix x1 yesterday with 3.6 L out. This AM with weight of 109 kg. Will give another 40 IV lasix   - Need to determine final diuretic homegoing plan  - continuing to intermittently monitor EKG, increase in Qtc to 502 yesterday from 496 the day before    - nausea improved today, plan to start decreasing pt use of PRN zofran given it's effects on qtc and pt's current borderline QTc      #Diarrhea, myalgia, nausea, vomiting likely secondary to increasing dose of Selexipag as outpt  #Chronic hypoxic respiratory failure secondary to pulmonary arterial hypertension with possible component of CTEPH (on 4L NC at rest)  #History of recurrent PE (unprovoked in 2014, recurred after stopping anticoagulation, on Rivaroxaban at home)  #ELENA (on CPAP qHS)  - Last TTE 3/12/2024: EF 60-65%, LV normal without regional wall motion abnormalities. Interventricular septum is flattened in systole, consistent with right ventricular pressure overload. Pseudeonormal LV diastolic filling. LA moderately dilated. Normal RV global systolic function, RA severely dilated. Mild tricuspid  regurgitation. RVSP severely elevated 58.2 mmHg.   - Home regimen prior to admission was Adempas 2.5 mg TID, Macitentan 10 mg daily, Selexipag 1600 mcg BID  - Pt presented with 10 lbs weight loss, diarrhea, vomiting, poor PO intake, myalgia consistent with side effects of Selexipag. CK normal on admission.  - Initially was hypovolemic so supported with IV fluids, now taking in better PO and trying to optimize diuresis  - Selexipag decreased to 800 mcg BID on 3/14/14 per Dr. Johnson and further decreased to 600 mcg BID on 3/18/24 again per Dr. Johnson  - Last EKG 3/19/24 with QTc 502 msec  - In last 24 hours, had 2.5L urine outptut with 40 mg IV Lasix x 1 on 3/16  - Weight on 3/17 108 kg from 107 kg on 3/14/14  - Pt having intermittent episodes of nausea, intermittently with emesis- taking Zofran prior to each meal.    Plan:  - 40 mg IV Lasix today  - Dry weight seems to be about 108 kg  - Walking pulse ox today  - Continue Selexipag 800 mcg BID on discharge  - Continue home Adempas 2.5 mg TID and Macitentan 10 mg daily for pulmonary hypertension  - Continue home Rivaroxaban 10 mg daily  - Continue DuoNebs TID scheduled for now, can do PRN on discharge  - Continue Zofran 4 mg PO q4 PRN while inpt- would discharge with 4 mg PO q6 PRN  - Hold home Lasix 40 mg PO daily while diuresing with IV medication- will need to determine homegoing Lasix dose  - Will discharge with instructions to weigh herself daily and take an extra dose of diuretics if she gains 3 lbs in 2 days or 5 lbs in 1 week  - Ordered nebulizer for her as she feels DuoNebs are helping her breathe- will also send home with DuoNebs PRN  - RT consult for CPAP qHS  - Follow up with Dr. Johnson scheduled for 3/20/2024    #Migraines  - Continue home Sumatriptan 100 mg BID PRN and Amitriptyline 50 mg qHS  - Hold home, Butalbital-acetaminophen  mg capsule q4 PRN for migraines, Galcanezumab 120 mg/mL monthly injection, Rimegepant 75 mg ODT every other day while  inpt    #Bipolar disorder  - Continue home Fluoxetine 20 mg daily, Lacosamide 100 mg BID, Lamotrigine 100 mg daily  - Hold home Cariprazine 1.5 mg daily while inpt    #Peripheral neuropathy  - Continue home Gabapentin 300 mg daily and Gabapentin 600 mg qHS, did not use home Methocarbamol 750 mg q8 PRN when ordered so will d/c order while inpt but OK to use on discharge    #SVT/PACs/PVCs s/p PPM (2019)  #History of HTN  - Hold home Losartan 25 mg daily for now while blood pressures are low- may hold on discharge depending on how she does  - Continue home Metoprolol Tartrate 50 mg BID    #History of gastric ulcers  - Seen on EGD 4/2018  - Continue home Omeprazole 40 mg daily as Pantoprazole 40 mg daily while inpt due to formulary  - Continue home Sucralfate 1g q6 scheduled    MISC:  - Continue home Allopurinol 100 mg daily, Ascorbic acid 500 mg daily  - Hold home Vitamin D 75213 units weekly, Loratadine 10 mg daily, Multivitamin, and KCl 10 mEq daily for now- can resume on discharge     Fluids: none indicated  Nutrition: regular diet, 2L fluid restriction  Access: 20 gauge pIV L forearm  DVT ppx: on home Rivaroxaban 10 mg daily  GI ppx: continuing home Omeprazole 40 mg daily as Pantoprazole 40 mg daily while inpt due to formulary  Dispo: TBD, likely home with no needs on 3/18  Surrogate Decision Maker if Needed:  (Addi) [phone]  CODE STATUS: DNAR/DNI    Radha Simon MD   PGY-1 internal medicine

## 2024-03-20 NOTE — CARE PLAN
The patient's goals for the shift include  not have any vomiting    The clinical goals for the shift include Patient will tolerate meals today without emesis during shift    Over the shift, the patient did make progress to the above goals. Patient has tolerated meals today with no vomiting.

## 2024-03-20 NOTE — PROGRESS NOTES
"[facility]  [facility] [place]  CONSULT FOLLOW-UP     Reason For Consult: nausea/ vomiting    History Of Present Illness  Jazmin Hein is a 49 y.o. female with a past medical history of pulmonary hypertension (group 1) admitted on 3/7/2024 with poor PO intake, dehydration and diarrhea and nausea/ vomiting. GI is consulted for persistent nausea.   SUBJECTIVE     Patient endorses 1 episode of emesis in the past 24 hours. She state that symptoms are improving. She tolerating breakfast and lunch without emesis.    EXAM     Last Recorded Vitals  Blood pressure 102/55, pulse 65, temperature 36.6 °C (97.9 °F), resp. rate 16, height 1.626 m (5' 4\"), weight 109 kg (240 lb 4.8 oz), SpO2 99 %.      Intake/Output Summary (Last 24 hours) at 3/20/2024 1617  Last data filed at 3/20/2024 1427  Gross per 24 hour   Intake 847 ml   Output 3000 ml   Net -2153 ml      Physical Exam  General: well-nourished, no acute distress  HEENT: EOM intact, no scleral icterus, moist MM  Respiratory: nonlabored breathing on nasal cannula  Cardiovascular: RRR, no murmurs/rubs/gallops  Abdomen: Obese, soft, nontender, nondistended.  Extremities: no edema, no asterixis  Neuro: alert and oriented, CNII-XII grossly intact, moves all 4 extremities with no focal deficits  Psych: calm and cooperative    OBJECTIVE                                                                              Medications             Current Facility-Administered Medications:     **patient home med**selexipag (Uptravi) tablet 600 mcg, 600 mcg, oral, BID, Lynda Whitman MD, 600 mcg at 03/20/24 1120    [Held by provider] **Patient supplied medication** cariprazine (Vraylar) capsule 1.5 mg, 1.5 mg, oral, Daily, Jose Pham MD    **Patient supplied medication** riociguat (Adempas) tablet 2.5 mg, 2.5 mg, oral, TID, Juan Daniel Alvarado MD, 2.5 mg at 03/20/24 1424    **Patient supplied** macitentan (Opsumit) tablet tablet 10 mg, 10 mg, oral, " Daily, Juan Daniel Alvarado MD, 10 mg at 03/19/24 2135    acetaminophen (Tylenol) tablet 975 mg, 975 mg, oral, TID, Reinaldo Cespedes MD, 975 mg at 03/20/24 1424    allopurinol (Zyloprim) tablet 100 mg, 100 mg, oral, Daily, Jose Pham MD, 100 mg at 03/20/24 0921    amitriptyline (Elavil) tablet 50 mg, 50 mg, oral, Nightly, Jose Pham MD, 50 mg at 03/19/24 2135    ascorbic acid (Vitamin C) tablet 500 mg, 500 mg, oral, Daily, Jose Pham MD, 500 mg at 03/20/24 0921    benzocaine-menthol (Cepastat Sore Throat) 15-3.6 mg lozenge 1 lozenge, 1 lozenge, Mouth/Throat, q2h PRN, Reinaldo Cespedes MD, 1 lozenge at 03/19/24 0940    diclofenac sodium (Voltaren) 1 % gel 4 g, 4 g, Topical, 4x daily PRN, Radha Simon MD    FLUoxetine (PROzac) capsule 20 mg, 20 mg, oral, Daily, Jose Pham MD, 20 mg at 03/20/24 0923    gabapentin (Neurontin) capsule 300 mg, 300 mg, oral, q AM, Jose Pham MD, 300 mg at 03/20/24 0921    gabapentin (Neurontin) capsule 600 mg, 600 mg, oral, Nightly, Jose Pham MD, 600 mg at 03/19/24 2134    ipratropium-albuteroL (Duo-Neb) 0.5-2.5 mg/3 mL nebulizer solution 3 mL, 3 mL, nebulization, TID, Reza Garay MD, 3 mL at 03/20/24 1505    lacosamide (Vimpat) tablet 100 mg, 100 mg, oral, BID, Jose Pham MD, 100 mg at 03/20/24 0921    lamoTRIgine (LaMICtal) tablet 100 mg, 100 mg, oral, Daily, Jose Pham MD, 100 mg at 03/20/24 0923    lidocaine 4 % patch 1 patch, 1 patch, transdermal, Daily, Radha Simon MD, 1 patch at 03/20/24 0921    magnesium oxide (Mag-Ox) tablet 400 mg, 400 mg, oral, Daily, Reza Garay MD, 400 mg at 03/20/24 0921    metoprolol tartrate (Lopressor) tablet 50 mg, 50 mg, oral, BID, Reinaldo Cespedes MD, 50 mg at 03/20/24 0922    ondansetron (Zofran) tablet 4 mg, 4 mg, oral, q4h PRN, Reinaldo Cespedes MD, 4 mg at 03/20/24 1124    oxygen (O2) therapy, , inhalation, Continuous PRN - O2/gases, Reinaldo Cespedes MD, 5 L/min at 03/20/24 1505    pantoprazole (ProtoNix) EC tablet 40 mg, 40  mg, oral, Daily before breakfast, Jose Pham MD, 40 mg at 03/20/24 0600    rivaroxaban (Xarelto) tablet 10 mg, 10 mg, oral, Daily, Jose Pham MD, 10 mg at 03/20/24 0922    sucralfate (Carafate) tablet 1 g, 1 g, oral, q6h SONIA, Jose Pham MD, 1 g at 03/20/24 1120    SUMAtriptan (Imitrex) tablet 100 mg, 100 mg, oral, BID PRN, Jose Pham MD, 100 mg at 03/13/24 2043                                                                            Labs     Results for orders placed or performed during the hospital encounter of 03/07/24 (from the past 24 hour(s))   Renal Function Panel   Result Value Ref Range    Glucose 86 74 - 99 mg/dL    Sodium 137 136 - 145 mmol/L    Potassium 4.0 3.5 - 5.3 mmol/L    Chloride 102 98 - 107 mmol/L    Bicarbonate 26 21 - 32 mmol/L    Anion Gap 13 10 - 20 mmol/L    Urea Nitrogen 11 6 - 23 mg/dL    Creatinine 0.68 0.50 - 1.05 mg/dL    eGFR >90 >60 mL/min/1.73m*2    Calcium 8.3 (L) 8.6 - 10.6 mg/dL    Phosphorus 4.3 2.5 - 4.9 mg/dL    Albumin 3.6 3.4 - 5.0 g/dL   Magnesium   Result Value Ref Range    Magnesium 2.11 1.60 - 2.40 mg/dL                                                                          Imaging     No imaging available for review                                                                         GI Procedures     EGD 2017  Impression:            - Normal esophagus.                         - Z-line regular, 39 cm from the incisors.                         - Gastric bypass with a pouch 6 cm in length and                          intact staple line. Gastrojejunal anastomosis                          characterized by ulceration in jejunum and an intact                          staple line. no candy cane. no bile reflux. multiple                          superficial ulcers seen                         - Multiple non-bleeding jejunal ulcers with no                          stigmata of bleeding Biopsied.    ASSESSMENT / PLAN     ASSESSMENT/PLAN:  Jazmin Hein is  a 49 y.o. female admitted on 3/7/2024 with severe pulmonary hypertension on chronic O2 and multiple vasodilator medications who presented with diarrhea and nausea/ vomiting. GI is consulted for persistent nausea.  This is most likely related to selexipag, which causes nausea in up to 33% of patients. Patient states that these symptoms have been present since she started this medication but became intractable when her dose was increased. It is possible that with by decreasing the dose, her symptoms may improve, but this may take time. In the meantime, patient would benefit from supportive care.  It seems that patient's symptoms have been moderately controlled with zofran given prior to meals.     Recommendations:  -Use zofran with caution. If risks accepted, can discharge with 4mg SL PRN.  -Would avoid other typical anti-emetics due to the risk of further QTC prolongation  -For rescue, or intractable nausea/ vomiting with inability to tolerate PO, can consider ativan as needed.  -Low residue diet.  -Consider imodium PRN for diarrhea.  -Supportive care per primary team.     Patient was seen and discussed with Dr. Priest.    Thank you for this consult. Gastroenterology will sign off.  -During weekday hours of 7am-5pm please do not hesitate to contact me on [employer] Chat or page [phone] if there are any further questions between the weekday hours of 7 AM - 5 PM.   -After hours, on weekends, and on holidays, please page the on-[place] GI fellow at [phone]. Thank you.      Tania Kern MD  Gastroenterology and Hepatology Fellow  [facility]

## 2024-03-20 NOTE — PROGRESS NOTES
3/20/24 1571 Transitional Care Coordinator Notes:    Team will contact Pulmonary and GI team to discuss patient nausea. Will await for final recommendations.     [employer] will deliver the rollator. Anticipated discharge to home in 2-3 day.                       Assessment/Plan   Principal Problem:    Allergic reaction to drug, initial encounter    Discharge Plans: discharge to home           Paola Nash RN

## 2024-03-21 LAB
ALBUMIN SERPL BCP-MCNC: 3.5 G/DL (ref 3.4–5)
ANION GAP SERPL CALC-SCNC: 14 MMOL/L (ref 10–20)
BUN SERPL-MCNC: 14 MG/DL (ref 6–23)
CALCIUM SERPL-MCNC: 8.9 MG/DL (ref 8.6–10.6)
CHLORIDE SERPL-SCNC: 103 MMOL/L (ref 98–107)
CO2 SERPL-SCNC: 28 MMOL/L (ref 21–32)
CREAT SERPL-MCNC: 0.69 MG/DL (ref 0.5–1.05)
EGFRCR SERPLBLD CKD-EPI 2021: >90 ML/MIN/1.73M*2
GLUCOSE SERPL-MCNC: 93 MG/DL (ref 74–99)
MAGNESIUM SERPL-MCNC: 2.05 MG/DL (ref 1.6–2.4)
PHOSPHATE SERPL-MCNC: 4.9 MG/DL (ref 2.5–4.9)
POTASSIUM SERPL-SCNC: 3.8 MMOL/L (ref 3.5–5.3)
SODIUM SERPL-SCNC: 141 MMOL/L (ref 136–145)

## 2024-03-21 PROCEDURE — 83735 ASSAY OF MAGNESIUM: CPT

## 2024-03-21 PROCEDURE — 84100 ASSAY OF PHOSPHORUS: CPT

## 2024-03-21 PROCEDURE — 2500000002 HC RX 250 W HCPCS SELF ADMINISTERED DRUGS (ALT 637 FOR MEDICARE OP, ALT 636 FOR OP/ED)

## 2024-03-21 PROCEDURE — 2500000001 HC RX 250 WO HCPCS SELF ADMINISTERED DRUGS (ALT 637 FOR MEDICARE OP)

## 2024-03-21 PROCEDURE — 2500000005 HC RX 250 GENERAL PHARMACY W/O HCPCS

## 2024-03-21 PROCEDURE — 2500000002 HC RX 250 W HCPCS SELF ADMINISTERED DRUGS (ALT 637 FOR MEDICARE OP, ALT 636 FOR OP/ED): Performed by: STUDENT IN AN ORGANIZED HEALTH CARE EDUCATION/TRAINING PROGRAM

## 2024-03-21 PROCEDURE — 36415 COLL VENOUS BLD VENIPUNCTURE: CPT

## 2024-03-21 PROCEDURE — 1200000002 HC GENERAL ROOM WITH TELEMETRY DAILY

## 2024-03-21 PROCEDURE — 2500000001 HC RX 250 WO HCPCS SELF ADMINISTERED DRUGS (ALT 637 FOR MEDICARE OP): Performed by: STUDENT IN AN ORGANIZED HEALTH CARE EDUCATION/TRAINING PROGRAM

## 2024-03-21 PROCEDURE — 2500000004 HC RX 250 GENERAL PHARMACY W/ HCPCS (ALT 636 FOR OP/ED)

## 2024-03-21 PROCEDURE — 99232 SBSQ HOSP IP/OBS MODERATE 35: CPT

## 2024-03-21 PROCEDURE — 94640 AIRWAY INHALATION TREATMENT: CPT

## 2024-03-21 RX ORDER — FUROSEMIDE 40 MG/1
40 TABLET ORAL DAILY
Status: DISCONTINUED | OUTPATIENT
Start: 2024-03-21 | End: 2024-03-22 | Stop reason: HOSPADM

## 2024-03-21 RX ADMIN — PANTOPRAZOLE SODIUM 40 MG: 40 TABLET, DELAYED RELEASE ORAL at 05:56

## 2024-03-21 RX ADMIN — FLUOXETINE 20 MG: 10 CAPSULE ORAL at 08:54

## 2024-03-21 RX ADMIN — SELEXIPAG 600 MCG: 200 TABLET, COATED ORAL at 08:55

## 2024-03-21 RX ADMIN — ONDANSETRON HYDROCHLORIDE 4 MG: 4 TABLET, FILM COATED ORAL at 05:55

## 2024-03-21 RX ADMIN — OXYCODONE HYDROCHLORIDE AND ACETAMINOPHEN 500 MG: 500 TABLET ORAL at 08:54

## 2024-03-21 RX ADMIN — Medication 400 MG: at 08:54

## 2024-03-21 RX ADMIN — IPRATROPIUM BROMIDE AND ALBUTEROL SULFATE 3 ML: .5; 3 SOLUTION RESPIRATORY (INHALATION) at 21:39

## 2024-03-21 RX ADMIN — ACETAMINOPHEN 975 MG: 325 TABLET ORAL at 20:59

## 2024-03-21 RX ADMIN — SUCRALFATE 1 G: 1 TABLET ORAL at 12:28

## 2024-03-21 RX ADMIN — MACITENTAN 10 MG: 10 TABLET, FILM COATED ORAL at 20:59

## 2024-03-21 RX ADMIN — LIDOCAINE 1 PATCH: 4 PATCH TOPICAL at 08:54

## 2024-03-21 RX ADMIN — FUROSEMIDE 40 MG: 40 TABLET ORAL at 13:35

## 2024-03-21 RX ADMIN — GABAPENTIN 600 MG: 300 CAPSULE ORAL at 20:59

## 2024-03-21 RX ADMIN — LAMOTRIGINE 100 MG: 100 TABLET ORAL at 08:54

## 2024-03-21 RX ADMIN — RIOCIGUAT 2.5 MG: 2.5 TABLET, FILM COATED ORAL at 20:59

## 2024-03-21 RX ADMIN — RIOCIGUAT 2.5 MG: 2.5 TABLET, FILM COATED ORAL at 08:56

## 2024-03-21 RX ADMIN — AMITRIPTYLINE HYDROCHLORIDE 50 MG: 50 TABLET, FILM COATED ORAL at 20:59

## 2024-03-21 RX ADMIN — IPRATROPIUM BROMIDE AND ALBUTEROL SULFATE 3 ML: .5; 3 SOLUTION RESPIRATORY (INHALATION) at 09:23

## 2024-03-21 RX ADMIN — LACOSAMIDE 100 MG: 100 TABLET, FILM COATED ORAL at 20:59

## 2024-03-21 RX ADMIN — LACOSAMIDE 100 MG: 100 TABLET, FILM COATED ORAL at 08:54

## 2024-03-21 RX ADMIN — SUCRALFATE 1 G: 1 TABLET ORAL at 06:00

## 2024-03-21 RX ADMIN — METOPROLOL TARTRATE 50 MG: 50 TABLET, FILM COATED ORAL at 08:54

## 2024-03-21 RX ADMIN — RIOCIGUAT 2.5 MG: 2.5 TABLET, FILM COATED ORAL at 14:08

## 2024-03-21 RX ADMIN — SUCRALFATE 1 G: 1 TABLET ORAL at 19:12

## 2024-03-21 RX ADMIN — SELEXIPAG 600 MCG: 200 TABLET, COATED ORAL at 20:59

## 2024-03-21 RX ADMIN — ACETAMINOPHEN 975 MG: 325 TABLET ORAL at 14:08

## 2024-03-21 RX ADMIN — ACETAMINOPHEN 975 MG: 325 TABLET ORAL at 08:54

## 2024-03-21 RX ADMIN — IPRATROPIUM BROMIDE AND ALBUTEROL SULFATE 3 ML: .5; 3 SOLUTION RESPIRATORY (INHALATION) at 15:32

## 2024-03-21 RX ADMIN — ALLOPURINOL 100 MG: 100 TABLET ORAL at 08:56

## 2024-03-21 RX ADMIN — METOPROLOL TARTRATE 50 MG: 50 TABLET, FILM COATED ORAL at 20:59

## 2024-03-21 RX ADMIN — GABAPENTIN 300 MG: 300 CAPSULE ORAL at 08:54

## 2024-03-21 RX ADMIN — RIVAROXABAN 10 MG: 20 TABLET, FILM COATED ORAL at 08:54

## 2024-03-21 ASSESSMENT — PAIN SCALES - GENERAL
PAINLEVEL_OUTOF10: 0 - NO PAIN
PAINLEVEL_OUTOF10: 0 - NO PAIN

## 2024-03-21 ASSESSMENT — COGNITIVE AND FUNCTIONAL STATUS - GENERAL
MOBILITY SCORE: 23
DAILY ACTIVITIY SCORE: 24
DAILY ACTIVITIY SCORE: 24
CLIMB 3 TO 5 STEPS WITH RAILING: A LITTLE
CLIMB 3 TO 5 STEPS WITH RAILING: A LITTLE
MOBILITY SCORE: 24
CLIMB 3 TO 5 STEPS WITH RAILING: A LITTLE

## 2024-03-21 ASSESSMENT — PAIN - FUNCTIONAL ASSESSMENT
PAIN_FUNCTIONAL_ASSESSMENT: 0-10
PAIN_FUNCTIONAL_ASSESSMENT: 0-10

## 2024-03-21 NOTE — CARE PLAN
Problem: Discharge Planning  Goal: Discharge to home or other facility with appropriate resources  Outcome: Progressing     Problem: Chronic Conditions and Co-morbidities  Goal: Patient's chronic conditions and co-morbidity symptoms are monitored and maintained or improved  Outcome: Progressing     Problem: Nutrition  Goal: Oral intake greater than 50%  Outcome: Progressing  Goal: Consume prescribed supplement  Outcome: Progressing  Goal: Adequate PO fluid intake  Outcome: Progressing  Goal: Nutrition support goals are met within 48 hrs  Outcome: Progressing  Goal: Nutrition support is meeting 75% of nutrient needs  Outcome: Progressing  Goal: BG  mg/dL  Outcome: Progressing  Goal: Lab values WNL  Outcome: Progressing  Goal: Electrolytes WNL  Outcome: Progressing  Goal: Promote healing  Outcome: Progressing  Goal: Maintain stable weight  Outcome: Progressing  Goal: Reduce weight from edema/fluid  Outcome: Progressing  Goal: Gradual weight gain  Outcome: Progressing  Goal: Improve ostomy output  Outcome: Progressing     Problem: Respiratory  Goal: Clear secretions with interventions this shift  Outcome: Progressing  Goal: Minimize anxiety/maximize coping throughout shift  Outcome: Progressing  Goal: Minimal/no exertional discomfort or dyspnea this shift  Outcome: Progressing  Goal: No signs of respiratory distress (eg. Use of accessory muscles. Peds grunting)  Outcome: Progressing  Goal: Patent airway maintained this shift  Outcome: Progressing  Goal: Tolerate mechanical ventilation evidenced by VS/agitation level this shift  Outcome: Progressing  Goal: Tolerate pulmonary toileting this shift  Outcome: Progressing  Goal: Verbalize decreased shortness of breath this shift  Outcome: Progressing  Goal: Wean oxygen to maintain O2 saturation per order/standard this shift  Outcome: Progressing  Goal: Increase self care and/or family involvement in next 24 hours  Outcome: Progressing     Problem: Pain  Goal: Takes  deep breaths with improved pain control throughout the shift  Outcome: Progressing  Goal: Turns in bed with improved pain control throughout the shift  Outcome: Progressing  Goal: Walks with improved pain control throughout the shift  Outcome: Progressing  Goal: Performs ADL's with improved pain control throughout shift  Outcome: Progressing  Goal: Participates in PT with improved pain control throughout the shift  Outcome: Progressing  Goal: Free from opioid side effects throughout the shift  Outcome: Progressing  Goal: Free from acute confusion related to pain meds throughout the shift  Outcome: Progressing   The patient's goals for the shift include      The clinical goals for the shift include Pt will nnot have n/v trhoughout shift

## 2024-03-21 NOTE — CARE PLAN
Problem: Nutrition  Goal: BG  mg/dL  Outcome: Progressing     Problem: Respiratory  Goal: No signs of respiratory distress (eg. Use of accessory muscles. Peds grunting)  Outcome: Progressing     Problem: Respiratory  Goal: Wean oxygen to maintain O2 saturation per order/standard this shift  Outcome: Progressing         The clinical goals for the shift include Pt's pulse ox will remain > 92% throughout shift.

## 2024-03-21 NOTE — PROGRESS NOTES
"Nutrition Follow Up Assessment  Nutrition Assessment         Patient is a 49 y.o. female who is day 14 of admission for Allergic reaction to drug, initial encounter.       Nutrition History:  Food and Nutrient History: Pt reports she has a good appetite. States she is feeling better today. Reports she continues to eat 3 meals per day and ~75% of meals. Reports she has been drinking the Ensure Plus supplement but only likes the chocolate flavor. Reports she has not had emesis in the last 24 hours. Reports she is taking zofran with some meals which has helped with N/V. Denies abdominal pain and constipation. Endorses some diarrhea.    Anthropometrics:  Height: 162.6 cm (5' 4\")   Weight: 108 kg (238 lb 15.7 oz)   BMI (Calculated): 41  IBW/kg (Dietitian Calculated): 54.5 kg  Percent of IBW: 199 %       Admission Weight Trend:  Date/Time Weight   03/21/24 0608 108 kg (238 lb 15.7 oz)   03/20/24 0813 109 kg (240 lb 4.8 oz)   03/19/24 1705 109 kg (240 lb)   03/18/24 0622 109 kg (239 lb 13.8 oz)   03/17/24 0946 108 kg (239 lb)   03/16/24 0900 109 kg (239 lb 13.8 oz)   03/16/24 0610 115 kg (253 lb 12 oz) - suspect wt up d/t fluid   03/14/24 0617 107 kg (236 lb 6.4 oz)   03/13/24 1003 108 kg (238 lb 3.2 oz)   03/08/24 10:39:26 107 kg (236 lb 8.9 oz)   03/07/24 1853 104 kg (230 lb)     Weight Change %:  Weight History / % Weight Change: Suspect wt flutuations during this admission d/t fluid changes.    Nutrition Focused Physical Exam Findings:  defer: full NFPE completed 3/8.  Edema:  Edema:  (Generalized non-pitting, RLE non-pitting, LLE +1)  Physical Findings:  Skin: Negative    Nutrition Significant Labs:  CBC Trend:   Results from last 7 days   Lab Units 03/19/24  0455 03/16/24  0819 03/15/24  0929   WBC AUTO x10*3/uL 3.3* 3.1* 3.6*   RBC AUTO x10*6/uL 3.63* 3.96* 3.71*   HEMOGLOBIN g/dL 9.8* 10.7* 10.0*   HEMATOCRIT % 30.5* 34.4* 30.0*   MCV fL 84 87 81   PLATELETS AUTO x10*3/uL 262 250 221    , BMP Trend:   Results from " last 7 days   Lab Units 03/21/24  0553 03/20/24  0617 03/19/24  0455 03/18/24  0756   GLUCOSE mg/dL 93 86 88 77   CALCIUM mg/dL 8.9 8.3* 8.3* 8.3*   SODIUM mmol/L 141 137 140 139   POTASSIUM mmol/L 3.8 4.0 3.8 4.0   CO2 mmol/L 28 26 28 25   CHLORIDE mmol/L 103 102 102 102   BUN mg/dL 14 11 12 13   CREATININE mg/dL 0.69 0.68 0.63 0.62   Renal Lab Trend:   Results from last 7 days   Lab Units 03/21/24  0553 03/20/24  0617 03/19/24  0455 03/18/24  0756   POTASSIUM mmol/L 3.8 4.0 3.8 4.0   PHOSPHORUS mg/dL 4.9 4.3 4.4 4.1   SODIUM mmol/L 141 137 140 139   MAGNESIUM mg/dL 2.05 2.11 2.04 2.32   EGFR mL/min/1.73m*2 >90 >90 >90 >90   BUN mg/dL 14 11 12 13   CREATININE mg/dL 0.69 0.68 0.63 0.62        Nutrition Specific Medications:  Scheduled medications  allopurinol, 100 mg, oral, Daily  amitriptyline, 50 mg, oral, Nightly  ascorbic acid, 500 mg, oral, Daily  ipratropium-albuteroL, 3 mL, nebulization, TID  magnesium oxide, 400 mg, oral, Daily  metoprolol tartrate, 50 mg, oral, BID  pantoprazole, 40 mg, oral, Daily before breakfast  sucralfate, 1 g, oral, q6h SONIA     PRN medications  PRN medications: benzocaine-menthol, diclofenac sodium, ondansetron, oxygen, SUMAtriptan     I/O:   Last BM Date: 03/20/24; Stool Appearance: Unable to assess (03/21/24 0900)    Dietary Orders (From admission, onward)       Start     Ordered    03/17/24 0908  Adult diet Regular; 2000 mL fluid  Diet effective now        Question Answer Comment   Diet type Regular    Dietary fluid restriction / 24h: 2000 mL fluid        03/17/24 0908    03/14/24 1216  Oral nutritional supplements  Until discontinued        Question Answer Comment   Deliver with Dinner    Select supplement: Ensure Plus        03/14/24 1216                     Estimated Needs:   Total Energy Estimated Needs (kCal): 2013 kCal  Method for Estimating Needs: MSJ (2017) x 1.2  Total Protein Estimated Needs (g): 82 g  Method for Estimating Needs: 1.5g/kg IBW +  Total Fluid Estimated  Needs (mL):  (per MD/ team)           Nutrition Diagnosis   Malnutrition Diagnosis  Patient has Malnutrition Diagnosis: No    Nutrition Diagnosis  Patient has Nutrition Diagnosis: Yes  Diagnosis Status (1): Declining  Nutrition Diagnosis 1: Inadequate oral intake  Related to (1): inability to consume sufficient energy  As Evidenced by (1): N/V/D       Nutrition Interventions/Recommendations         Nutrition Prescription:  Ensure Plus once daily (350kcal, 13g protein).   Continue regular diet to promote PO intake.   Pt would like to lose wt and expressed interest in following with outpatient dietitian. Please put referral in outpatient EMR for RDN visit/clinical nutrition services. Add scheduling number ( [phone] ) to pt's discharge summary.        Nutrition Interventions:   Food and/or Nutrient Delivery Interventions  Interventions: Meals and snacks, Medical food supplement  Goal: consume >50% of meals  Medical Food Supplement: Commercial beverage  Goal: consume Ensure Plus QD         Nutrition Monitoring and Evaluation   Food/Nutrient Related History Monitoring  Monitoring and Evaluation Plan: Energy intake  Criteria: meet >75% of estimated energy needs    Body Composition/Growth/Weight History  Monitoring and Evaluation Plan: Weight  Criteria: stable weight    Biochemical Data, Medical Tests and Procedures  Monitoring and Evaluation Plan: Electrolyte/renal panel, Glucose/endocrine profile  Criteria: lytes WNL, BG WNL    Nutrition Focused Physical Findings  Digestive System: Decrease in appetite  Criteria: GI function       Time Spent/Follow-up Reminder:   Time Spent (min): 30 minutes  Last Date of Nutrition Visit: 03/21/24  Nutrition Follow-Up Needed?: Dietitian to reassess per policy

## 2024-03-21 NOTE — PROGRESS NOTES
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    Subjective      Overnight, no acute events. This morning, overall stable with improved nausea. Discussed with patient only to start taking the zofran when nausea/or vomiting instead of getting it before each meal. Zofran has been always ordered PRN.     Objective   Last Recorded Vitals  BP 98/59   Pulse 79   Temp 36.7 °C (98.1 °F)   Resp 16   Ht 1.626 m (5' 4\")   Wt 108 kg (238 lb 15.7 oz)   SpO2 95%   BMI 41.02 kg/m²      Intake/Output last 3 Shifts:    Intake/Output Summary (Last 24 hours) at 3/21/2024 0847  Last data filed at 3/20/2024 1816  Gross per 24 hour   Intake 1207 ml   Output 2300 ml   Net -1093 ml          Physical Exam  General appearance/Constitutional: no acute distress  Eyes: sclera anicteric  Head & Neck: moist mucous membranes  Respiratory/Chest Wall: clear breath sounds bilaterally, on 5L NC  Cardiovascular: regular rate and rhythm, warm extremities  Neurologic: awake, alert  Extremities: no lower extremity edema  Psychiatric: appropriate mood  Lines/Drains: 20 gauge pIV L forearm    Scheduled medications  selexipag, 600 mcg, oral, BID  [Held by provider] cariprazine, 1.5 mg, oral, Daily  riociguat, 2.5 mg, oral, TID  macitentan, 10 mg, oral, Daily  acetaminophen, 975 mg, oral, TID  allopurinol, 100 mg, oral, Daily  amitriptyline, 50 mg, oral, Nightly  ascorbic acid, 500 mg, oral, Daily  FLUoxetine, 20 mg, oral, Daily  gabapentin, 300 mg, oral, q AM  gabapentin, 600 mg, oral, Nightly  ipratropium-albuteroL, 3 mL, nebulization, TID  lacosamide, 100 mg, oral, BID  lamoTRIgine, 100 mg, oral, Daily  lidocaine, 1 patch, " transdermal, Daily  magnesium oxide, 400 mg, oral, Daily  metoprolol tartrate, 50 mg, oral, BID  pantoprazole, 40 mg, oral, Daily before breakfast  rivaroxaban, 10 mg, oral, Daily  sucralfate, 1 g, oral, q6h SONIA      Continuous medications     PRN medications  PRN medications: benzocaine-menthol, diclofenac sodium, ondansetron, oxygen, SUMAtriptan     Relevant Results     Lab Results   Component Value Date    GLUCOSE 93 03/21/2024    CALCIUM 8.9 03/21/2024     03/21/2024    K 3.8 03/21/2024    CO2 28 03/21/2024     03/21/2024    BUN 14 03/21/2024    CREATININE 0.69 03/21/2024      Assessment and Plan by Problem:   50 y/o F PMH [place]-en-Y gastric bypass with gastric ulcers (seen on EGD 4/2018), SVT/PACs/PVCs s/p PPM (2019), bipolar disorder, TBI complicated by chronic migraines, and 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    Updates 3/21  - s/p 40 mg IV Lasix x1 yesterday with 2.9 L out. This AM with weight of 108 kg. Possibly will give another 40 IV lasix   - Need to determine final diuretic homegoing plan    - nausea improved today, discussed with pt to only take zofran when nauseous/vomiting   - possible discharge today vs. Tomorrow if pt continues to do well       #Diarrhea, myalgia, nausea, vomiting likely secondary to increasing dose of Selexipag as outpt  #Chronic hypoxic respiratory failure secondary to pulmonary arterial hypertension with possible component of CTEPH (on 4L NC at rest)  #History of recurrent PE (unprovoked in 2014, recurred after stopping anticoagulation, on Rivaroxaban at home)  #ELENA (on CPAP qHS)  - Last TTE 3/12/2024: EF 60-65%, LV normal without regional wall motion abnormalities. Interventricular septum is flattened in systole, consistent with right ventricular pressure overload. Pseudeonormal LV diastolic  filling. LA moderately dilated. Normal RV global systolic function, RA severely dilated. Mild tricuspid regurgitation. RVSP severely elevated 58.2 mmHg.   - Home regimen prior to admission was Adempas 2.5 mg TID, Macitentan 10 mg daily, Selexipag 1600 mcg BID  - Pt presented with 10 lbs weight loss, diarrhea, vomiting, poor PO intake, myalgia consistent with side effects of Selexipag. CK normal on admission.  - Initially was hypovolemic so supported with IV fluids, now taking in better PO and trying to optimize diuresis  - Selexipag decreased to 800 mcg BID on 3/14/14 per Dr. Johnson and further decreased to 600 mcg BID on 3/18/24 again per Dr. Johnson  - Last EKG 3/19/24 with QTc 502 msec  - In last 24 hours, had 2.5L urine outptut with 40 mg IV Lasix x 1 on 3/16  - Weight on 3/17 108 kg from 107 kg on 3/14/14  - Pt having intermittent episodes of nausea, intermittently with emesis- taking Zofran prior to each meal.    Plan:  - 40 mg IV Lasix today  - Dry weight seems to be about 108 kg  - Walking pulse ox today  - Continue Selexipag 800 mcg BID on discharge  - Continue home Adempas 2.5 mg TID and Macitentan 10 mg daily for pulmonary hypertension  - Continue home Rivaroxaban 10 mg daily  - Continue DuoNebs TID scheduled for now, can do PRN on discharge  - Continue Zofran 4 mg PO q4 PRN while inpt- would discharge with 4 mg PO q6 PRN  - Hold home Lasix 40 mg PO daily while diuresing with IV medication- will need to determine homegoing Lasix dose  - Will discharge with instructions to weigh herself daily and take an extra dose of diuretics if she gains 3 lbs in 2 days or 5 lbs in 1 week  - Ordered nebulizer for her as she feels DuoNebs are helping her breathe- will also send home with DuoNebs PRN  - RT consult for CPAP qHS  - Follow up with Dr. Johnson scheduled for 3/20/2024    #Migraines  - Continue home Sumatriptan 100 mg BID PRN and Amitriptyline 50 mg qHS  - Hold home, Butalbital-acetaminophen  mg capsule q4  PRN for migraines, Galcanezumab 120 mg/mL monthly injection, Rimegepant 75 mg ODT every other day while inpt    #Bipolar disorder  - Continue home Fluoxetine 20 mg daily, Lacosamide 100 mg BID, Lamotrigine 100 mg daily  - Hold home Cariprazine 1.5 mg daily while inpt    #Peripheral neuropathy  - Continue home Gabapentin 300 mg daily and Gabapentin 600 mg qHS, did not use home Methocarbamol 750 mg q8 PRN when ordered so will d/c order while inpt but OK to use on discharge    #SVT/PACs/PVCs s/p PPM (2019)  #History of HTN  - Hold home Losartan 25 mg daily for now while blood pressures are low- may hold on discharge depending on how she does  - Continue home Metoprolol Tartrate 50 mg BID    #History of gastric ulcers  - Seen on EGD 4/2018  - Continue home Omeprazole 40 mg daily as Pantoprazole 40 mg daily while inpt due to formulary  - Continue home Sucralfate 1g q6 scheduled    MISC:  - Continue home Allopurinol 100 mg daily, Ascorbic acid 500 mg daily  - Hold home Vitamin D 75500 units weekly, Loratadine 10 mg daily, Multivitamin, and KCl 10 mEq daily for now- can resume on discharge     Fluids: none indicated  Nutrition: regular diet, 2L fluid restriction  Access: 20 gauge pIV L forearm  DVT ppx: on home Rivaroxaban 10 mg daily  GI ppx: continuing home Omeprazole 40 mg daily as Pantoprazole 40 mg daily while inpt due to formulary  Dispo: TBD, likely home with no needs on 3/18  Surrogate Decision Maker if Needed:  (Addi) [phone]  CODE STATUS: DNAR/DNI    Radha Simon MD   PGY-1 internal medicine

## 2024-03-22 ENCOUNTER — TELEPHONE (OUTPATIENT)
Dept: PULMONOLOGY | Facility: HOSPITAL | Age: 49
End: 2024-03-22
Payer: COMMERCIAL

## 2024-03-22 ENCOUNTER — APPOINTMENT (OUTPATIENT)
Dept: CARDIOLOGY | Facility: HOSPITAL | Age: 49
DRG: 556 | End: 2024-03-22
Payer: COMMERCIAL

## 2024-03-22 ENCOUNTER — PHARMACY VISIT (OUTPATIENT)
Dept: PHARMACY | Facility: CLINIC | Age: 49
End: 2024-03-22
Payer: COMMERCIAL

## 2024-03-22 VITALS
OXYGEN SATURATION: 95 % | HEART RATE: 71 BPM | BODY MASS INDEX: 40.8 KG/M2 | WEIGHT: 238.98 LBS | SYSTOLIC BLOOD PRESSURE: 94 MMHG | HEIGHT: 64 IN | DIASTOLIC BLOOD PRESSURE: 41 MMHG | RESPIRATION RATE: 20 BRPM | TEMPERATURE: 96.8 F

## 2024-03-22 DIAGNOSIS — I27.20 PULMONARY HYPERTENSION (MULTI): ICD-10-CM

## 2024-03-22 PROCEDURE — 2500000004 HC RX 250 GENERAL PHARMACY W/ HCPCS (ALT 636 FOR OP/ED)

## 2024-03-22 PROCEDURE — 99239 HOSP IP/OBS DSCHRG MGMT >30: CPT

## 2024-03-22 PROCEDURE — 2500000002 HC RX 250 W HCPCS SELF ADMINISTERED DRUGS (ALT 637 FOR MEDICARE OP, ALT 636 FOR OP/ED): Performed by: STUDENT IN AN ORGANIZED HEALTH CARE EDUCATION/TRAINING PROGRAM

## 2024-03-22 PROCEDURE — 93005 ELECTROCARDIOGRAM TRACING: CPT

## 2024-03-22 PROCEDURE — 2500000002 HC RX 250 W HCPCS SELF ADMINISTERED DRUGS (ALT 637 FOR MEDICARE OP, ALT 636 FOR OP/ED)

## 2024-03-22 PROCEDURE — RXMED WILLOW AMBULATORY MEDICATION CHARGE

## 2024-03-22 PROCEDURE — 2500000005 HC RX 250 GENERAL PHARMACY W/O HCPCS

## 2024-03-22 PROCEDURE — 94640 AIRWAY INHALATION TREATMENT: CPT

## 2024-03-22 PROCEDURE — 93010 ELECTROCARDIOGRAM REPORT: CPT | Performed by: INTERNAL MEDICINE

## 2024-03-22 PROCEDURE — 2500000001 HC RX 250 WO HCPCS SELF ADMINISTERED DRUGS (ALT 637 FOR MEDICARE OP)

## 2024-03-22 RX ORDER — IPRATROPIUM BROMIDE AND ALBUTEROL SULFATE 2.5; .5 MG/3ML; MG/3ML
3 SOLUTION RESPIRATORY (INHALATION) EVERY 4 HOURS PRN
Qty: 540 ML | Refills: 0 | Status: SHIPPED | OUTPATIENT
Start: 2024-03-22 | End: 2024-04-21

## 2024-03-22 RX ORDER — ONDANSETRON 4 MG/1
4 TABLET, FILM COATED ORAL EVERY 6 HOURS PRN
Qty: 30 TABLET | Refills: 0 | Status: SHIPPED | OUTPATIENT
Start: 2024-03-22 | End: 2024-04-21

## 2024-03-22 RX ADMIN — ALLOPURINOL 100 MG: 100 TABLET ORAL at 08:26

## 2024-03-22 RX ADMIN — LIDOCAINE 1 PATCH: 4 PATCH TOPICAL at 08:25

## 2024-03-22 RX ADMIN — SUCRALFATE 1 G: 1 TABLET ORAL at 05:56

## 2024-03-22 RX ADMIN — FLUOXETINE 20 MG: 10 CAPSULE ORAL at 08:26

## 2024-03-22 RX ADMIN — OXYCODONE HYDROCHLORIDE AND ACETAMINOPHEN 500 MG: 500 TABLET ORAL at 08:26

## 2024-03-22 RX ADMIN — ACETAMINOPHEN 975 MG: 325 TABLET ORAL at 14:52

## 2024-03-22 RX ADMIN — SELEXIPAG 600 MCG: 200 TABLET, COATED ORAL at 08:25

## 2024-03-22 RX ADMIN — GABAPENTIN 300 MG: 300 CAPSULE ORAL at 08:26

## 2024-03-22 RX ADMIN — LACOSAMIDE 100 MG: 100 TABLET, FILM COATED ORAL at 08:26

## 2024-03-22 RX ADMIN — SUCRALFATE 1 G: 1 TABLET ORAL at 01:23

## 2024-03-22 RX ADMIN — RIOCIGUAT 2.5 MG: 2.5 TABLET, FILM COATED ORAL at 08:24

## 2024-03-22 RX ADMIN — LAMOTRIGINE 100 MG: 100 TABLET ORAL at 08:25

## 2024-03-22 RX ADMIN — ACETAMINOPHEN 975 MG: 325 TABLET ORAL at 08:25

## 2024-03-22 RX ADMIN — SUMATRIPTAN SUCCINATE 100 MG: 100 TABLET ORAL at 03:30

## 2024-03-22 RX ADMIN — IPRATROPIUM BROMIDE AND ALBUTEROL SULFATE 3 ML: .5; 3 SOLUTION RESPIRATORY (INHALATION) at 11:13

## 2024-03-22 RX ADMIN — RIOCIGUAT 2.5 MG: 2.5 TABLET, FILM COATED ORAL at 14:53

## 2024-03-22 RX ADMIN — METOPROLOL TARTRATE 50 MG: 50 TABLET, FILM COATED ORAL at 08:26

## 2024-03-22 RX ADMIN — SUCRALFATE 1 G: 1 TABLET ORAL at 12:28

## 2024-03-22 RX ADMIN — FUROSEMIDE 40 MG: 40 TABLET ORAL at 08:26

## 2024-03-22 RX ADMIN — RIVAROXABAN 10 MG: 20 TABLET, FILM COATED ORAL at 08:25

## 2024-03-22 RX ADMIN — Medication 400 MG: at 08:26

## 2024-03-22 RX ADMIN — PANTOPRAZOLE SODIUM 40 MG: 40 TABLET, DELAYED RELEASE ORAL at 05:56

## 2024-03-22 ASSESSMENT — PAIN SCALES - GENERAL
PAINLEVEL_OUTOF10: 0 - NO PAIN
PAINLEVEL_OUTOF10: 8

## 2024-03-22 NOTE — CARE PLAN
Patient discharged to home with resumed home care. Her bedside nurse Lizbet BROWN removed her IV and reviewed her AVS with her. The patient's  packed up all her belongings. She received meds to bed from [facility] pharmacy for 2 new medications. Her  transported her to home.     Problem: Nutrition  Goal: Oral intake greater than 50%  Outcome: Adequate for Discharge  Goal: Consume prescribed supplement  Outcome: Adequate for Discharge  Goal: Adequate PO fluid intake  Outcome: Adequate for Discharge  Goal: Nutrition support goals are met within 48 hrs  Outcome: Adequate for Discharge  Goal: Nutrition support is meeting 75% of nutrient needs  Outcome: Adequate for Discharge  Goal: BG  mg/dL  Outcome: Adequate for Discharge  Goal: Lab values WNL  Outcome: Adequate for Discharge  Goal: Electrolytes WNL  Outcome: Adequate for Discharge  Goal: Promote healing  Outcome: Adequate for Discharge  Goal: Maintain stable weight  Outcome: Adequate for Discharge  Goal: Reduce weight from edema/fluid  Outcome: Adequate for Discharge  Goal: Gradual weight gain  Outcome: Adequate for Discharge  Goal: Improve ostomy output  Outcome: Adequate for Discharge     Problem: Respiratory  Goal: Minimal/no exertional discomfort or dyspnea this shift  Outcome: Adequate for Discharge  Goal: No signs of respiratory distress (eg. Use of accessory muscles. Peds grunting)  Outcome: Adequate for Discharge  Goal: Tolerate mechanical ventilation evidenced by VS/agitation level this shift  Outcome: Adequate for Discharge  Goal: Tolerate pulmonary toileting this shift  Outcome: Adequate for Discharge  Goal: Increase self care and/or family involvement in next 24 hours  Outcome: Adequate for Discharge     Problem: Pain  Goal: Performs ADL's with improved pain control throughout shift  Outcome: Adequate for Discharge  Goal: Participates in PT with improved pain control throughout the shift  Outcome: Adequate for Discharge  Goal: Free from opioid  side effects throughout the shift  Outcome: Adequate for Discharge  Goal: Free from acute confusion related to pain meds throughout the shift  Outcome: Adequate for Discharge

## 2024-03-22 NOTE — DISCHARGE SUMMARY
Discharge Diagnosis  Allergic reaction to drug, initial encounter    Issues Requiring Follow-Up  [ ] fup with PHTN clinic given downtitration of selexipag inpatient down to 600 mcg BID given nausea side effects    [ ] if with continued nausea, GI fup for consideration of EGD given ulcer history and inpatient nausea    [ ] volume status and consider titration of diuretics PRN (discharged on previous home dose of lasix 40 mg PO)     Test Results Pending At Discharge  Pending Labs       No current pending labs.            Hospital Course  Ms. Hein was admitted to the [facility] on 3/08/24 for N/V/D, HA, myalgias in s/o increasing selexipag dosage for pulmonary HTN. She endorsed the same symptoms for each uptitration, but the symptoms dissipate within 2-3 days. Currently endorsing the symptoms for 2-3 weeks w/ difficult tolerating PO and 10-15 lb weight loss. In the ED, patient was initiated on IV tylenol and zofran and rehydrated for soft BP. Selexipag (Uptravi) dosage was decreased to 1400 from 1600 and symptoms were monitored. Throughout stay, symptoms slowly dissipated but SOB persisted. Restarted diuresis day to day w/ 40 mg IV lasix pushes while monitoring RFP and UO. Additionally, patient required decrease Selexipag dosing to 600 micrograms BID w/ improvement of symptoms. Gastroenterology was consulted as selexipag dosing was being down titrated and assessed pt to be having side effects from selexipag and rec'd continued use of zofran with caution. Qtc was frequently monitored on EKGs with a peak Qtc of 502 on 3/19/24 (at which time pt was receiving zofran 3 times a day  minutes before each meal) and decreased back down to 491 on discharge day when the day before she was encouraged to only take zofran if she got nauseous (and only received Zofran day previous 3/21 for breakfast). Patient able to ambulate at baseline functional status and tolerate PO w/o vomiting on 3/22/24 and ok for discharge w/ follow up  w/ Dr. Johnson' clinic. Restarted on home medications including home diuretic 40 mg PO Lasix. TTE was performed without any acute changes and stable moderate MR, consistent elevated RV pressure and RA dilation.    Pt was encouraged to only use zofran in the future if needed for nausea/vomiting gets severe and to try to limit use to once a day.     Pertinent Physical Exam At Time of Discharge  Scheduled medications  selexipag, 600 mcg, oral, BID  [Held by provider] cariprazine, 1.5 mg, oral, Daily  riociguat, 2.5 mg, oral, TID  macitentan, 10 mg, oral, Daily  acetaminophen, 975 mg, oral, TID  allopurinol, 100 mg, oral, Daily  amitriptyline, 50 mg, oral, Nightly  ascorbic acid, 500 mg, oral, Daily  FLUoxetine, 20 mg, oral, Daily  furosemide, 40 mg, oral, Daily  gabapentin, 300 mg, oral, q AM  gabapentin, 600 mg, oral, Nightly  ipratropium-albuteroL, 3 mL, nebulization, TID  lacosamide, 100 mg, oral, BID  lamoTRIgine, 100 mg, oral, Daily  lidocaine, 1 patch, transdermal, Daily  magnesium oxide, 400 mg, oral, Daily  metoprolol tartrate, 50 mg, oral, BID  pantoprazole, 40 mg, oral, Daily before breakfast  rivaroxaban, 10 mg, oral, Daily  sucralfate, 1 g, oral, q6h SONIA      Continuous medications     PRN medications  PRN medications: benzocaine-menthol, diclofenac sodium, ondansetron, oxygen, SUMAtriptan    Physical Exam  General appearance/Constitutional: no acute distress  Eyes: sclera anicteric  Head & Neck: moist mucous membranes  Respiratory/Chest Wall: clear breath sounds bilaterally, on 5L NC  Cardiovascular: regular rate and rhythm, warm extremities  Neurologic: awake, alert  Extremities: no lower extremity edema  Psychiatric: appropriate mood  Lines/Drains: 20 gauge pIV L forearm    Home Medications     Medication List      START taking these medications     ipratropium-albuteroL 0.5-2.5 mg/3 mL nebulizer solution; Commonly known   as: Duo-Neb; Take 3 mL by nebulization every 4 hours if needed for   wheezing.    ondansetron 4 mg tablet; Commonly known as: Zofran; Take 1 tablet (4 mg)   by mouth every 6 hours if needed for nausea or vomiting.     CHANGE how you take these medications     * gabapentin 300 mg capsule; Commonly known as: Neurontin; TAKE 1   CAPSULE BY MOUTH ONCE DAILY; What changed: when to take this   * gabapentin 600 mg tablet; Commonly known as: Neurontin; TAKE 1 TABLET   BY MOUTH ONCE DAILY; What changed: when to take this   selexipag 200 mcg tablet; Commonly known as: Uptravi; Take 3 tablets   (600 mcg) by mouth 2 times a day. Do not crush, chew, or split.; What   changed: medication strength, how much to take  * This list has 2 medication(s) that are the same as other medications   prescribed for you. Read the directions carefully, and ask your doctor or   other care provider to review them with you.     CONTINUE taking these medications     Adempas 2.5 mg tablet; Generic drug: riociguat   albuterol 90 mcg/actuation inhaler; INHALE 1 PUFF EVERY 6 HOURS AS   NEEDED   allopurinol 100 mg tablet; Commonly known as: Zyloprim; TAKE 1 TABLET BY   MOUTH DAILY   amitriptyline 50 mg tablet; Commonly known as: Elavil; TAKE 1 TABLET BY   MOUTH AT BEDTIME   ascorbic acid 500 mg tablet; Commonly known as: Vitamin C   blood sugar diagnostic strip   butalbitaL-acetaminophen  mg capsule   Emgality Syringe 120 mg/mL prefilled syringe; Generic drug:   galcanezumab; INJECT 1 ML UNDER THE SKIN ONCE MONTHLY   ergocalciferol 1.25 MG (01367 UT) capsule; Commonly known as: Vitamin   D-2; TAKE 1 CAPSULE BY MOUTH ONCE WEEKLY   FLUoxetine 20 mg tablet; Commonly known as: PROzac; TAKE 1 TABLET BY   MOUTH ONCE DAILY   furosemide 40 mg tablet; Commonly known as: Lasix; TAKE 1 TABLET BY   MOUTH ONCE DAILY   lacosamide 100 mg tablet; Commonly known as: Vimpat; TAKE 1 TABLET BY   MOUTH TWO TIMES A DAY FOR 30 DAYS   lamoTRIgine 100 mg tablet; Commonly known as: LaMICtal   loratadine 10 mg tablet; Commonly known as: Claritin    methocarbamol 750 mg tablet; Commonly known as: Robaxin; TAKE 1 TABLET   BY MOUTH EVERY 8 HOURS   metoprolol tartrate 25 mg tablet; Commonly known as: Lopressor; Take 2   tablets (50 mg) by mouth 2 times a day.   multivitamin capsule   Nurtec ODT 75 mg tablet,disintegrating; Generic drug: rimegepant;   DISSOLVE 1 TABLET IN MOUTH ON THE TONGUE EVERY OTHER DAY   omeprazole 40 mg DR capsule; Commonly known as: PriLOSEC   Opsumit 10 mg tablet tablet; Generic drug: macitentan   sucralfate 1 gram tablet; Commonly known as: Carafate; TAKE 1 TABLET BY   MOUTH EVERY 6 HOURS ON AN EMPTY STOMACH   SUMAtriptan 100 mg tablet; Commonly known as: Imitrex; TAKE 1 TABLET BY   MOUTH TWO TIMES A DAY AS NEEDED   Vraylar 1.5 mg capsule; Generic drug: cariprazine   Xarelto 10 mg tablet; Generic drug: rivaroxaban; TAKE 1 TABLET BY MOUTH   ONCE DAILY WITH FOOD     STOP taking these medications     losartan 25 mg tablet; Commonly known as: Cozaar   potassium chloride CR 10 mEq ER tablet; Commonly known as: Klor-Con       Outpatient Follow-Up  Future Appointments   Date Time Provider Department Center   4/4/2024  3:00 PM [facility] STRESS ROOM [id number] [facility]   4/23/2024  3:30 PM RACHELE Chang-CNP [id number] [facility]   5/16/2024  4:00 PM Silvio Johnson DO [id number] [facility]   8/14/2024  3:40 PM Carolina Martin MD [id number] [facility]       Radha Simon MD

## 2024-03-22 NOTE — SIGNIFICANT EVENT
Rapid Response RN Note    Rapid response following up with bedside RN or RADAR score 7 due to the following VS: T 36.1 °Celsius; HR 75 ; RR 17; BP 95/49; SPO2 91%.     Reviewed above VS with bedside RN.  VS within patient's current trends.  Primary RN to recheck pulseOx. No interventions by rapid response team indicated at this time.      Primary RN encouraged to page rapid response for any concerns or acute change in condition/VS.

## 2024-03-22 NOTE — SIGNIFICANT EVENT
Rapid Response RN Note     03/22/24 1202   Onset Documentation   Rapid Response Initiated By Radar auto page   Location/Room [facility]   Pager Time 1202   Arrival Time 1210   Event End Time 1226   Level II Called No   Primary Reason for Call Radar auto page  (Score 6)       Rapid response RN at bedside for RADAR score 6 due to the following VS: T 36 °Celsius; HR 71 ; RR 17; BP 94/41; SPO2 95%.     Reviewed above VS with bedside RN.  VS within patient's current trends.  Patient denied pain, shortness of breath, dizziness or lightheadedness.  No interventions by rapid response team indicated at this time.      Staff to page rapid response for any concerns or acute change in condition/VS.

## 2024-03-22 NOTE — PROGRESS NOTES
03/22/24 1149   Discharge Planning   Home or Post Acute Services In home services   Type of Home Care Services Home health aide   Patient expects to be discharged to: home   Does the patient need discharge transport arranged? No       Patient will discharge to home today. She will resume services with her home health aide when she discharges. [employer] will deliver her rollator to her home today. The home oxygen concentrator and nebulizer machine was delivered to the home earlier this week. Patient spouse will transport her home.

## 2024-03-22 NOTE — TELEPHONE ENCOUNTER
Spoke with patient, advised her I had confirmation from [facility] that they are sending her Uptravi 600 mcq BID dose to her home. Pt states she will be discharged today. Advised patient to stay at 600 mcq BID, no titration unless ordered by Dr. Johnson. Patient to call into [facility] office at [phone] in one week to check in.  sent message to schedule FUV and 6MWT.

## 2024-03-22 NOTE — CARE PLAN
The patient's goals for the shift include      The clinical goals for the shift include patient emmanuelle have a decrease in n/v/d      Problem: Discharge Planning  Goal: Discharge to home or other facility with appropriate resources  Outcome: Met     Problem: Chronic Conditions and Co-morbidities  Goal: Patient's chronic conditions and co-morbidity symptoms are monitored and maintained or improved  Outcome: Met     Problem: Respiratory  Goal: Clear secretions with interventions this shift  Outcome: Met     Problem: Respiratory  Goal: Minimize anxiety/maximize coping throughout shift  Outcome: Met

## 2024-03-24 LAB
ATRIAL RATE: 64 BPM
P AXIS: 11 DEGREES
P OFFSET: 177 MS
P ONSET: 119 MS
PR INTERVAL: 200 MS
Q ONSET: 219 MS
QRS COUNT: 11 BEATS
QRS DURATION: 90 MS
QT INTERVAL: 476 MS
QTC CALCULATION(BAZETT): 491 MS
QTC FREDERICIA: 486 MS
R AXIS: 115 DEGREES
T AXIS: 54 DEGREES
T OFFSET: 457 MS
VENTRICULAR RATE: 64 BPM

## 2024-03-25 ENCOUNTER — PATIENT OUTREACH (OUTPATIENT)
Dept: CARE COORDINATION | Facility: CLINIC | Age: 49
End: 2024-03-25
Payer: COMMERCIAL

## 2024-03-25 PROCEDURE — RXMED WILLOW AMBULATORY MEDICATION CHARGE

## 2024-03-25 NOTE — PROGRESS NOTES
Outreach call to patient to support a smooth transition of care from recent admission.  Left voicemail message for patient with my contact information.  Chat bot (conversa) not assigned-nursing judgement

## 2024-04-04 ENCOUNTER — APPOINTMENT (OUTPATIENT)
Dept: CARDIOLOGY | Facility: CLINIC | Age: 49
End: 2024-04-04
Payer: COMMERCIAL

## 2024-04-05 ENCOUNTER — PHARMACY VISIT (OUTPATIENT)
Dept: PHARMACY | Facility: CLINIC | Age: 49
End: 2024-04-05
Payer: COMMERCIAL

## 2024-04-12 DIAGNOSIS — I27.20 PULMONARY HYPERTENSION (MULTI): ICD-10-CM

## 2024-04-13 RX ORDER — RIOCIGUAT 2.5 MG/1
2.5 TABLET, FILM COATED ORAL 3 TIMES DAILY
Qty: 90 TABLET | Refills: 11 | Status: SHIPPED | OUTPATIENT
Start: 2024-04-13 | End: 2024-04-16 | Stop reason: SDUPTHER

## 2024-04-15 ENCOUNTER — DOCUMENTATION (OUTPATIENT)
Dept: PULMONOLOGY | Facility: HOSPITAL | Age: 49
End: 2024-04-15
Payer: COMMERCIAL

## 2024-04-15 NOTE — PROGRESS NOTES
Received call from [employer] Verbal order per Dr. Johnson for testing to be completed for approval of 6L portable O2 concentrator.

## 2024-04-16 DIAGNOSIS — I27.20 PULMONARY HYPERTENSION (MULTI): ICD-10-CM

## 2024-04-16 RX ORDER — RIOCIGUAT 2.5 MG/1
2.5 TABLET, FILM COATED ORAL 3 TIMES DAILY
Qty: 90 TABLET | Refills: 11 | Status: SHIPPED | OUTPATIENT
Start: 2024-04-16 | End: 2024-05-02 | Stop reason: SDUPTHER

## 2024-04-19 NOTE — PROGRESS NOTES
Chief Complaint/Reason for Visit:   Patient is coming in today for a 2 month cardiovascular follow up.     History Of Present Illness:    Ms. Hein is coming today as a 2-month cardiovascular follow-up we have followed this patient previously for right-sided heart failure, palpitations, and paroxysmal SVT.  She also has a history of primary pulmonary hypertension and follows with Dr. Johnson, recurrent PEs treated with Xarelto, and history of a permanent pacemaker that was placed in [place]?  Vasovagal syncope.  She was recently hospitalized from 3/7/2024 through 3/20/2024 due to side effects from her pulmonary hypertension and drug adjustment (Selexipag).      Past Medical History:  She has a past medical history of Heart failure (Multi), Hypoglycemia, Personal history of other diseases of the female genital tract (2016), and Personal history of pulmonary embolism (2016).    Past Surgical History:  She has a past surgical history that includes Hysterectomy (2016); Other surgical history (2016);  section, classic (2016); Gastric bypass (2017); and Cardiac catheterization (N/A, 10/31/2023).      Social History:  She reports that she has never smoked. She has never used smokeless tobacco. She reports that she does not currently use alcohol. She reports current drug use. Drug: Marijuana.    Family History:  No family history on file.     Allergies:  Iodine and Iodine (bulk)    Medications:  Current Outpatient Medications   Medication Instructions    Adempas 2.5 mg, oral, 3 times daily    albuterol 90 mcg/actuation inhaler INHALE 1 PUFF EVERY 6 HOURS AS NEEDED    allopurinol (Zyloprim) 100 mg tablet TAKE 1 TABLET BY MOUTH DAILY    amitriptyline (Elavil) 50 mg tablet TAKE 1 TABLET BY MOUTH AT BEDTIME    ascorbic acid (VITAMIN C) 500 mg, oral, Daily    blood sugar diagnostic strip as directed    butalbitaL-acetaminophen  mg capsule Take 1 capsule by mouth every 4 hours if  needed (migraine).    cariprazine (Vraylar) 1.5 mg capsule Take 1 capsule (1.5 mg) by mouth once daily.    ergocalciferol (Vitamin D-2) 1.25 MG (49083 UT) capsule TAKE 1 CAPSULE BY MOUTH ONCE WEEKLY    FLUoxetine (PROzac) 20 mg tablet TAKE 1 TABLET BY MOUTH ONCE DAILY    furosemide (Lasix) 40 mg tablet TAKE 1 TABLET BY MOUTH ONCE DAILY    gabapentin (Neurontin) 300 mg capsule TAKE 1 CAPSULE BY MOUTH ONCE DAILY    gabapentin (Neurontin) 600 mg tablet TAKE 1 TABLET BY MOUTH ONCE DAILY    galcanezumab (Emgality) 120 mg/mL prefilled syringe INJECT 1 ML UNDER THE SKIN ONCE MONTHLY    ipratropium-albuteroL (Duo-Neb) 0.5-2.5 mg/3 mL nebulizer solution 3 mL, nebulization, Every 4 hours PRN    lacosamide (Vimpat) 100 mg tablet TAKE 1 TABLET BY MOUTH TWO TIMES A DAY FOR 30 DAYS    lamoTRIgine (LaMICtal) 100 mg tablet Take 1 tablet (100 mg) by mouth once daily at bedtime.    loratadine (Claritin) 10 mg tablet 1 tablet, oral, Daily    methocarbamol (Robaxin) 750 mg tablet TAKE 1 TABLET BY MOUTH EVERY 8 HOURS    metoprolol tartrate (LOPRESSOR) 50 mg, oral, 2 times daily    multivitamin capsule 1 capsule, oral, Daily RT    omeprazole (PriLOSEC) 40 mg DR capsule Take 1 capsule every day by oral route.    ondansetron (ZOFRAN) 4 mg, oral, Every 6 hours PRN    Opsumit 10 mg, oral, Daily    rimegepant (NURTEC) 75 mg tablet,disintegrating DISSOLVE 1 TABLET IN MOUTH ON THE TONGUE EVERY OTHER DAY    rivaroxaban (Xarelto) 10 mg tablet TAKE 1 TABLET BY MOUTH ONCE DAILY WITH FOOD    selexipag (UPTRAVI) 600 mcg, oral, 2 times daily, Do not crush, chew, or split.    sucralfate (Carafate) 1 gram tablet TAKE 1 TABLET BY MOUTH EVERY 6 HOURS ON AN EMPTY STOMACH    SUMAtriptan (Imitrex) 100 mg tablet TAKE 1 TABLET BY MOUTH TWO TIMES A DAY AS NEEDED       Review of Systems:  ROS     Vitals  Visit Vitals  Smoking Status Never        Physical Exam:  Physical Exam      Last Labs:  CBC -  Lab Results   Component Value Date    WBC 3.3 (L) 03/19/2024     HGB 9.8 (L) 03/19/2024    HCT 30.5 (L) 03/19/2024    MCV 84 03/19/2024     03/19/2024     Lab Results   Component Value Date    GLUCOSE 93 03/21/2024    CALCIUM 8.9 03/21/2024     03/21/2024    K 3.8 03/21/2024    CO2 28 03/21/2024     03/21/2024    BUN 14 03/21/2024    CREATININE 0.69 03/21/2024      CMP -  Lab Results   Component Value Date    CALCIUM 8.9 03/21/2024    PHOS 4.9 03/21/2024    PROT 5.3 (L) 03/09/2024    ALBUMIN 3.5 03/21/2024    AST 12 03/09/2024    ALT 10 03/09/2024    ALKPHOS 90 03/09/2024    BILITOT 0.3 03/09/2024       LIPID PANEL -   Lab Results   Component Value Date    CHOL 226 (H) 04/16/2018    TRIG 86 04/16/2018    HDL 60.2 04/16/2018    CHHDL 3.8 04/16/2018    LDLF 149 (H) 04/16/2018    VLDL 17 04/16/2018       Lab Results   Component Value Date     (H) 03/11/2024    HGBA1C 5.4 05/12/2023       Last Cardiology Tests:    Echo:3-11-24  CONCLUSIONS:   1. Poorly visualized anatomical structures due to suboptimal image quality.   2. Spectral Doppler shows a pseudonormal pattern of left ventricular diastolic filling.   3. Left ventricular systolic function is normal with a 60-65% estimated ejection fraction.   4. Right ventricular pressure overload.   5. There is an elevated mean left atrial pressure.   6. The left atrium is mild to moderately dilated.   7. The right atrium is severely dilated.   8. Mild tricuspid regurgitation visualized.   9. Moderate to severely elevated right ventricular systolic pressure.  10. Normal aortic root.      {Lab/Diag/Rad Review:34782}    Assessment/Plan:  1-palpitations-symptoms seem secondary to PVCs but she also has prior history of SVTs, nonsustained VT's seen in the current monitor, as well as PACs.  At this time I opted for increasing the beta-blocker.  She has a structurally normal heart.  I will recheck her electrolytes magnesium and TSH.  I will arrange for a stress test to rule out ischemic heart disease.  If symptoms do not  subside with the beta-blockers we will refer her to EP for further evaluation and treatment.     2-presence of permanent pacemaker-unclear why this was implanted seems malignant syncope with cardioinhibitory response need to get records from [place].  Established in device clinic.     3-shortness of breath and chest pain-possibly pulmonary in origin we are ruling out significant coronary artery disease with stress test see above.       Petty Kwan, APRN-CNP

## 2024-04-23 ENCOUNTER — APPOINTMENT (OUTPATIENT)
Dept: CARDIOLOGY | Facility: CLINIC | Age: 49
End: 2024-04-23
Payer: COMMERCIAL

## 2024-04-26 ENCOUNTER — PATIENT OUTREACH (OUTPATIENT)
Dept: CARE COORDINATION | Facility: CLINIC | Age: 49
End: 2024-04-26
Payer: COMMERCIAL

## 2024-04-26 NOTE — PROGRESS NOTES
[facility] [place] 30 day ALVIN follow up: call placed and VMM left.  Will close the ALVIN program.  If Jazmin returns call, will re-open in complex care

## 2024-04-29 PROCEDURE — RXMED WILLOW AMBULATORY MEDICATION CHARGE

## 2024-04-29 NOTE — PROGRESS NOTES
History Of Present Illness  Jazmin Hein is a 49 y.o. female presenting with pulmonary arterial hypertension last seen in clinic 11/16/2024 for iCEPT results. Patient is NYHA Functional Class III and WHO Group 1.        Margo Frye, RN Coordinator 4/29/2024     Edit     Copy     Expand All Collapse All  History Of Present Illness  Jazmin Hein is a 49 y.o. female presenting with pulmonary arterial hypertension last seen in clinic 11/16/2024 for iCEPT results. Patient is NYHA Functional Class 2-3 and WHO Group 1.     Ms. Hein was admitted to the [facility] on 3/08/24 for N/V/D, HA, myalgias in s/o increasing selexipag dosage for pulmonary HTN. She endorsed the same symptoms for each uptitration, but the symptoms dissipate within 2-3 days. Currently endorsing the symptoms for 2-3 weeks w/ difficult tolerating PO and 10-15 lb weight loss. In the ED, patient was initiated on IV tylenol and zofran and rehydrated for soft BP. Selexipag (Uptravi) dosage was decreased to 1400 from 1600 and symptoms were monitored. Throughout stay, symptoms slowly dissipated but SOB persisted. Restarted diuresis day to day w/ 40 mg IV lasix pushes while monitoring RFP and UO. Additionally, patient required decrease Selexipag dosing to 600 micrograms BID w/ improvement of symptoms. Gastroenterology was consulted as selexipag dosing was being down titrated and assessed pt to be having side effects from selexipag and rec'd continued use of zofran with caution. Qtc was frequently monitored on EKGs with a peak Qtc of 502 on 3/19/24 (at which time pt was receiving zofran 3 times a day  minutes before each meal) and decreased back down to 491 on discharge day when the day before she was encouraged to only take zofran if she got nauseous (and only received Zofran day previous 3/21 for breakfast). Patient able to ambulate at baseline functional status and tolerate PO w/o vomiting on 3/22/24 and ok for discharge w/ follow up w/   [facility]. Restarted on home medications including home diuretic 40 mg PO Lasix. TTE was performed without any acute changes and stable moderate MR, consistent elevated RV pressure and RA dilation.    PAH Treatment: Adempas 2.5 mg TID, macitentan 10 mg,  selexipag 600 mcg  Infusion site: N/A  Treatment history: N/A     Testing today 6MWT and labs.      Interval History   (5/6/24)  Presents to with shortness of breath with activity. Using 6 liters nc continuously. C/o fatigue, edema to both ankles noted. Intermittent bouts of dizziness, lightheadedness and headaches after taking Uptravi. Nausea remains and has zofran PRN. Diarrhea is constant and treated with Immodium twice a day. 600mg BID.      Past Medical History      Patient Active Problem List   Diagnosis    Dyspnea on exertion    SOB (shortness of breath) on exertion    Vitamin D deficiency    Ureterolithiasis    Transaminitis    TIA (transient ischemic attack)    Tachycardia    Weakness    Sleep apnea    Chronic pulmonary embolism (Multi)    Pulmonary hypertension (Multi)    Post-operative pain    Paresthesia of skin    Dependence on supplemental oxygen    Obstructive sleep apnea of adult    Obesity    Nausea and vomiting    Migraine headache    Hypoglycemia after GI (gastrointestinal) surgery (HHS-HCC)    Lower back pain    Lipids abnormal    Leukopenia    Left arm weakness    Intermittent urinary incontinence    Insomnia    Hypoglycemia    Hypertension    Flushing    HLD (hyperlipidemia)    History of ovarian cancer    Heartburn    Arthritis    Gastric ulcer    Fibromyalgia    Electrolyte and fluid disorder    Drug-induced liver injury    Dizziness    Disease due to severe acute respiratory syndrome coronavirus 2 (SARS-CoV-2)    Daytime sleepiness    Chronic gouty arthritis    Chronic gout of foot due to renal impairment    Cholecystitis, chronic    Bipolar disorder (Multi)    Bilateral swelling of feet    Bariatric surgery status    Arm paresthesia,  left    Allergies    Acute unilateral obstructive uropathy    Abnormal findings on cardiac catheterization    Abnormal echocardiogram    Abdominal right upper quadrant tenderness    Abdominal pain, epigastric    Abdominal pain of multiple sites    Pacemaker    Chronic respiratory failure with hypoxia (Multi)    Paroxysmal supraventricular tachycardia (CMS-HCC)    PAC (premature atrial contraction)    PVC (premature ventricular contraction)    NSVT (nonsustained ventricular tachycardia) (Multi)    Palpitations    Chest pain    Allergic reaction to drug, initial encounter         Surgical History  She has a past surgical history that includes Hysterectomy (2016); Other surgical history (2016);  section, classic (2016); Gastric bypass (2017); and Cardiac catheterization (N/A, 10/31/2023).     Social History  She reports that she has never smoked. She has never used smokeless tobacco. She reports that she does not currently use alcohol. She reports current drug use. Drug: Marijuana.     Family History  Family History   No family history on file.        Medications        Current Outpatient Medications:     Adempas 2.5 mg tablet, Take 1 tablet (2.5 mg) by mouth 3 times a day., Disp: 90 tablet, Rfl: 11    albuterol 90 mcg/actuation inhaler, INHALE 1 PUFF EVERY 6 HOURS AS NEEDED, Disp: 25.5 g, Rfl: 3    allopurinol (Zyloprim) 100 mg tablet, TAKE 1 TABLET BY MOUTH DAILY, Disp: 90 tablet, Rfl: 3    amitriptyline (Elavil) 50 mg tablet, TAKE 1 TABLET BY MOUTH AT BEDTIME, Disp: 90 tablet, Rfl: 3    ascorbic acid (Vitamin C) 500 mg tablet, Take 1 tablet (500 mg) by mouth once daily., Disp: , Rfl:     blood sugar diagnostic strip, as directed, Disp: , Rfl:     butalbitaL-acetaminophen  mg capsule, Take 1 capsule by mouth every 4 hours if needed (migraine)., Disp: , Rfl:     cariprazine (Vraylar) 1.5 mg capsule, Take 1 capsule (1.5 mg) by mouth once daily., Disp: , Rfl:     ergocalciferol  (Vitamin D-2) 1.25 MG (80564 UT) capsule, TAKE 1 CAPSULE BY MOUTH ONCE WEEKLY, Disp: 12 capsule, Rfl: 3    FLUoxetine (PROzac) 20 mg tablet, TAKE 1 TABLET BY MOUTH ONCE DAILY, Disp: 90 tablet, Rfl: 3    furosemide (Lasix) 40 mg tablet, TAKE 1 TABLET BY MOUTH ONCE DAILY, Disp: 90 tablet, Rfl: 3    gabapentin (Neurontin) 300 mg capsule, TAKE 1 CAPSULE BY MOUTH ONCE DAILY (Patient taking differently: Take 1 capsule (300 mg) by mouth once daily in the morning.), Disp: 90 capsule, Rfl: 3    gabapentin (Neurontin) 600 mg tablet, TAKE 1 TABLET BY MOUTH ONCE DAILY (Patient taking differently: Take 1 tablet (600 mg) by mouth once daily at bedtime.), Disp: 90 tablet, Rfl: 3    galcanezumab (Emgality) 120 mg/mL prefilled syringe, INJECT 1 ML UNDER THE SKIN ONCE MONTHLY, Disp: 3 mL, Rfl: 3    ipratropium-albuteroL (Duo-Neb) 0.5-2.5 mg/3 mL nebulizer solution, Take 3 mL by nebulization every 4 hours if needed for wheezing., Disp: 540 mL, Rfl: 0    lacosamide (Vimpat) 100 mg tablet, TAKE 1 TABLET BY MOUTH TWO TIMES A DAY FOR 30 DAYS, Disp: 60 each, Rfl: 3    lamoTRIgine (LaMICtal) 100 mg tablet, Take 1 tablet (100 mg) by mouth once daily at bedtime., Disp: , Rfl:     loratadine (Claritin) 10 mg tablet, Take 1 tablet (10 mg) by mouth once daily., Disp: , Rfl:     macitentan (Opsumit) 10 mg tablet tablet, Take 1 tablet (10 mg) by mouth once daily., Disp: , Rfl:     methocarbamol (Robaxin) 750 mg tablet, TAKE 1 TABLET BY MOUTH EVERY 8 HOURS, Disp: 270 tablet, Rfl: 3    metoprolol tartrate (Lopressor) 25 mg tablet, Take 2 tablets (50 mg) by mouth 2 times a day., Disp: 360 tablet, Rfl: 3    multivitamin capsule, Take 1 capsule by mouth once daily., Disp: , Rfl:     omeprazole (PriLOSEC) 40 mg DR capsule, Take 1 capsule every day by oral route., Disp: , Rfl:     rimegepant (NURTEC) 75 mg tablet,disintegrating, DISSOLVE 1 TABLET IN MOUTH ON THE TONGUE EVERY OTHER DAY, Disp: 96 tablet, Rfl: 0    rivaroxaban (Xarelto) 10 mg tablet, TAKE 1  "TABLET BY MOUTH ONCE DAILY WITH FOOD, Disp: 90 tablet, Rfl: 3    selexipag (Uptravi) 200 mcg tablet, Take 3 tablets (600 mcg) by mouth 2 times a day. Do not crush, chew, or split., Disp: , Rfl:     sucralfate (Carafate) 1 gram tablet, TAKE 1 TABLET BY MOUTH EVERY 6 HOURS ON AN EMPTY STOMACH, Disp: 360 tablet, Rfl: 3    SUMAtriptan (Imitrex) 100 mg tablet, TAKE 1 TABLET BY MOUTH TWO TIMES A DAY AS NEEDED, Disp: 60 tablet, Rfl: 11      Allergies  Iodine and Iodine (bulk)     Review of Systems     Last Recorded Vitals  There were no vitals taken for this visit.             Relevant Results     6MWT  (2024)  SpO2: 98-95%  HR:   Wilfredo:0-5  Actual Meters: 178 meters     6MWT (23)  SpO2: 97% - 95% on RA  HR: 100 - 126  Wilfredo: 1 - 5  Actual 189m     6MWT (2023)  SpO2: 100-97% 4LPM  HR:   Wilfredo-100  Actual meters: 146       V/Q scan (2023) The perfusion of both lungs is within normal limits with no evidence  for acute pulmonary embolism. Very low probability.     V/Q (3/27/23): no discrete ventilation abnormalities, no bronchopulmonary segment conforming perfusion defects; Dr. Brar notes \"segmental perfusion defect in right mid lung zones that appear mismatched\"         RHC (23):   PAp 60/23 (33)  PW 5  C.O. 4.52        PFTs (3/20/23): available for review     Echo (3/12/2024)  Right Ventricle: The right ventricle is normal in size. There is normal right ventricular global systolic function. A device is visualized in the right ventricle.  Right Atrium: The right atrium is severely dilated. There is a device visualized in the right atrium.        Assessment/Plan   1) Pulmonary  a. Normal PFTs and DLCO  b. Essentially normal CT  c. Paradoxically reported low oxygen levels although SpO2 walking for me today = 97% on 4l/min, walked on room air 2023 with desat to 95% on RA.   d. Stated remote () unprovoked PE with recurrence immediately after ceasing anticoagulation. Interpretation " of V/Q with ? RML defect (Peripherally) I do not see it. Original interpretation did not see it. It is not common for presentation this many years after PE. Similarly even if there was small peripheral R defect, it does not explain profound report desat and exercise intolerance displayed. SEE BELOW UNDER CARDIOVASCULAR, NEW CATH CONSISTENT WITH PAH.  e. Mild ELENA, AHI =6 from study, lowest SpO2= 87% relatively briefly.      2) Cardiovascular  a. Paroxysmal resting SVT. (+) ZIO patch referred to EP.   b. Original Resting PA mean of 32 reported which was confusing given,  progression to 47 mm HG consistent with IPAH, FC III. Given adempas, needs additional therapy. Exercise cath 47-> 57, no change in PCWP.  c. ? TIA remote.     3) Neuropsych  a. ON multiple med  b. migranes.     May have fatigue elements of long COVID      Plan  1) Event monitor (+) for tachycardia, referred to EP, patient not called, EPIC instituted, re-referred.   2) Add macitentan and selexipag sequentially. Macitentan first, then selexipag in about 4 weeks, follow up 6-8 weeks with 6 MW and echo.                                       PAH Treatment: Adempas 2.5 mg TID, macitentan 10 mg,  selexipag 600 mcg  Infusion site: N/A  Treatment history: N/A    Testing today 6MWT and labs.      Interval History   Ms. Hein was admitted to the [facility] on 3/08/24 for N/V/D, HA, myalgias in s/o increasing selexipag dosage for pulmonary HTN. She endorsed the same symptoms for each uptitration, but the symptoms dissipate within 2-3 days. Currently endorsing the symptoms for 2-3 weeks w/ difficult tolerating PO and 10-15 lb weight loss. In the ED, patient was initiated on IV tylenol and zofran and rehydrated for soft BP. Selexipag (Uptravi) dosage was decreased to 1400 from 1600 and symptoms were monitored. Throughout stay, symptoms slowly dissipated but SOB persisted. Restarted diuresis day to day w/ 40 mg IV lasix pushes while monitoring RFP and UO.  Additionally, patient required decrease Selexipag dosing to 600 micrograms BID w/ improvement of symptoms. Gastroenterology was consulted as selexipag dosing was being down titrated and assessed pt to be having side effects from selexipag and rec'd continued use of zofran with caution. Qtc was frequently monitored on EKGs with a peak Qtc of 502 on 3/19/24 (at which time pt was receiving zofran 3 times a day  minutes before each meal) and decreased back down to 491 on discharge day when the day before she was encouraged to only take zofran if she got nauseous (and only received Zofran day previous 3/21 for breakfast). Patient able to ambulate at baseline functional status and tolerate PO w/o vomiting on 3/22/24 and ok for discharge w/ follow up w/ Dr. Johnson' clinic. Restarted on home medications including home diuretic 40 mg PO Lasix. TTE was performed without any acute changes and stable moderate MR, consistent elevated RV pressure and RA dilation.     Past Medical History  Patient Active Problem List   Diagnosis    Dyspnea on exertion    SOB (shortness of breath) on exertion    Vitamin D deficiency    Ureterolithiasis    Transaminitis    TIA (transient ischemic attack)    Tachycardia    Weakness    Sleep apnea    Chronic pulmonary embolism (Multi)    Pulmonary hypertension (Multi)    Post-operative pain    Paresthesia of skin    Dependence on supplemental oxygen    Obstructive sleep apnea of adult    Obesity    Nausea and vomiting    Migraine headache    Hypoglycemia after GI (gastrointestinal) surgery (HHS-HCC)    Lower back pain    Lipids abnormal    Leukopenia    Left arm weakness    Intermittent urinary incontinence    Insomnia    Hypoglycemia    Hypertension    Flushing    HLD (hyperlipidemia)    History of ovarian cancer    Heartburn    Arthritis    Gastric ulcer    Fibromyalgia    Electrolyte and fluid disorder    Drug-induced liver injury    Dizziness    Disease due to severe acute respiratory  syndrome coronavirus 2 (SARS-CoV-2)    Daytime sleepiness    Chronic gouty arthritis    Chronic gout of foot due to renal impairment    Cholecystitis, chronic    Bipolar disorder (Multi)    Bilateral swelling of feet    Bariatric surgery status    Arm paresthesia, left    Allergies    Acute unilateral obstructive uropathy    Abnormal findings on cardiac catheterization    Abnormal echocardiogram    Abdominal right upper quadrant tenderness    Abdominal pain, epigastric    Abdominal pain of multiple sites    Pacemaker    Chronic respiratory failure with hypoxia (Multi)    Paroxysmal supraventricular tachycardia (CMS-HCC)    PAC (premature atrial contraction)    PVC (premature ventricular contraction)    NSVT (nonsustained ventricular tachycardia) (Multi)    Palpitations    Chest pain    Allergic reaction to drug, initial encounter        Surgical History  She has a past surgical history that includes Hysterectomy (2016); Other surgical history (2016);  section, classic (2016); Gastric bypass (2017); and Cardiac catheterization (N/A, 10/31/2023).     Social History  She reports that she has never smoked. She has never used smokeless tobacco. She reports that she does not currently use alcohol. She reports current drug use. Drug: Marijuana.    Family History  No family history on file.     Medications      Current Outpatient Medications:     Adempas 2.5 mg tablet, Take 1 tablet (2.5 mg) by mouth 3 times a day., Disp: 90 tablet, Rfl: 11    albuterol 90 mcg/actuation inhaler, INHALE 1 PUFF EVERY 6 HOURS AS NEEDED, Disp: 25.5 g, Rfl: 3    allopurinol (Zyloprim) 100 mg tablet, TAKE 1 TABLET BY MOUTH DAILY, Disp: 90 tablet, Rfl: 3    amitriptyline (Elavil) 50 mg tablet, TAKE 1 TABLET BY MOUTH AT BEDTIME, Disp: 90 tablet, Rfl: 3    ascorbic acid (Vitamin C) 500 mg tablet, Take 1 tablet (500 mg) by mouth once daily., Disp: , Rfl:     blood sugar diagnostic strip, as directed, Disp: , Rfl:      butalbitaL-acetaminophen  mg capsule, Take 1 capsule by mouth every 4 hours if needed (migraine)., Disp: , Rfl:     cariprazine (Vraylar) 1.5 mg capsule, Take 1 capsule (1.5 mg) by mouth once daily., Disp: , Rfl:     ergocalciferol (Vitamin D-2) 1.25 MG (26858 UT) capsule, TAKE 1 CAPSULE BY MOUTH ONCE WEEKLY, Disp: 12 capsule, Rfl: 3    FLUoxetine (PROzac) 20 mg tablet, TAKE 1 TABLET BY MOUTH ONCE DAILY, Disp: 90 tablet, Rfl: 3    furosemide (Lasix) 40 mg tablet, TAKE 1 TABLET BY MOUTH ONCE DAILY, Disp: 90 tablet, Rfl: 3    gabapentin (Neurontin) 300 mg capsule, TAKE 1 CAPSULE BY MOUTH ONCE DAILY (Patient taking differently: Take 1 capsule (300 mg) by mouth once daily in the morning.), Disp: 90 capsule, Rfl: 3    gabapentin (Neurontin) 600 mg tablet, TAKE 1 TABLET BY MOUTH ONCE DAILY (Patient taking differently: Take 1 tablet (600 mg) by mouth once daily at bedtime.), Disp: 90 tablet, Rfl: 3    galcanezumab (Emgality) 120 mg/mL prefilled syringe, INJECT 1 ML UNDER THE SKIN ONCE MONTHLY, Disp: 3 mL, Rfl: 3    ipratropium-albuteroL (Duo-Neb) 0.5-2.5 mg/3 mL nebulizer solution, Take 3 mL by nebulization every 4 hours if needed for wheezing., Disp: 540 mL, Rfl: 0    lacosamide (Vimpat) 100 mg tablet, TAKE 1 TABLET BY MOUTH TWO TIMES A DAY FOR 30 DAYS, Disp: 60 each, Rfl: 3    lamoTRIgine (LaMICtal) 100 mg tablet, Take 1 tablet (100 mg) by mouth once daily at bedtime., Disp: , Rfl:     loratadine (Claritin) 10 mg tablet, Take 1 tablet (10 mg) by mouth once daily., Disp: , Rfl:     macitentan (Opsumit) 10 mg tablet tablet, Take 1 tablet (10 mg) by mouth once daily., Disp: , Rfl:     methocarbamol (Robaxin) 750 mg tablet, TAKE 1 TABLET BY MOUTH EVERY 8 HOURS, Disp: 270 tablet, Rfl: 3    metoprolol tartrate (Lopressor) 25 mg tablet, Take 2 tablets (50 mg) by mouth 2 times a day., Disp: 360 tablet, Rfl: 3    multivitamin capsule, Take 1 capsule by mouth once daily., Disp: , Rfl:     omeprazole (PriLOSEC) 40 mg DR  capsule, Take 1 capsule every day by oral route., Disp: , Rfl:     rimegepant (NURTEC) 75 mg tablet,disintegrating, DISSOLVE 1 TABLET IN MOUTH ON THE TONGUE EVERY OTHER DAY, Disp: 96 tablet, Rfl: 0    rivaroxaban (Xarelto) 10 mg tablet, TAKE 1 TABLET BY MOUTH ONCE DAILY WITH FOOD, Disp: 90 tablet, Rfl: 3    selexipag (Uptravi) 200 mcg tablet, Take 3 tablets (600 mcg) by mouth 2 times a day. Do not crush, chew, or split., Disp: , Rfl:     sucralfate (Carafate) 1 gram tablet, TAKE 1 TABLET BY MOUTH EVERY 6 HOURS ON AN EMPTY STOMACH, Disp: 360 tablet, Rfl: 3    SUMAtriptan (Imitrex) 100 mg tablet, TAKE 1 TABLET BY MOUTH TWO TIMES A DAY AS NEEDED, Disp: 60 tablet, Rfl: 11     Allergies  Iodine and Iodine (bulk)    Review of Systems   Constitutional:  Positive for fatigue.   HENT: Negative.     Eyes: Negative.    Respiratory:  Positive for cough and shortness of breath. Negative for wheezing.    Cardiovascular:  Positive for palpitations and leg swelling.   Gastrointestinal:  Positive for diarrhea and nausea. Negative for vomiting.   Endocrine: Negative.    Genitourinary: Negative.    Musculoskeletal:  Positive for back pain.   Skin: Negative.    Allergic/Immunologic: Negative.    Neurological:  Positive for dizziness, light-headedness and headaches. Negative for syncope and weakness.   Hematological: Negative.    Psychiatric/Behavioral: Negative.         Last Recorded Vitals    Vitals:    05/06/24 1539   BP: 93/64   Pulse: 94   SpO2: 98%         Physical Exam  Nursing note reviewed: Walker.   HENT:      Head: Normocephalic.   Cardiovascular:      Rate and Rhythm: Normal rate and regular rhythm.      Pulses: Normal pulses.      Heart sounds: Normal heart sounds.   Pulmonary:      Effort: Pulmonary effort is normal.      Breath sounds: Normal breath sounds.   Abdominal:      General: Bowel sounds are normal.      Palpations: Abdomen is soft.   Musculoskeletal:      Right lower leg: Edema (Trace) present.      Left lower  "leg: Edema (Trace) present.   Skin:     Findings: No rash.   Neurological:      Mental Status: She is alert.   Psychiatric:         Mood and Affect: Mood normal.         Judgment: Judgment normal.            Relevant Results    6MWT )  SpO2: 98-95 6 Liters nc  HR:  Wilfredo:0-5  Actual Meters:178    6MWT (23)  SpO2: 97% - 95% on RA  HR: 100 - 126  Wilfredo: 1 - 5  Actual 189m    6MWT (2023)  SpO2: 100-97% 4LPM  HR:   Wilfredo-100  Actual meters: 146       V/Q scan (2023) The perfusion of both lungs is within normal limits with no evidence  for acute pulmonary embolism. Very low probability.     V/Q (3/27/23): no discrete ventilation abnormalities, no bronchopulmonary segment conforming perfusion defects; Dr. Brar notes \"segmental perfusion defect in right mid lung zones that appear mismatched\"        RHC (23):   PAp 60/23 (33)  PW 5  C.O. 4.52       PFTs (3/20/23): available for review    Echo (3/12/2024)  Right Ventricle: The right ventricle is normal in size. There is normal right ventricular global systolic function. A device is visualized in the right ventricle.  Right Atrium: The right atrium is severely dilated. There is a device visualized in the right atrium.       Echo (3/7/23): moderately enlarged RV with mildly reduced function, RA is upper limits of normal in size, moderate MR, no AR, moderate TR, RVSP 71 mmHg     Assessment/Plan     1) Pulmonary  a. Normal PFTs and DLCO  b. Essentially normal CT  c. Paradoxically reported low oxygen levels although SpO2 walking for me today = 97% on 4l/min, walked on room air 2023 with desat to 95% on RA.   d. Stated remote () unprovoked PE with recurrence immediately after ceasing anticoagulation. Interpretation of V/Q with ? RML defect (Peripherally) I do not see it. Original interpretation did not see it. It is not common for presentation this many years after PE. Similarly even if there was small peripheral R defect, it " does not explain profound report desat and exercise intolerance displayed. SEE BELOW UNDER CARDIOVASCULAR, NEW CATH CONSISTENT WITH PAH.  e. Mild ELENA, AHI =6 from study, lowest SpO2= 87% relatively briefly.      2) Cardiovascular  a. Paroxysmal resting SVT. (+) ZIO patch referred to EP.   b. Original Resting PA mean of 32 reported which was confusing given,  progression to 47 mm HG consistent with IPAH, FC III. Given adempas, needs additional therapy. Exercise cath 47-> 57, no change in PCWP.  c. ? TIA remote.     3) Neuropsych  a. ON multiple med  b. migranes.     May have fatigue elements of long COVID     Plan  1) No increase for now  2) F/U in 8 weeks with echo, 6 MW>   3) Call every 2 weeks to assess edema and side effects.

## 2024-05-01 ENCOUNTER — PHARMACY VISIT (OUTPATIENT)
Dept: PHARMACY | Facility: CLINIC | Age: 49
End: 2024-05-01
Payer: COMMERCIAL

## 2024-05-02 DIAGNOSIS — I27.20 PULMONARY HYPERTENSION (MULTI): ICD-10-CM

## 2024-05-02 RX ORDER — RIOCIGUAT 2.5 MG/1
2.5 TABLET, FILM COATED ORAL 3 TIMES DAILY
Qty: 90 TABLET | Refills: 11 | Status: SHIPPED | OUTPATIENT
Start: 2024-05-02 | End: 2025-05-02

## 2024-05-06 ENCOUNTER — HOSPITAL ENCOUNTER (OUTPATIENT)
Dept: RESPIRATORY THERAPY | Facility: HOSPITAL | Age: 49
Discharge: HOME | End: 2024-05-06
Payer: COMMERCIAL

## 2024-05-06 ENCOUNTER — OFFICE VISIT (OUTPATIENT)
Dept: PULMONOLOGY | Facility: HOSPITAL | Age: 49
End: 2024-05-06
Payer: COMMERCIAL

## 2024-05-06 VITALS
OXYGEN SATURATION: 98 % | WEIGHT: 242.4 LBS | SYSTOLIC BLOOD PRESSURE: 93 MMHG | HEART RATE: 94 BPM | DIASTOLIC BLOOD PRESSURE: 64 MMHG | HEIGHT: 64 IN | BODY MASS INDEX: 41.38 KG/M2

## 2024-05-06 DIAGNOSIS — I27.20 PULMONARY HYPERTENSION (MULTI): ICD-10-CM

## 2024-05-06 DIAGNOSIS — R06.02 SOB (SHORTNESS OF BREATH): ICD-10-CM

## 2024-05-06 PROCEDURE — 3074F SYST BP LT 130 MM HG: CPT | Performed by: INTERNAL MEDICINE

## 2024-05-06 PROCEDURE — 3078F DIAST BP <80 MM HG: CPT | Performed by: INTERNAL MEDICINE

## 2024-05-06 PROCEDURE — 99215 OFFICE O/P EST HI 40 MIN: CPT | Performed by: INTERNAL MEDICINE

## 2024-05-06 PROCEDURE — 94618 PULMONARY STRESS TESTING: CPT

## 2024-05-06 PROCEDURE — 1036F TOBACCO NON-USER: CPT | Performed by: INTERNAL MEDICINE

## 2024-05-06 PROCEDURE — 94618 PULMONARY STRESS TESTING: CPT | Performed by: STUDENT IN AN ORGANIZED HEALTH CARE EDUCATION/TRAINING PROGRAM

## 2024-05-06 ASSESSMENT — ENCOUNTER SYMPTOMS
DIARRHEA: 1
WEAKNESS: 0
HEMATOLOGIC/LYMPHATIC NEGATIVE: 1
WHEEZING: 0
BACK PAIN: 1
ENDOCRINE NEGATIVE: 1
LIGHT-HEADEDNESS: 1
COUGH: 1
PALPITATIONS: 1
PSYCHIATRIC NEGATIVE: 1
DIZZINESS: 1
ALLERGIC/IMMUNOLOGIC NEGATIVE: 1
FATIGUE: 1
SHORTNESS OF BREATH: 1
NAUSEA: 1
HEADACHES: 1
VOMITING: 0
EYES NEGATIVE: 1

## 2024-05-06 ASSESSMENT — PAIN SCALES - GENERAL: PAINLEVEL: 0-NO PAIN

## 2024-05-07 DIAGNOSIS — R06.02 SOB (SHORTNESS OF BREATH) ON EXERTION: Primary | ICD-10-CM

## 2024-05-07 DIAGNOSIS — R60.9 EDEMA, UNSPECIFIED TYPE: ICD-10-CM

## 2024-05-07 DIAGNOSIS — I27.20 PULMONARY HYPERTENSION (MULTI): ICD-10-CM

## 2024-05-07 RX ORDER — FUROSEMIDE 40 MG/1
TABLET ORAL
Qty: 60 TABLET | Refills: 11 | Status: SHIPPED | OUTPATIENT
Start: 2024-05-07

## 2024-05-14 ENCOUNTER — TELEPHONE (OUTPATIENT)
Dept: PULMONOLOGY | Facility: HOSPITAL | Age: 49
End: 2024-05-14
Payer: COMMERCIAL

## 2024-05-14 NOTE — TELEPHONE ENCOUNTER
I called the pt to check on her edema after increasing her lasix to 40 mg 2 tablets daily. She states the edema has gone down over the weekend to where she can walk without pain to her feet. BP today 117/76. Wants to continue lasix a few more days and call us on Thursday. In agreement with plan as that was the orders from Dr. Johnson at her visit.

## 2024-05-16 ENCOUNTER — APPOINTMENT (OUTPATIENT)
Dept: PULMONOLOGY | Facility: HOSPITAL | Age: 49
End: 2024-05-16
Payer: COMMERCIAL

## 2024-05-23 ENCOUNTER — DOCUMENTATION (OUTPATIENT)
Dept: PULMONOLOGY | Facility: HOSPITAL | Age: 49
End: 2024-05-23
Payer: COMMERCIAL

## 2024-05-23 NOTE — PROGRESS NOTES
Patients  called requesting information to be sent to [facility] for coupon for [employer]. Nursing provided office fax number for communications to be sent for further info.

## 2024-06-03 PROCEDURE — RXMED WILLOW AMBULATORY MEDICATION CHARGE

## 2024-06-04 ENCOUNTER — DOCUMENTATION (OUTPATIENT)
Dept: PULMONOLOGY | Facility: HOSPITAL | Age: 49
End: 2024-06-04
Payer: COMMERCIAL

## 2024-06-04 ENCOUNTER — PHARMACY VISIT (OUTPATIENT)
Dept: PHARMACY | Facility: CLINIC | Age: 49
End: 2024-06-04
Payer: COMMERCIAL

## 2024-06-04 NOTE — PROGRESS NOTES
Patient called in to report symptoms of edema. Said that she has been taking 80mg of lasix on and off since last visit. Further states that when attempted to decrease to 40mg swelling resumes. Also c/o muscle cramping to which she has been supplementing her diet with bananas and Gatorade. Not currently on K supplements. Additionally requesting a DME order to be sent to [facility] for electric scooter due to deconditioning. Will discuss with Dr. Johnson

## 2024-06-05 ENCOUNTER — DOCUMENTATION (OUTPATIENT)
Dept: PULMONOLOGY | Facility: HOSPITAL | Age: 49
End: 2024-06-05
Payer: COMMERCIAL

## 2024-06-05 DIAGNOSIS — I27.20 PULMONARY HYPERTENSION (MULTI): ICD-10-CM

## 2024-06-05 DIAGNOSIS — R06.02 SOB (SHORTNESS OF BREATH): ICD-10-CM

## 2024-06-21 PROCEDURE — RXMED WILLOW AMBULATORY MEDICATION CHARGE

## 2024-06-22 ENCOUNTER — PHARMACY VISIT (OUTPATIENT)
Dept: PHARMACY | Facility: CLINIC | Age: 49
End: 2024-06-22
Payer: COMMERCIAL

## 2024-07-01 PROCEDURE — RXMED WILLOW AMBULATORY MEDICATION CHARGE

## 2024-07-03 ENCOUNTER — DOCUMENTATION (OUTPATIENT)
Dept: PULMONOLOGY | Facility: HOSPITAL | Age: 49
End: 2024-07-03
Payer: COMMERCIAL

## 2024-07-03 DIAGNOSIS — I27.20 PULMONARY HYPERTENSION (MULTI): ICD-10-CM

## 2024-07-03 DIAGNOSIS — R06.02 SOB (SHORTNESS OF BREATH): ICD-10-CM

## 2024-07-03 DIAGNOSIS — R60.9 EDEMA, UNSPECIFIED TYPE: ICD-10-CM

## 2024-07-03 RX ORDER — FUROSEMIDE 40 MG/1
80 TABLET ORAL DAILY
Qty: 60 TABLET | Refills: 11 | Status: SHIPPED | OUTPATIENT
Start: 2024-07-03 | End: 2025-07-03

## 2024-07-03 NOTE — PROGRESS NOTES
Patients  called to report patient needs refill of 80 mg of lasix. Reporting has not taken lasix in 4 days. Edema remains to feet and ankles, unable to wear shoes. Instructed patient to obtain labs ordered and office to order lasix to local pharmacy.

## 2024-07-05 ENCOUNTER — LAB (OUTPATIENT)
Dept: LAB | Facility: LAB | Age: 49
End: 2024-07-05
Payer: COMMERCIAL

## 2024-07-05 DIAGNOSIS — R06.02 SOB (SHORTNESS OF BREATH): ICD-10-CM

## 2024-07-05 DIAGNOSIS — I27.20 PULMONARY HYPERTENSION (MULTI): ICD-10-CM

## 2024-07-05 LAB
ANION GAP SERPL CALC-SCNC: 13 MMOL/L (ref 10–20)
BASOPHILS # BLD AUTO: 0.03 X10*3/UL (ref 0–0.1)
BASOPHILS NFR BLD AUTO: 0.9 %
BUN SERPL-MCNC: 16 MG/DL (ref 6–23)
CALCIUM SERPL-MCNC: 8.1 MG/DL (ref 8.6–10.3)
CHLORIDE SERPL-SCNC: 103 MMOL/L (ref 98–107)
CO2 SERPL-SCNC: 28 MMOL/L (ref 21–32)
COTININE UR QL SCN: NEGATIVE
CREAT SERPL-MCNC: 0.68 MG/DL (ref 0.5–1.05)
EGFRCR SERPLBLD CKD-EPI 2021: >90 ML/MIN/1.73M*2
EOSINOPHIL # BLD AUTO: 0.07 X10*3/UL (ref 0–0.7)
EOSINOPHIL NFR BLD AUTO: 2.2 %
ERYTHROCYTE [DISTWIDTH] IN BLOOD BY AUTOMATED COUNT: 14.2 % (ref 11.5–14.5)
GLUCOSE SERPL-MCNC: 85 MG/DL (ref 74–99)
HCT VFR BLD AUTO: 36.6 % (ref 36–46)
HGB BLD-MCNC: 11.2 G/DL (ref 12–16)
IMM GRANULOCYTES # BLD AUTO: 0.01 X10*3/UL (ref 0–0.7)
IMM GRANULOCYTES NFR BLD AUTO: 0.3 % (ref 0–0.9)
LYMPHOCYTES # BLD AUTO: 1.35 X10*3/UL (ref 1.2–4.8)
LYMPHOCYTES NFR BLD AUTO: 41.9 %
MAGNESIUM SERPL-MCNC: 1.79 MG/DL (ref 1.6–2.4)
MCH RBC QN AUTO: 25.5 PG (ref 26–34)
MCHC RBC AUTO-ENTMCNC: 30.6 G/DL (ref 32–36)
MCV RBC AUTO: 83 FL (ref 80–100)
MONOCYTES # BLD AUTO: 0.28 X10*3/UL (ref 0.1–1)
MONOCYTES NFR BLD AUTO: 8.7 %
NEUTROPHILS # BLD AUTO: 1.48 X10*3/UL (ref 1.2–7.7)
NEUTROPHILS NFR BLD AUTO: 46 %
NRBC BLD-RTO: 0 /100 WBCS (ref 0–0)
PLATELET # BLD AUTO: 285 X10*3/UL (ref 150–450)
POTASSIUM SERPL-SCNC: 4.5 MMOL/L (ref 3.5–5.3)
RBC # BLD AUTO: 4.4 X10*6/UL (ref 4–5.2)
SODIUM SERPL-SCNC: 139 MMOL/L (ref 136–145)
WBC # BLD AUTO: 3.2 X10*3/UL (ref 4.4–11.3)

## 2024-07-05 PROCEDURE — 85025 COMPLETE CBC W/AUTO DIFF WBC: CPT

## 2024-07-05 PROCEDURE — 36415 COLL VENOUS BLD VENIPUNCTURE: CPT

## 2024-07-05 PROCEDURE — 80048 BASIC METABOLIC PNL TOTAL CA: CPT

## 2024-07-05 PROCEDURE — 83735 ASSAY OF MAGNESIUM: CPT

## 2024-07-11 ENCOUNTER — APPOINTMENT (OUTPATIENT)
Dept: RESPIRATORY THERAPY | Facility: HOSPITAL | Age: 49
End: 2024-07-11
Payer: COMMERCIAL

## 2024-07-11 ENCOUNTER — APPOINTMENT (OUTPATIENT)
Dept: CARDIOLOGY | Facility: HOSPITAL | Age: 49
End: 2024-07-11
Payer: COMMERCIAL

## 2024-07-11 ENCOUNTER — APPOINTMENT (OUTPATIENT)
Dept: PULMONOLOGY | Facility: HOSPITAL | Age: 49
End: 2024-07-11
Payer: COMMERCIAL

## 2024-07-16 ENCOUNTER — TELEPHONE (OUTPATIENT)
Dept: PULMONOLOGY | Facility: HOSPITAL | Age: 49
End: 2024-07-16
Payer: COMMERCIAL

## 2024-07-16 NOTE — TELEPHONE ENCOUNTER
Pt called in to the office for c/o nausea, vomiting, diarrhea, edema, and shortness of breath. She is taking Uptravi 600 mcgs twice daily. Dr. Johnson notified and orders received to decrease dose to 400 mcgs twice daily. Pt agrees with plan of care. Pharmacy notified and will send lower dose of medication. Pt will be given an appointment for an Echo and follow up visit. Pt to call Friday with an update on condition.

## 2024-07-17 ENCOUNTER — DOCUMENTATION (OUTPATIENT)
Dept: PULMONOLOGY | Facility: HOSPITAL | Age: 49
End: 2024-07-17
Payer: COMMERCIAL

## 2024-07-17 ENCOUNTER — TELEPHONE (OUTPATIENT)
Dept: PULMONOLOGY | Facility: HOSPITAL | Age: 49
End: 2024-07-17
Payer: COMMERCIAL

## 2024-07-17 NOTE — TELEPHONE ENCOUNTER
Patient called to follow up on the new 200mg tabs for Uptravi. Advised pt [facility] to reach out with shipping confirmation. Pt also upset about scheduling leaving message about appointment that she is unable to attend due to transportation issues. Pt agreed to get echo next week and we will request a Dr. Johnson appointment for mid to late August.

## 2024-07-17 NOTE — PROGRESS NOTES
History Of Present Illness  Jazmin Hein is a 49 y.o. female presenting with pulmonary arterial hypertension last seen in clinic 5/6/2024 . Patient is NYHA Functional Class III and WHO Group 1.      Ms. Hein was admitted to the [facility] on 3/08/24 for N/V/D, HA, myalgias in s/o increasing selexipag dosage for pulmonary HTN. She endorsed the same symptoms for each uptitration, but the symptoms dissipate within 2-3 days. Currently endorsing the symptoms for 2-3 weeks w/ difficult tolerating PO and 10-15 lb weight loss. In the ED, patient was initiated on IV tylenol and zofran and rehydrated for soft BP. Selexipag (Uptravi) dosage was decreased to 1400 from 1600 and symptoms were monitored. Throughout stay, symptoms slowly dissipated but SOB persisted. Restarted diuresis day to day w/ 40 mg IV lasix pushes while monitoring RFP and UO. Additionally, patient required decrease Selexipag dosing to 600 micrograms BID w/ improvement of symptoms. Gastroenterology was consulted as selexipag dosing was being down titrated and assessed pt to be having side effects from selexipag and rec'd continued use of zofran with caution. Qtc was frequently monitored on EKGs with a peak Qtc of 502 on 3/19/24 (at which time pt was receiving zofran 3 times a day  minutes before each meal) and decreased back down to 491 on discharge day when the day before she was encouraged to only take zofran if she got nauseous (and only received Zofran day previous 3/21 for breakfast). Patient able to ambulate at baseline functional status and tolerate PO w/o vomiting on 3/22/24 and ok for discharge w/ follow up w/ Dr. Johnson' clinic. Restarted on home medications including home diuretic 40 mg PO Lasix. TTE was performed without any acute changes and stable moderate MR, consistent elevated RV pressure and RA dilation.    PAH Treatment: Adempas 2.5 mg TID, macitentan 10 mg, selexipag 600 mcg  Infusion site: N/A  Treatment history: N/A    Testing  Today: Echo    Interval History           Past Medical History  Patient Active Problem List   Diagnosis    Dyspnea on exertion    SOB (shortness of breath) on exertion    Vitamin D deficiency    Ureterolithiasis    Transaminitis    TIA (transient ischemic attack)    Tachycardia    Weakness    Sleep apnea    Chronic pulmonary embolism (Multi)    Pulmonary hypertension (Multi)    Post-operative pain    Paresthesia of skin    Dependence on supplemental oxygen    Obstructive sleep apnea of adult    Obesity    Nausea and vomiting    Migraine headache    Hypoglycemia after GI (gastrointestinal) surgery (HHS-HCC)    Lower back pain    Lipids abnormal    Leukopenia    Left arm weakness    Intermittent urinary incontinence    Insomnia    Hypoglycemia    Hypertension    Flushing    HLD (hyperlipidemia)    History of ovarian cancer    Heartburn    Arthritis    Gastric ulcer    Fibromyalgia    Electrolyte and fluid disorder    Drug-induced liver injury    Dizziness    Disease due to severe acute respiratory syndrome coronavirus 2 (SARS-CoV-2)    Daytime sleepiness    Chronic gouty arthritis    Chronic gout of foot due to renal impairment    Cholecystitis, chronic    Bipolar disorder (Multi)    Bilateral swelling of feet    Bariatric surgery status    Arm paresthesia, left    Allergies    Acute unilateral obstructive uropathy    Abnormal findings on cardiac catheterization    Abnormal echocardiogram    Abdominal right upper quadrant tenderness    Abdominal pain, epigastric    Abdominal pain of multiple sites    Pacemaker    Chronic respiratory failure with hypoxia (Multi)    Paroxysmal supraventricular tachycardia (CMS-HCC)    PAC (premature atrial contraction)    PVC (premature ventricular contraction)    NSVT (nonsustained ventricular tachycardia) (Multi)    Palpitations    Chest pain    Allergic reaction to drug, initial encounter        Surgical History  She has a past surgical history that includes Hysterectomy  (2016); Other surgical history (2016);  section, classic (2016); Gastric bypass (2017); and Cardiac catheterization (N/A, 10/31/2023).     Social History  She reports that she has never smoked. She has never used smokeless tobacco. She reports that she does not currently use alcohol. She reports current drug use. Drug: Marijuana.    Family History  No family history on file.     Medications      Current Outpatient Medications:     Adempas 2.5 mg tablet, Take 1 tablet (2.5 mg) by mouth 3 times a day., Disp: 90 tablet, Rfl: 11    albuterol 90 mcg/actuation inhaler, INHALE 1 PUFF EVERY 6 HOURS AS NEEDED, Disp: 25.5 g, Rfl: 3    allopurinol (Zyloprim) 100 mg tablet, TAKE 1 TABLET BY MOUTH DAILY, Disp: 90 tablet, Rfl: 3    amitriptyline (Elavil) 50 mg tablet, TAKE 1 TABLET BY MOUTH AT BEDTIME, Disp: 90 tablet, Rfl: 3    ascorbic acid (Vitamin C) 500 mg tablet, Take 1 tablet (500 mg) by mouth once daily., Disp: , Rfl:     blood sugar diagnostic strip, as directed, Disp: , Rfl:     butalbitaL-acetaminophen  mg capsule, Take 1 capsule by mouth every 4 hours if needed (migraine)., Disp: , Rfl:     cariprazine (Vraylar) 1.5 mg capsule, Take 1 capsule (1.5 mg) by mouth once daily., Disp: , Rfl:     ergocalciferol (Vitamin D-2) 1.25 MG (65842 UT) capsule, TAKE 1 CAPSULE BY MOUTH ONCE WEEKLY, Disp: 12 capsule, Rfl: 3    FLUoxetine (PROzac) 20 mg tablet, TAKE 1 TABLET BY MOUTH ONCE DAILY, Disp: 90 tablet, Rfl: 3    furosemide (Lasix) 40 mg tablet, Take 1 tablet daily or as directed. Systolic blood pressure must be greater than 95 to double up on the dose., Disp: 60 tablet, Rfl: 11    furosemide (Lasix) 40 mg tablet, Take 2 tablets (80 mg) by mouth once daily., Disp: 60 tablet, Rfl: 11    gabapentin (Neurontin) 300 mg capsule, TAKE 1 CAPSULE BY MOUTH ONCE DAILY, Disp: 90 capsule, Rfl: 3    gabapentin (Neurontin) 600 mg tablet, TAKE 1 TABLET BY MOUTH ONCE DAILY, Disp: 90 tablet, Rfl: 3     galcanezumab (Emgality) 120 mg/mL prefilled syringe, INJECT 1 ML UNDER THE SKIN ONCE MONTHLY, Disp: 3 mL, Rfl: 3    ipratropium-albuteroL (Duo-Neb) 0.5-2.5 mg/3 mL nebulizer solution, Take 3 mL by nebulization every 4 hours if needed for wheezing., Disp: 540 mL, Rfl: 0    lacosamide (Vimpat) 100 mg tablet, TAKE 1 TABLET BY MOUTH TWO TIMES A DAY FOR 30 DAYS, Disp: 60 each, Rfl: 3    lamoTRIgine (LaMICtal) 100 mg tablet, Take 1 tablet (100 mg) by mouth once daily at bedtime., Disp: , Rfl:     loratadine (Claritin) 10 mg tablet, Take 1 tablet (10 mg) by mouth once daily., Disp: , Rfl:     macitentan (Opsumit) 10 mg tablet tablet, Take 1 tablet (10 mg) by mouth once daily., Disp: , Rfl:     methocarbamol (Robaxin) 750 mg tablet, TAKE 1 TABLET BY MOUTH EVERY 8 HOURS, Disp: 270 tablet, Rfl: 3    metoprolol tartrate (Lopressor) 25 mg tablet, Take 2 tablets (50 mg) by mouth 2 times a day., Disp: 360 tablet, Rfl: 3    multivitamin capsule, Take 1 capsule by mouth once daily., Disp: , Rfl:     omeprazole (PriLOSEC) 40 mg DR capsule, Take 1 capsule every day by oral route., Disp: , Rfl:     rivaroxaban (Xarelto) 10 mg tablet, TAKE 1 TABLET BY MOUTH ONCE DAILY WITH FOOD, Disp: 90 tablet, Rfl: 3    selexipag (Uptravi) 200 mcg tablet, Take 3 tablets (600 mcg) by mouth 2 times a day. Do not crush, chew, or split., Disp: , Rfl:     sucralfate (Carafate) 1 gram tablet, TAKE 1 TABLET BY MOUTH EVERY 6 HOURS ON AN EMPTY STOMACH, Disp: 360 tablet, Rfl: 3    SUMAtriptan (Imitrex) 100 mg tablet, TAKE 1 TABLET BY MOUTH TWO TIMES A DAY AS NEEDED, Disp: 60 tablet, Rfl: 11     Allergies  Iodine and Iodine (bulk)    Review of Systems    Last Recorded Vitals  There were no vitals taken for this visit.     Physical Exam       Relevant Results    Echo 24 pending        6MWT  (2024)  SpO2: 98-95%  HR:   Wilfredo:0-5  Actual Meters: 178 meters    RHC(10/31/23)  PAP- 62/20 38  PCWP-12  CO/CI- 5.8/3.1     6MWT (23)  SpO2: 97% - 95% on  "RA  HR: 100 - 126  Wilfredo: 1 - 5  Actual 189m     6MWT (2023)  SpO2: 100-97% 4LPM  HR:   Wilfredo-100  Actual meters: 146       V/Q scan (2023) The perfusion of both lungs is within normal limits with no evidence  for acute pulmonary embolism. Very low probability.     V/Q (3/27/23): no discrete ventilation abnormalities, no bronchopulmonary segment conforming perfusion defects; Dr. Brar notes \"segmental perfusion defect in right mid lung zones that appear mismatched\"         RHC (23):   PAp 60/ (33)  PW 5  C.O. 4.52     PFTs (3/20/23): available for review     Echo (3/12/2024)  Right Ventricle: The right ventricle is normal in size. There is normal right ventricular global systolic function. A device is visualized in the right ventricle.  Right Atrium: The right atrium is severely dilated. There is a device visualized in the right atrium.   ***     Assessment/Plan     1) Pulmonary  a. Normal PFTs and DLCO  b. Essentially normal CT  c. Paradoxically reported low oxygen levels although SpO2 walking for me today = 97% on 4l/min, walked on room air 2023 with desat to 95% on RA.   d. Stated remote () unprovoked PE with recurrence immediately after ceasing anticoagulation. Interpretation of V/Q with ? RML defect (Peripherally) I do not see it. Original interpretation did not see it. It is not common for presentation this many years after PE. Similarly even if there was small peripheral R defect, it does not explain profound report desat and exercise intolerance displayed. SEE BELOW UNDER CARDIOVASCULAR, NEW CATH CONSISTENT WITH PAH.  e. Mild ELENA, AHI =6 from study, lowest SpO2= 87% relatively briefly.      2) Cardiovascular  a. Paroxysmal resting SVT. (+) ZIO patch referred to EP.   b. Original Resting PA mean of 32 reported which was confusing given,  progression to 47 mm HG consistent with IPAH, FC III. Given adempas, needs additional therapy. Exercise cath 47-> 57, no change in " PCWP.  c. ? TIA remote.    Plan    1) Event monitor (+) for tachycardia, referred to EP, patient not called, EPIC instituted, re-referred.   2) Add macitentan and selexipag sequentially. Macitentan first, then selexipag in about 4 weeks, follow up 6-8 weeks with 6 MW and echo.

## 2024-07-18 ENCOUNTER — PHARMACY VISIT (OUTPATIENT)
Dept: PHARMACY | Facility: CLINIC | Age: 49
End: 2024-07-18
Payer: COMMERCIAL

## 2024-07-23 ENCOUNTER — HOSPITAL ENCOUNTER (OUTPATIENT)
Dept: CARDIOLOGY | Facility: CLINIC | Age: 49
Discharge: HOME | End: 2024-07-23
Payer: COMMERCIAL

## 2024-07-23 DIAGNOSIS — I27.20 PULMONARY HYPERTENSION (MULTI): ICD-10-CM

## 2024-07-23 DIAGNOSIS — R06.02 SOB (SHORTNESS OF BREATH) ON EXERTION: ICD-10-CM

## 2024-07-23 DIAGNOSIS — R06.00 DYSPNEA, UNSPECIFIED: ICD-10-CM

## 2024-07-23 LAB
AORTIC VALVE PEAK VELOCITY: 1.61 M/S
AV PEAK GRADIENT: 10.4 MMHG
AVA (PEAK VEL): 1.98 CM2
EJECTION FRACTION APICAL 4 CHAMBER: 67.3
EJECTION FRACTION: 60 %
LEFT ATRIUM VOLUME AREA LENGTH INDEX BSA: 26.2 ML/M2
LEFT VENTRICLE INTERNAL DIMENSION DIASTOLE: 4.67 CM (ref 3.5–6)
LEFT VENTRICULAR OUTFLOW TRACT DIAMETER: 1.93 CM
MITRAL VALVE E/A RATIO: 0.82
MITRAL VALVE E/E' RATIO: 9.17
RIGHT VENTRICLE FREE WALL PEAK S': 10 CM/S
RIGHT VENTRICLE PEAK SYSTOLIC PRESSURE: 37.4 MMHG
TRICUSPID ANNULAR PLANE SYSTOLIC EXCURSION: 2.5 CM

## 2024-07-23 PROCEDURE — 93306 TTE W/DOPPLER COMPLETE: CPT | Performed by: INTERNAL MEDICINE

## 2024-07-23 PROCEDURE — 93306 TTE W/DOPPLER COMPLETE: CPT

## 2024-07-23 NOTE — PROGRESS NOTES
Patient called on 7/17/2024 to reschedule her appointment because she was unable to secure a ride. Pt agreeable to get her echo done next week, 7/23/2024, as scheduled and we will reach out to reschedule her clinic appointment mid to late August.

## 2024-08-01 ENCOUNTER — APPOINTMENT (OUTPATIENT)
Dept: PULMONOLOGY | Facility: HOSPITAL | Age: 49
End: 2024-08-01
Payer: COMMERCIAL

## 2024-08-05 PROCEDURE — RXMED WILLOW AMBULATORY MEDICATION CHARGE

## 2024-08-06 NOTE — PROGRESS NOTES
History Of Present Illness  Jamzin Hein is a 49 y.o. female presenting with pulmonary arterial hypertension last seen in clinic 5/6/2024. Patient is NYHA Functional Class III and WHO Group 1.      Ms. Hein was admitted to the [facility] on 3/08/24 for N/V/D, HA, myalgias in s/o increasing selexipag dosage for pulmonary HTN. She endorsed the same symptoms for each uptitration, but the symptoms dissipate within 2-3 days. Currently endorsing the symptoms for 2-3 weeks w/ difficult tolerating PO and 10-15 lb weight loss. In the ED, patient was initiated on IV tylenol and zofran and rehydrated for soft BP. Selexipag (Uptravi) dosage was decreased to 1400 from 1600 and symptoms were monitored. Throughout stay, symptoms slowly dissipated but SOB persisted. Restarted diuresis day to day w/ 40 mg IV lasix pushes while monitoring RFP and UO. Additionally, patient required decrease Selexipag dosing to 600 micrograms BID w/ improvement of symptoms. Gastroenterology was consulted as selexipag dosing was being down titrated and assessed pt to be having side effects from selexipag and rec'd continued use of zofran with caution. Qtc was frequently monitored on EKGs with a peak Qtc of 502 on 3/19/24 (at which time pt was receiving zofran 3 times a day  minutes before each meal) and decreased back down to 491 on discharge day when the day before she was encouraged to only take zofran if she got nauseous (and only received Zofran day previous 3/21 for breakfast). Patient able to ambulate at baseline functional status and tolerate PO w/o vomiting on 3/22/24 and ok for discharge w/ follow up w/ Dr. Johnson' clinic. Restarted on home medications including home diuretic 40 mg PO Lasix. TTE was performed without any acute changes and stable moderate MR, consistent elevated RV pressure and RA dilation.    PAH Treatment:   Adempas 2.5 mg TID,   macitentan 10 mg (12/15/2023),   selexipag 600 -> 400 mcg (12/26/2023)  6L NC at all  times  Infusion site: N/A  Treatment history: N/A     Testing Today: 6 MWT        Interval History   (+) 10-15#  Mostly complaining of fatigue. Taking lasix 80 mg daily has +1/2 edema in legs and ankles. Daily nausea, vomiting and diarrhea. Using 6L NC at all times, reports SOB better at rest but worsening with activity. Reports 2 episodes of dizziness that made her fall to the floor. No LOC.          Past Medical History  Patient Active Problem List   Diagnosis    Dyspnea on exertion    SOB (shortness of breath) on exertion    Vitamin D deficiency    Ureterolithiasis    Transaminitis    TIA (transient ischemic attack)    Tachycardia    Weakness    Sleep apnea    Chronic pulmonary embolism (Multi)    Pulmonary hypertension (Multi)    Post-operative pain    Paresthesia of skin    Dependence on supplemental oxygen    Obstructive sleep apnea of adult    Obesity    Nausea and vomiting    Migraine headache    Hypoglycemia after GI (gastrointestinal) surgery (HHS-HCC)    Lower back pain    Lipids abnormal    Leukopenia    Left arm weakness    Intermittent urinary incontinence    Insomnia    Hypoglycemia    Hypertension    Flushing    HLD (hyperlipidemia)    History of ovarian cancer    Heartburn    Arthritis    Gastric ulcer    Fibromyalgia    Electrolyte and fluid disorder    Drug-induced liver injury    Dizziness    Disease due to severe acute respiratory syndrome coronavirus 2 (SARS-CoV-2)    Daytime sleepiness    Chronic gouty arthritis    Chronic gout of foot due to renal impairment    Cholecystitis, chronic    Bipolar disorder (Multi)    Bilateral swelling of feet    Bariatric surgery status    Arm paresthesia, left    Allergies    Acute unilateral obstructive uropathy    Abnormal findings on cardiac catheterization    Abnormal echocardiogram    Abdominal right upper quadrant tenderness    Abdominal pain, epigastric    Abdominal pain of multiple sites    Pacemaker    Chronic respiratory failure with hypoxia (Multi)     Paroxysmal supraventricular tachycardia (CMS-HCC)    PAC (premature atrial contraction)    PVC (premature ventricular contraction)    NSVT (nonsustained ventricular tachycardia) (Multi)    Palpitations    Chest pain    Allergic reaction to drug, initial encounter        Surgical History  She has a past surgical history that includes Hysterectomy (2016); Other surgical history (2016);  section, classic (2016); Gastric bypass (2017); and Cardiac catheterization (N/A, 10/31/2023).     Social History  She reports that she has never smoked. She has never used smokeless tobacco. She reports that she does not currently use alcohol. She reports current drug use. Drug: Marijuana.    Family History  No family history on file.     Medications      Current Outpatient Medications:     Adempas 2.5 mg tablet, Take 1 tablet (2.5 mg) by mouth 3 times a day., Disp: 90 tablet, Rfl: 11    albuterol 90 mcg/actuation inhaler, INHALE 1 PUFF EVERY 6 HOURS AS NEEDED, Disp: 25.5 g, Rfl: 3    allopurinol (Zyloprim) 100 mg tablet, TAKE 1 TABLET BY MOUTH DAILY, Disp: 90 tablet, Rfl: 3    amitriptyline (Elavil) 50 mg tablet, TAKE 1 TABLET BY MOUTH AT BEDTIME, Disp: 90 tablet, Rfl: 3    ascorbic acid (Vitamin C) 500 mg tablet, Take 1 tablet (500 mg) by mouth once daily., Disp: , Rfl:     blood sugar diagnostic strip, as directed, Disp: , Rfl:     cariprazine (Vraylar) 1.5 mg capsule, Take 1 capsule (1.5 mg) by mouth once daily., Disp: , Rfl:     ergocalciferol (Vitamin D-2) 1.25 MG (39348 UT) capsule, TAKE 1 CAPSULE BY MOUTH ONCE WEEKLY, Disp: 12 capsule, Rfl: 3    FLUoxetine (PROzac) 20 mg tablet, TAKE 1 TABLET BY MOUTH ONCE DAILY, Disp: 90 tablet, Rfl: 3    furosemide (Lasix) 40 mg tablet, Take 2 tablets (80 mg) by mouth once daily., Disp: 60 tablet, Rfl: 11    gabapentin (Neurontin) 300 mg capsule, TAKE 1 CAPSULE BY MOUTH ONCE DAILY, Disp: 90 capsule, Rfl: 3    gabapentin (Neurontin) 600 mg tablet, TAKE 1  TABLET BY MOUTH ONCE DAILY, Disp: 90 tablet, Rfl: 3    ipratropium-albuteroL (Duo-Neb) 0.5-2.5 mg/3 mL nebulizer solution, Take 3 mL by nebulization every 4 hours if needed for wheezing., Disp: 540 mL, Rfl: 0    lacosamide (Vimpat) 100 mg tablet, TAKE 1 TABLET BY MOUTH TWO TIMES A DAY FOR 30 DAYS, Disp: 60 each, Rfl: 3    lamoTRIgine (LaMICtal) 100 mg tablet, Take 1 tablet (100 mg) by mouth once daily at bedtime., Disp: , Rfl:     loratadine (Claritin) 10 mg tablet, Take 1 tablet (10 mg) by mouth once daily., Disp: , Rfl:     macitentan (Opsumit) 10 mg tablet tablet, Take 1 tablet (10 mg) by mouth once daily., Disp: , Rfl:     methocarbamol (Robaxin) 750 mg tablet, TAKE 1 TABLET BY MOUTH EVERY 8 HOURS, Disp: 270 tablet, Rfl: 3    metoprolol tartrate (Lopressor) 25 mg tablet, Take 2 tablets (50 mg) by mouth 2 times a day., Disp: 360 tablet, Rfl: 3    multivitamin capsule, Take 1 capsule by mouth once daily., Disp: , Rfl:     omeprazole (PriLOSEC) 40 mg DR capsule, Take 1 capsule every day by oral route., Disp: , Rfl:     rivaroxaban (Xarelto) 10 mg tablet, TAKE 1 TABLET BY MOUTH ONCE DAILY WITH FOOD, Disp: 90 tablet, Rfl: 3    SELEXIPAG ORAL, Take 400 mcg by mouth 2 times a day., Disp: , Rfl:     sucralfate (Carafate) 1 gram tablet, TAKE 1 TABLET BY MOUTH EVERY 6 HOURS ON AN EMPTY STOMACH, Disp: 360 tablet, Rfl: 3    SUMAtriptan (Imitrex) 100 mg tablet, TAKE 1 TABLET BY MOUTH TWO TIMES A DAY AS NEEDED, Disp: 60 tablet, Rfl: 11    butalbitaL-acetaminophen  mg capsule, Take 1 capsule by mouth every 4 hours if needed (migraine)., Disp: , Rfl:      Allergies  Iodine and Iodine (bulk)    Review of Systems   Constitutional:  Positive for fatigue.   HENT: Negative.     Eyes: Negative.    Respiratory:  Positive for shortness of breath. Negative for chest tightness.    Cardiovascular:  Positive for chest pain and leg swelling. Negative for palpitations.   Gastrointestinal:  Positive for diarrhea, nausea and vomiting.  "  Endocrine: Negative.    Genitourinary: Negative.    Musculoskeletal: Negative.    Skin: Negative.    Allergic/Immunologic: Negative.    Neurological:  Positive for dizziness, weakness and light-headedness. Negative for syncope.   Hematological: Negative.    Psychiatric/Behavioral: Negative.         Last Recorded Vitals  /72 (BP Location: Left arm, Patient Position: Sitting)   Pulse 74   Ht 1.638 m (5' 4.5\")   Wt 117 kg (257 lb 3.2 oz)   SpO2 97% Comment: 4 Liter O2  BMI 43.47 kg/m²        Physical Exam  Constitutional:       Appearance: She is obese.   HENT:      Head: Normocephalic.   Eyes:      Pupils: Pupils are equal, round, and reactive to light.   Cardiovascular:      Rate and Rhythm: Normal rate and regular rhythm.   Pulmonary:      Effort: Pulmonary effort is normal.      Breath sounds: Normal breath sounds.   Abdominal:      Palpations: Abdomen is soft.   Musculoskeletal:      Right lower leg: No edema.      Left lower leg: No edema.   Skin:     Findings: No rash.   Neurological:      General: No focal deficit present.      Mental Status: She is alert.   Psychiatric:         Mood and Affect: Mood normal.         Judgment: Judgment normal.            Relevant Results    6MWT (8/26/24) - 4 L/min contiuous.  SP02- 97->94%  HR-74-> 84  SEJAL- 1->3  Actual Meters 152 (Previously 178) pushing a wheelchair for orthopedic concerns.     Echo 7/23/24  Left Ventricle: The left ventricular systolic function is normal, with a visually estimated ejection fraction of 60%. There are no regional wall motion abnormalities. The left ventricular cavity size is normal. Spectral Doppler shows an impaired relaxation pattern of left ventricular diastolic filling.  Left Atrium: The left atrium is normal in size.  Right Ventricle: The right ventricle is mildly enlarged. There is normal right ventricular global systolic function. A device is visualized in the right ventricle.  Right Atrium: The right atrium is normal in " size.  Aortic Valve: The aortic valve is trileaflet. There is evidence of mildly elevated transaortic gradients consistent with sclerosis of the aortic valve. There is no evidence of aortic valve regurgitation. The peak instantaneous gradient of the aortic valve is 10.4 mmHg.  Mitral Valve: The mitral valve is mildly thickened. There is mild mitral valve regurgitation.  Tricuspid Valve: The tricuspid valve is structurally normal. There is mild tricuspid regurgitation.  Pulmonic Valve: The pulmonic valve is structurally normal. There is no indication of pulmonic valve regurgitation.  Pericardium: There is a trivial pericardial effusion.  Aorta: The aortic root is normal.  Pulmonary Artery: The tricuspid regurgitant velocity is 2.93 m/s, and with an estimated right atrial pressure of 3 mmHg, the estimated pulmonary artery pressure is mildly elevated with the RVSP at 37.4 mmHg.  In comparison to the previous echocardiogram(s): Compared with study dated 3/12/2024, The RVSP has decreased from 58 mm Hg.    6MWT  (2024)  SpO2: 98-95%  HR:   Wilfredo:0-5  Actual Meters: 178 meters    Echo (3/12/2024)  Left Ventricle: The left ventricular systolic function is normal, with an estimated ejection fraction of 60-65%. There are no regional wall motion abnormalities. The left ventricular cavity size is normal. The interventricular septum is flattened in systole, consistent with right ventricular pressure overload. Spectral Doppler shows a pseudonormal pattern of left ventricular diastolic filling. There is an elevated mean left atrial pressure.  Left Atrium: The left atrium is mild to moderately dilated.  Right Ventricle: The right ventricle is normal in size. There is normal right ventricular global systolic function. A device is visualized in the right ventricle.  Right Atrium: The right atrium is severely dilated. There is a device visualized in the right atrium.  Aortic Valve: The aortic valve is trileaflet. There is no  evidence of aortic valve regurgitation. The peak instantaneous gradient of the aortic valve is 14.2 mmHg. The mean gradient of the aortic valve is 7.2 mmHg.  Mitral Valve: The mitral valve is normal in structure. There is trace mitral valve regurgitation.  Tricuspid Valve: The tricuspid valve is structurally normal. There is mild tricuspid regurgitation. The Doppler estimated RVSP is moderate to severely elevated at 58.2 mmHg.  Pulmonic Valve: The pulmonic valve is structurally normal. There is physiologic pulmonic valve regurgitation.  Pericardium: There is no pericardial effusion noted.  Aorta: The aortic root is normal. The aortic root appears normal in size and measures 2.70 cm. The Asc Ao is 2.70 cm.  Systemic Veins: The inferior vena cava appears to be of normal size. There is IVC inspiratory collapse greater than 50%.  In comparison to the previous echocardiogram(s): Compared with study from 2023, the degree of TR is mildly increased. Otherwise findings are similar.  CONCLUSIONS:   1. Poorly visualized anatomical structures due to suboptimal image quality.   2. Spectral Doppler shows a pseudonormal pattern of left ventricular diastolic filling.   3. Left ventricular systolic function is normal with a 60-65% estimated ejection fraction.   4. Right ventricular pressure overload.   5. There is an elevated mean left atrial pressure.   6. The left atrium is mild to moderately dilated.   7. The right atrium is severely dilated.   8. Mild tricuspid regurgitation visualized.   9. Moderate to severely elevated right ventricular systolic pressure.  10. Normal aortic root.    RHC (10/31/23)  PAP- 62/20 38  PCWP-12  CO/CI- 5.8/3.1     6MWT (23)  SpO2: 97% - 95% on RA  HR: 100 - 126  Wilfredo: 1 - 5  Actual 189m     6MWT (2023)  SpO2: 100-97% 4LPM  HR:   Wilfredo-100  Actual meters: 146       V/Q scan (2023)   The perfusion of both lungs is within normal limits with no evidence for acute pulmonary  "embolism. Very low probability.     V/Q (3/27/23): no discrete ventilation abnormalities, no bronchopulmonary segment conforming perfusion defects; Dr. Brar notes \"segmental perfusion defect in right mid lung zones that appear mismatched\"         RHC (5/2/23)  PAp 60/23 (33)  PW 5  C.O. 4.52     PFTs (3/20/23): available for review       Assessment/Plan     1) Pulmonary  a. Normal PFTs and DLCO  b. Essentially normal CT  c. Paradoxically reported low oxygen levels although SpO2 walking for me today = 97% on 4l/min, walked on room air 9/14/2023 with desat to 95% on RA.   d. Stated remote (2014) unprovoked PE with recurrence immediately after ceasing anticoagulation. Interpretation of V/Q with ? RML defect (Peripherally) I do not see it. Original interpretation did not see it. It is not common for presentation this many years after PE. Similarly even if there was small peripheral R defect, it does not explain profound report desat and exercise intolerance displayed. SEE BELOW UNDER CARDIOVASCULAR, NEW CATH CONSISTENT WITH PAH.    At this point echo minimally abnormal, still with symptoms, intolerant of further increase selexipag.       e. Mild ELENA, AHI =6 from study, lowest SpO2= 87% relatively briefly.      2) Cardiovascular  a. Paroxysmal resting SVT. (+) ZIO patch referred to EP.   b. Original Resting PA mean of 32 reported which was confusing given,  progression to 47 mm HG consistent with IPAH, FC III. Given adempas, needs additional therapy. Exercise cath 47-> 57, no change in PCWP.  c. ? TIA remote.    3) Endocrine      A. BMI 34.47  B. Hyperlipidemia    Plan  1) Continue current Rx  2) Re-refer to sleep for daytime somnolence, patient nappping in room after 5 min.   3) Needs workup of nausea. Will refer to GI for nausea. No change with dramatic holding of drugs. Would not be expected from current PAH meds.   4) Follow up 3 months with 6 MW.              "

## 2024-08-07 ENCOUNTER — PHARMACY VISIT (OUTPATIENT)
Dept: PHARMACY | Facility: CLINIC | Age: 49
End: 2024-08-07
Payer: COMMERCIAL

## 2024-08-14 ENCOUNTER — APPOINTMENT (OUTPATIENT)
Dept: CARDIOLOGY | Facility: CLINIC | Age: 49
End: 2024-08-14
Payer: COMMERCIAL

## 2024-08-26 ENCOUNTER — HOSPITAL ENCOUNTER (OUTPATIENT)
Dept: RESPIRATORY THERAPY | Facility: HOSPITAL | Age: 49
Discharge: HOME | End: 2024-08-26
Payer: COMMERCIAL

## 2024-08-26 ENCOUNTER — OFFICE VISIT (OUTPATIENT)
Dept: PULMONOLOGY | Facility: HOSPITAL | Age: 49
End: 2024-08-26
Payer: COMMERCIAL

## 2024-08-26 VITALS
HEIGHT: 65 IN | DIASTOLIC BLOOD PRESSURE: 72 MMHG | OXYGEN SATURATION: 97 % | BODY MASS INDEX: 42.85 KG/M2 | SYSTOLIC BLOOD PRESSURE: 112 MMHG | WEIGHT: 257.2 LBS | HEART RATE: 74 BPM

## 2024-08-26 DIAGNOSIS — R06.02 SOB (SHORTNESS OF BREATH): ICD-10-CM

## 2024-08-26 DIAGNOSIS — I27.20 PULMONARY HYPERTENSION (MULTI): ICD-10-CM

## 2024-08-26 DIAGNOSIS — R11.0 NAUSEA: ICD-10-CM

## 2024-08-26 DIAGNOSIS — Z79.899 LONG-TERM USE OF HIGH-RISK MEDICATION: ICD-10-CM

## 2024-08-26 DIAGNOSIS — I27.20 PULMONARY HYPERTENSION (MULTI): Primary | ICD-10-CM

## 2024-08-26 DIAGNOSIS — R40.0 DAYTIME SOMNOLENCE: ICD-10-CM

## 2024-08-26 DIAGNOSIS — R06.02 SHORTNESS OF BREATH: ICD-10-CM

## 2024-08-26 PROCEDURE — 99215 OFFICE O/P EST HI 40 MIN: CPT | Mod: 25 | Performed by: INTERNAL MEDICINE

## 2024-08-26 PROCEDURE — 3078F DIAST BP <80 MM HG: CPT | Performed by: INTERNAL MEDICINE

## 2024-08-26 PROCEDURE — 94618 PULMONARY STRESS TESTING: CPT

## 2024-08-26 PROCEDURE — 99215 OFFICE O/P EST HI 40 MIN: CPT | Performed by: INTERNAL MEDICINE

## 2024-08-26 PROCEDURE — 94618 PULMONARY STRESS TESTING: CPT | Performed by: STUDENT IN AN ORGANIZED HEALTH CARE EDUCATION/TRAINING PROGRAM

## 2024-08-26 PROCEDURE — 3074F SYST BP LT 130 MM HG: CPT | Performed by: INTERNAL MEDICINE

## 2024-08-26 PROCEDURE — 3008F BODY MASS INDEX DOCD: CPT | Performed by: INTERNAL MEDICINE

## 2024-08-26 PROCEDURE — 1036F TOBACCO NON-USER: CPT | Performed by: INTERNAL MEDICINE

## 2024-08-26 ASSESSMENT — ENCOUNTER SYMPTOMS
FATIGUE: 1
MUSCULOSKELETAL NEGATIVE: 1
EYES NEGATIVE: 1
DIARRHEA: 1
PSYCHIATRIC NEGATIVE: 1
SHORTNESS OF BREATH: 1
PALPITATIONS: 0
DIZZINESS: 1
CHEST TIGHTNESS: 0
NAUSEA: 1
ALLERGIC/IMMUNOLOGIC NEGATIVE: 1
ENDOCRINE NEGATIVE: 1
HEMATOLOGIC/LYMPHATIC NEGATIVE: 1
WEAKNESS: 1
LIGHT-HEADEDNESS: 1
VOMITING: 1

## 2024-08-26 ASSESSMENT — PAIN SCALES - GENERAL: PAINLEVEL: 0-NO PAIN

## 2024-08-28 ENCOUNTER — DOCUMENTATION (OUTPATIENT)
Dept: PULMONOLOGY | Facility: HOSPITAL | Age: 49
End: 2024-08-28
Payer: COMMERCIAL

## 2024-08-28 NOTE — PROGRESS NOTES
8/26/204  [facility] the [facility] with clinicals.    -Oxygen concentrator - 85% Cap Pres Rate    -Portable oxygen concentrator    (f) [phone]

## 2024-09-04 ENCOUNTER — APPOINTMENT (OUTPATIENT)
Dept: CARDIOLOGY | Facility: CLINIC | Age: 49
End: 2024-09-04
Payer: COMMERCIAL

## 2024-09-13 PROCEDURE — RXMED WILLOW AMBULATORY MEDICATION CHARGE

## 2024-09-16 ENCOUNTER — PHARMACY VISIT (OUTPATIENT)
Dept: PHARMACY | Facility: CLINIC | Age: 49
End: 2024-09-16
Payer: COMMERCIAL

## 2024-09-25 ENCOUNTER — DOCUMENTATION (OUTPATIENT)
Dept: PULMONOLOGY | Facility: HOSPITAL | Age: 49
End: 2024-09-25
Payer: COMMERCIAL

## 2024-10-31 ENCOUNTER — APPOINTMENT (OUTPATIENT)
Dept: GASTROENTEROLOGY | Facility: CLINIC | Age: 49
End: 2024-10-31
Payer: COMMERCIAL

## 2024-11-05 ENCOUNTER — APPOINTMENT (OUTPATIENT)
Dept: CARDIOLOGY | Facility: HOSPITAL | Age: 49
End: 2024-11-05
Payer: COMMERCIAL

## 2024-11-06 DIAGNOSIS — I27.20 PULMONARY HYPERTENSION (MULTI): ICD-10-CM

## 2024-11-06 DIAGNOSIS — R06.02 SOB (SHORTNESS OF BREATH): ICD-10-CM

## 2024-11-06 DIAGNOSIS — R60.9 EDEMA, UNSPECIFIED TYPE: ICD-10-CM

## 2024-11-06 PROCEDURE — RXMED WILLOW AMBULATORY MEDICATION CHARGE

## 2024-11-06 RX ORDER — FUROSEMIDE 40 MG/1
80 TABLET ORAL DAILY
Qty: 60 TABLET | Refills: 11 | Status: SHIPPED | OUTPATIENT
Start: 2024-11-06 | End: 2025-11-06

## 2024-11-10 PROCEDURE — RXMED WILLOW AMBULATORY MEDICATION CHARGE

## 2024-11-11 ENCOUNTER — PHARMACY VISIT (OUTPATIENT)
Dept: PHARMACY | Facility: CLINIC | Age: 49
End: 2024-11-11
Payer: COMMERCIAL

## 2024-11-11 DIAGNOSIS — I27.20 PULMONARY HYPERTENSION (MULTI): Primary | ICD-10-CM

## 2024-11-11 RX ORDER — MACITENTAN 10 MG/1
10 TABLET, FILM COATED ORAL DAILY
Qty: 30 TABLET | Refills: 11 | Status: SHIPPED | OUTPATIENT
Start: 2024-11-11

## 2024-11-13 ENCOUNTER — PHARMACY VISIT (OUTPATIENT)
Dept: PHARMACY | Facility: CLINIC | Age: 49
End: 2024-11-13
Payer: COMMERCIAL

## 2024-11-13 RX ORDER — ALLOPURINOL 100 MG/1
TABLET ORAL
Qty: 90 TABLET | Refills: 3 | OUTPATIENT
Start: 2024-11-13

## 2024-11-13 RX ORDER — FLUOXETINE 20 MG/1
20 TABLET ORAL DAILY
Qty: 90 TABLET | Refills: 3 | Status: CANCELLED | OUTPATIENT
Start: 2024-11-13 | End: 2025-11-13

## 2024-11-13 RX ORDER — AMITRIPTYLINE HYDROCHLORIDE 50 MG/1
50 TABLET, FILM COATED ORAL NIGHTLY
Qty: 90 TABLET | Refills: 3 | Status: CANCELLED | OUTPATIENT
Start: 2024-11-13 | End: 2025-11-13

## 2024-11-13 RX ORDER — GABAPENTIN 600 MG/1
600 TABLET ORAL DAILY
Qty: 90 TABLET | Refills: 3 | Status: CANCELLED | OUTPATIENT
Start: 2024-11-13 | End: 2025-11-13

## 2024-11-13 RX ORDER — GABAPENTIN 300 MG/1
300 CAPSULE ORAL DAILY
Qty: 90 CAPSULE | Refills: 3 | Status: CANCELLED | OUTPATIENT
Start: 2024-11-13 | End: 2025-11-13

## 2024-11-13 NOTE — PROGRESS NOTES
History Of Present Illness  Jazmin Hein is a 49 y.o. female presenting with PAH. Patient is NYHA Functional Class 3 and WHO Group 1. Ms. Hein was admitted to the [facility] on 3/08/24 for N/V/D, HA, myalgias in s/o increasing selexipag dosage for pulmonary HTN. She endorsed the same symptoms for each uptitration, but the symptoms dissipate within 2-3 days. Currently endorsing the symptoms for 2-3 weeks w/ difficult tolerating PO and 10-15 lb weight loss. In the ED, patient was initiated on IV tylenol and zofran and rehydrated for soft BP. Selexipag (Uptravi) dosage was decreased to 1400 from 1600 and symptoms were monitored. Throughout stay, symptoms slowly dissipated but SOB persisted. Restarted diuresis day to day w/ 40 mg IV lasix pushes while monitoring RFP and UO. Additionally, patient required decrease Selexipag dosing to 600 micrograms BID w/ improvement of symptoms. Gastroenterology was consulted as selexipag dosing was being down titrated and assessed pt to be having side effects from selexipag and rec'd continued use of zofran with caution. Qtc was frequently monitored on EKGs with a peak Qtc of 502 on 3/19/24 (at which time pt was receiving zofran 3 times a day  minutes before each meal) and decreased back down to 491 on discharge day when the day before she was encouraged to only take zofran if she got nauseous (and only received Zofran day previous 3/21 for breakfast). Patient able to ambulate at baseline functional status and tolerate PO w/o vomiting on 3/22/24 and ok for discharge w/ follow up w/ Dr. Johnson' clinic. Restarted on home medications including home diuretic 40 mg PO Lasix. TTE was performed without any acute changes and stable moderate MR, consistent elevated RV pressure and RA dilation. Last office visit was on 8/26/24.    PAH Treatment:  Adempas 2.5 mg TID,   macitentan 10 mg (12/15/2023),   selexipag 600 -> 400 mcg (12/26/2023)  6L NC at all times  Infusion site:  N/A  Treatment history:  N/A    Today's testing includes : 6 MWT    Interval History           Past Medical History  Patient Active Problem List   Diagnosis    Dyspnea on exertion    SOB (shortness of breath) on exertion    Vitamin D deficiency    Ureterolithiasis    Transaminitis    TIA (transient ischemic attack)    Tachycardia    Weakness    Sleep apnea    Chronic pulmonary embolism (Multi)    Pulmonary hypertension (Multi)    Post-operative pain    Paresthesia of skin    Dependence on supplemental oxygen    Obstructive sleep apnea of adult    Obesity    Nausea and vomiting    Migraine headache    Hypoglycemia after GI (gastrointestinal) surgery    Lower back pain    Lipids abnormal    Leukopenia    Left arm weakness    Intermittent urinary incontinence    Insomnia    Hypoglycemia    Hypertension    Flushing    HLD (hyperlipidemia)    History of ovarian cancer    Heartburn    Arthritis    Gastric ulcer    Fibromyalgia    Electrolyte and fluid disorder    Drug-induced liver injury    Dizziness    Disease due to severe acute respiratory syndrome coronavirus 2 (SARS-CoV-2)    Daytime sleepiness    Chronic gouty arthritis    Chronic gout of foot due to renal impairment    Cholecystitis, chronic    Bipolar disorder    Bilateral swelling of feet    Bariatric surgery status    Arm paresthesia, left    Allergies    Acute unilateral obstructive uropathy    Abnormal findings on cardiac catheterization    Abnormal echocardiogram    Abdominal right upper quadrant tenderness    Abdominal pain, epigastric    Abdominal pain of multiple sites    Pacemaker    Chronic respiratory failure with hypoxia (Multi)    Paroxysmal supraventricular tachycardia (CMS-HCC)    PAC (premature atrial contraction)    PVC (premature ventricular contraction)    NSVT (nonsustained ventricular tachycardia) (Multi)    Palpitations    Chest pain    Allergic reaction to drug, initial encounter        Surgical History  She has a past surgical history that  includes Hysterectomy (2016); Other surgical history (2016);  section, classic (2016); Gastric bypass (2017); and Cardiac catheterization (N/A, 10/31/2023).     Social History  She reports that she has never smoked. She has never used smokeless tobacco. She reports that she does not currently use alcohol. She reports current drug use. Drug: Marijuana.    Family History  No family history on file.     Medications      Current Outpatient Medications:     Adempas 2.5 mg tablet, Take 1 tablet (2.5 mg) by mouth 3 times a day., Disp: 90 tablet, Rfl: 11    albuterol 90 mcg/actuation inhaler, INHALE 1 PUFF EVERY 6 HOURS AS NEEDED, Disp: 25.5 g, Rfl: 3    allopurinol (Zyloprim) 100 mg tablet, TAKE 1 TABLET BY MOUTH DAILY, Disp: 90 tablet, Rfl: 3    allopurinol (Zyloprim) 100 mg tablet, Take 1 tablet by mouth once daily, Disp: 30 tablet, Rfl: 3    amitriptyline (Elavil) 50 mg tablet, TAKE 1 TABLET BY MOUTH AT BEDTIME, Disp: 90 tablet, Rfl: 3    amitriptyline (Elavil) 50 mg tablet, Take 1 tablet by mouth once daily at bedtime, Disp: 30 tablet, Rfl: 3    ascorbic acid (Vitamin C) 500 mg tablet, Take 1 tablet (500 mg) by mouth once daily., Disp: , Rfl:     blood sugar diagnostic strip, as directed, Disp: , Rfl:     butalbitaL-acetaminophen  mg capsule, Take 1 capsule by mouth every 4 hours if needed (migraine)., Disp: , Rfl:     cariprazine (Vraylar) 1.5 mg capsule, Take 1 capsule (1.5 mg) by mouth once daily., Disp: , Rfl:     ergocalciferol (Vitamin D-2) 1.25 MG (35164 UT) capsule, Take 1 capsule by mouth once a week, Disp: 4 capsule, Rfl: 11    FLUoxetine (PROzac) 20 mg tablet, TAKE 1 TABLET BY MOUTH ONCE DAILY, Disp: 90 tablet, Rfl: 3    FLUoxetine (PROzac) 20 mg tablet, Take 1 tablet by mouth Once a day, Disp: 30 tablet, Rfl: 3    furosemide (Lasix) 40 mg tablet, Take 2 tablets (80 mg) by mouth once daily., Disp: 60 tablet, Rfl: 11    gabapentin (Neurontin) 300 mg capsule, TAKE 1  CAPSULE BY MOUTH ONCE DAILY, Disp: 90 capsule, Rfl: 3    gabapentin (Neurontin) 300 mg capsule, Take 1 capsule by mouth Once a day, Disp: 30 capsule, Rfl: 3    gabapentin (Neurontin) 600 mg tablet, TAKE 1 TABLET BY MOUTH ONCE DAILY, Disp: 90 tablet, Rfl: 3    gabapentin (Neurontin) 600 mg tablet, Take 1 tablet by mouth Once a day, Disp: 30 tablet, Rfl: 3    ipratropium-albuteroL (Duo-Neb) 0.5-2.5 mg/3 mL nebulizer solution, Take 3 mL by nebulization every 4 hours if needed for wheezing., Disp: 540 mL, Rfl: 0    lacosamide (Vimpat) 100 mg tablet, TAKE 1 TABLET BY MOUTH TWO TIMES A DAY FOR 30 DAYS, Disp: 60 each, Rfl: 3    lamoTRIgine (LaMICtal) 100 mg tablet, Take 1 tablet (100 mg) by mouth once daily at bedtime., Disp: , Rfl:     loratadine (Claritin) 10 mg tablet, Take 1 tablet (10 mg) by mouth once daily., Disp: , Rfl:     macitentan (Opsumit) 10 mg tablet tablet, Take 1 tablet daily., Disp: 30 tablet, Rfl: 11    methocarbamol (Robaxin) 750 mg tablet, TAKE 1 TABLET BY MOUTH EVERY 8 HOURS, Disp: 270 tablet, Rfl: 3    metoprolol tartrate (Lopressor) 25 mg tablet, Take 2 tablets (50 mg) by mouth 2 times a day., Disp: 360 tablet, Rfl: 3    multivitamin capsule, Take 1 capsule by mouth once daily., Disp: , Rfl:     omeprazole (PriLOSEC) 40 mg DR capsule, Take 1 capsule every day by oral route., Disp: , Rfl:     rivaroxaban (Xarelto) 10 mg tablet, TAKE 1 TABLET BY MOUTH ONCE DAILY WITH FOOD, Disp: 90 tablet, Rfl: 3    rivaroxaban (Xarelto) 10 mg tablet, Take 1 tablet by mouth with food Once a day, Disp: 30 tablet, Rfl: 3    SELEXIPAG ORAL, Take 400 mcg by mouth 2 times a day., Disp: , Rfl:     SUMAtriptan (Imitrex) 100 mg tablet, TAKE 1 TABLET BY MOUTH TWO TIMES A DAY AS NEEDED, Disp: 60 tablet, Rfl: 11     Allergies  Iodine and Iodine (bulk)    Review of Systems    Last Recorded Vitals  There were no vitals taken for this visit.     Physical Exam       Relevant Results  6MWT (11/27/24)  SP02-  HR-  SEJAL-  Actual  Meters-     6MWT (24) - 4 L/min contiuous.  SP02- 97->94%  HR-74-> 84  WILFREDO- 1->3  Actual Meters 152 (Previously 178) pushing a wheelchair for orthopedic concerns.      Echo 24  Left Ventricle: The left ventricular systolic function is normal, with a visually estimated ejection fraction of 60%. There are no regional wall motion abnormalities. The left ventricular cavity size is normal. Spectral Doppler shows an impaired relaxation pattern of left ventricular diastolic filling.  Left Atrium: The left atrium is normal in size.  Right Ventricle: The right ventricle is mildly enlarged. There is normal right ventricular global systolic function. A device is visualized in the right ventricle.  Right Atrium: The right atrium is normal in size.  Aortic Valve: The aortic valve is trileaflet. There is evidence of mildly elevated transaortic gradients consistent with sclerosis of the aortic valve. There is no evidence of aortic valve regurgitation. The peak instantaneous gradient of the aortic valve is 10.4 mmHg.  Mitral Valve: The mitral valve is mildly thickened. There is mild mitral valve regurgitation.  Tricuspid Valve: The tricuspid valve is structurally normal. There is mild tricuspid regurgitation.  Pulmonic Valve: The pulmonic valve is structurally normal. There is no indication of pulmonic valve regurgitation.  Pericardium: There is a trivial pericardial effusion.  Aorta: The aortic root is normal.  Pulmonary Artery: The tricuspid regurgitant velocity is 2.93 m/s, and with an estimated right atrial pressure of 3 mmHg, the estimated pulmonary artery pressure is mildly elevated with the RVSP at 37.4 mmHg.  In comparison to the previous echocardiogram(s): Compared with study dated 3/12/2024, The RVSP has decreased from 58 mm Hg.     6MWT  (2024)  SpO2: 98-95%  HR:   Wilfredo:0-5  Actual Meters: 178 meters     Echo (3/12/2024)  Left Ventricle: The left ventricular systolic function is normal, with an  estimated ejection fraction of 60-65%. There are no regional wall motion abnormalities. The left ventricular cavity size is normal. The interventricular septum is flattened in systole, consistent with right ventricular pressure overload. Spectral Doppler shows a pseudonormal pattern of left ventricular diastolic filling. There is an elevated mean left atrial pressure.  Left Atrium: The left atrium is mild to moderately dilated.  Right Ventricle: The right ventricle is normal in size. There is normal right ventricular global systolic function. A device is visualized in the right ventricle.  Right Atrium: The right atrium is severely dilated. There is a device visualized in the right atrium.  Aortic Valve: The aortic valve is trileaflet. There is no evidence of aortic valve regurgitation. The peak instantaneous gradient of the aortic valve is 14.2 mmHg. The mean gradient of the aortic valve is 7.2 mmHg.  Mitral Valve: The mitral valve is normal in structure. There is trace mitral valve regurgitation.  Tricuspid Valve: The tricuspid valve is structurally normal. There is mild tricuspid regurgitation. The Doppler estimated RVSP is moderate to severely elevated at 58.2 mmHg.  Pulmonic Valve: The pulmonic valve is structurally normal. There is physiologic pulmonic valve regurgitation.  Pericardium: There is no pericardial effusion noted.  Aorta: The aortic root is normal. The aortic root appears normal in size and measures 2.70 cm. The Asc Ao is 2.70 cm.  Systemic Veins: The inferior vena cava appears to be of normal size. There is IVC inspiratory collapse greater than 50%.  In comparison to the previous echocardiogram(s): Compared with study from 9/14/2023, the degree of TR is mildly increased. Otherwise findings are similar.  CONCLUSIONS:   1. Poorly visualized anatomical structures due to suboptimal image quality.   2. Spectral Doppler shows a pseudonormal pattern of left ventricular diastolic filling.   3. Left  "ventricular systolic function is normal with a 60-65% estimated ejection fraction.   4. Right ventricular pressure overload.   5. There is an elevated mean left atrial pressure.   6. The left atrium is mild to moderately dilated.   7. The right atrium is severely dilated.   8. Mild tricuspid regurgitation visualized.   9. Moderate to severely elevated right ventricular systolic pressure.  10. Normal aortic root.     RHC (10/31/23)  PAP- 62/20 38  PCWP-12  CO/CI- 5.8/3.1     6MWT (23)  SpO2: 97% - 95% on RA  HR: 100 - 126  Wilfredo: 1 - 5  Actual 189m     6MWT (2023)  SpO2: 100-97% 4LPM  HR:   Wilfredo-100  Actual meters: 146       V/Q scan (2023)   The perfusion of both lungs is within normal limits with no evidence for acute pulmonary embolism. Very low probability.     V/Q (3/27/23): no discrete ventilation abnormalities, no bronchopulmonary segment conforming perfusion defects; Dr. Brar notes \"segmental perfusion defect in right mid lung zones that appear mismatched\"         RHC (23)  PAp 60/23 (33)  PW 5  C.O. 4.52     PFTs (3/20/23): available for review         Assessment/Plan     1) Pulmonary  a. Normal PFTs and DLCO  b. Essentially normal CT  c. Paradoxically reported low oxygen levels although SpO2 walking for me today = 97% on 4l/min, walked on room air 2023 with desat to 95% on RA.   d. Stated remote () unprovoked PE with recurrence immediately after ceasing anticoagulation. Interpretation of V/Q with ? RML defect (Peripherally) I do not see it. Original interpretation did not see it. It is not common for presentation this many years after PE. Similarly even if there was small peripheral R defect, it does not explain profound report desat and exercise intolerance displayed. SEE BELOW UNDER CARDIOVASCULAR, NEW CATH CONSISTENT WITH PAH.     At this point echo minimally abnormal, still with symptoms, intolerant of further increase selexipag.         e. Mild ELENA, AHI =6 from " study, lowest SpO2= 87% relatively briefly.      2) Cardiovascular  a. Paroxysmal resting SVT. (+) ZIO patch referred to EP.   b. Original Resting PA mean of 32 reported which was confusing given,  progression to 47 mm HG consistent with IPAH, FC III. Given adempas, needs additional therapy. Exercise cath 47-> 57, no change in PCWP.  c. ? TIA remote.     3) Endocrine      A. BMI 34.47  B. Hyperlipidemia    Plan  1) Continue current Rx  2) Re-refer to sleep for daytime somnolence, patient nappping in room after 5 min.   3) Needs workup of nausea. Will refer to GI for nausea. No change with dramatic holding of drugs. Would not be expected from current PAH meds.   4) Follow up 3 months with 6 MW.

## 2024-11-27 ENCOUNTER — APPOINTMENT (OUTPATIENT)
Dept: RESPIRATORY THERAPY | Facility: HOSPITAL | Age: 49
End: 2024-11-27
Payer: COMMERCIAL

## 2024-11-27 ENCOUNTER — APPOINTMENT (OUTPATIENT)
Dept: PULMONOLOGY | Facility: HOSPITAL | Age: 49
End: 2024-11-27
Payer: COMMERCIAL

## 2024-11-27 DIAGNOSIS — Z79.899 LONG-TERM USE OF HIGH-RISK MEDICATION: Primary | ICD-10-CM

## 2024-11-27 DIAGNOSIS — I27.21 PULMONARY ARTERIAL HYPERTENSION (MULTI): ICD-10-CM

## 2024-12-02 ENCOUNTER — APPOINTMENT (OUTPATIENT)
Dept: RESPIRATORY THERAPY | Facility: HOSPITAL | Age: 49
End: 2024-12-02
Payer: COMMERCIAL

## 2024-12-02 ENCOUNTER — APPOINTMENT (OUTPATIENT)
Dept: PULMONOLOGY | Facility: HOSPITAL | Age: 49
End: 2024-12-02
Payer: COMMERCIAL

## 2024-12-04 ENCOUNTER — TELEPHONE (OUTPATIENT)
Dept: PULMONOLOGY | Facility: HOSPITAL | Age: 49
End: 2024-12-04
Payer: COMMERCIAL

## 2024-12-04 PROCEDURE — RXMED WILLOW AMBULATORY MEDICATION CHARGE

## 2024-12-06 ENCOUNTER — PHARMACY VISIT (OUTPATIENT)
Dept: PHARMACY | Facility: CLINIC | Age: 49
End: 2024-12-06
Payer: COMMERCIAL

## 2024-12-16 PROCEDURE — RXMED WILLOW AMBULATORY MEDICATION CHARGE

## 2024-12-19 ENCOUNTER — APPOINTMENT (OUTPATIENT)
Dept: RESPIRATORY THERAPY | Facility: HOSPITAL | Age: 49
End: 2024-12-19
Payer: COMMERCIAL

## 2024-12-19 ENCOUNTER — APPOINTMENT (OUTPATIENT)
Dept: PULMONOLOGY | Facility: HOSPITAL | Age: 49
End: 2024-12-19
Payer: COMMERCIAL

## 2024-12-19 ENCOUNTER — PHARMACY VISIT (OUTPATIENT)
Dept: PHARMACY | Facility: CLINIC | Age: 49
End: 2024-12-19
Payer: COMMERCIAL

## 2024-12-19 NOTE — PROGRESS NOTES
History Of Present Illness  Jazmin Hein is a 49 y.o. female presenting with pulmonary arterial hypertension follow up. Patient is NYHA Functional Class 3 and WHO Group 1. Last office visit was on 8/26/2024. Initially seen in clinic on 8/14/2023 for recurrent pulmonary embolism and  CTEPH evaluation.    PAH Treatment:  Adempas 2.5 mg TID (5/2023)     Opsumit 10 mg (12/15/2023)   Selexipag 400 mcg BID (12/26/2023)  Oxygen 6 LPM continuous  CPAP  Infusion site: N/A  Treatment history:  Uptravi 1600 > 600 mcg BID (2/20/2024-3/22/2024) Decrease due to nausea requiring inpatient admission  Uptravi 600 > 400 mcg BID (3/22/2024-?) Decrease due to headaches     Today's testing includes : 6 MWT and labs    Interval History       Past Medical History  Patient Active Problem List   Diagnosis    Dyspnea on exertion    SOB (shortness of breath) on exertion    Vitamin D deficiency    Ureterolithiasis    Transaminitis    TIA (transient ischemic attack)    Tachycardia    Weakness    Sleep apnea    Chronic pulmonary embolism (Multi)    Pulmonary hypertension (Multi)    Post-operative pain    Paresthesia of skin    Dependence on supplemental oxygen    Obstructive sleep apnea of adult    Obesity    Nausea and vomiting    Migraine headache    Hypoglycemia after GI (gastrointestinal) surgery    Lower back pain    Lipids abnormal    Leukopenia    Left arm weakness    Intermittent urinary incontinence    Insomnia    Hypoglycemia    Hypertension    Flushing    HLD (hyperlipidemia)    History of ovarian cancer    Heartburn    Arthritis    Gastric ulcer    Fibromyalgia    Electrolyte and fluid disorder    Drug-induced liver injury    Dizziness    Disease due to severe acute respiratory syndrome coronavirus 2 (SARS-CoV-2)    Daytime sleepiness    Chronic gouty arthritis    Chronic gout of foot due to renal impairment    Cholecystitis, chronic    Bipolar disorder    Bilateral swelling of feet    Bariatric surgery status    Arm paresthesia,  left    Allergies    Acute unilateral obstructive uropathy    Abnormal findings on cardiac catheterization    Abnormal echocardiogram    Abdominal right upper quadrant tenderness    Abdominal pain, epigastric    Abdominal pain of multiple sites    Pacemaker    Chronic respiratory failure with hypoxia (Multi)    Paroxysmal supraventricular tachycardia (CMS-HCC)    PAC (premature atrial contraction)    PVC (premature ventricular contraction)    NSVT (nonsustained ventricular tachycardia) (Multi)    Palpitations    Chest pain    Allergic reaction to drug, initial encounter        Surgical History  She has a past surgical history that includes Hysterectomy (2016); Other surgical history (2016);  section, classic (2016); Gastric bypass (2017); and Cardiac catheterization (N/A, 10/31/2023).     Social History  She reports that she has never smoked. She has never used smokeless tobacco. She reports that she does not currently use alcohol. She reports current drug use. Drug: Marijuana.    Family History  No family history on file.     Medications  Current Outpatient Medications:     Adempas 2.5 mg tablet, Take 1 tablet (2.5 mg) by mouth 3 times a day., Disp: 90 tablet, Rfl: 11    albuterol 90 mcg/actuation inhaler, INHALE 1 PUFF EVERY 6 HOURS AS NEEDED, Disp: 25.5 g, Rfl: 3    allopurinol (Zyloprim) 100 mg tablet, TAKE 1 TABLET BY MOUTH DAILY, Disp: 90 tablet, Rfl: 3    allopurinol (Zyloprim) 100 mg tablet, Take 1 tablet by mouth once daily, Disp: 30 tablet, Rfl: 3    allopurinol (Zyloprim) 100 mg tablet, Take 1 tablet by mouth daily, Disp: 90 tablet, Rfl: 3    amitriptyline (Elavil) 50 mg tablet, TAKE 1 TABLET BY MOUTH AT BEDTIME, Disp: 90 tablet, Rfl: 3    amitriptyline (Elavil) 50 mg tablet, Take 1 tablet by mouth once daily at bedtime, Disp: 30 tablet, Rfl: 3    ascorbic acid (Vitamin C) 500 mg tablet, Take 1 tablet (500 mg) by mouth once daily., Disp: , Rfl:     blood sugar diagnostic  strip, as directed, Disp: , Rfl:     butalbitaL-acetaminophen  mg capsule, Take 1 capsule by mouth every 4 hours if needed (migraine)., Disp: , Rfl:     cariprazine (Vraylar) 1.5 mg capsule, Take 1 capsule (1.5 mg) by mouth once daily., Disp: , Rfl:     ergocalciferol (Vitamin D-2) 1.25 MG (79901 UT) capsule, Take 1 capsule by mouth once a week, Disp: 4 capsule, Rfl: 11    FLUoxetine (PROzac) 20 mg tablet, TAKE 1 TABLET BY MOUTH ONCE DAILY, Disp: 90 tablet, Rfl: 3    FLUoxetine (PROzac) 20 mg tablet, Take 1 tablet by mouth once a day, Disp: 30 tablet, Rfl: 3    furosemide (Lasix) 40 mg tablet, Take 2 tablets (80 mg) by mouth once daily., Disp: 60 tablet, Rfl: 11    gabapentin (Neurontin) 300 mg capsule, TAKE 1 CAPSULE BY MOUTH ONCE DAILY, Disp: 90 capsule, Rfl: 3    gabapentin (Neurontin) 300 mg capsule, Take 1 capsule by mouth once a day, Disp: 30 capsule, Rfl: 3    gabapentin (Neurontin) 600 mg tablet, TAKE 1 TABLET BY MOUTH ONCE DAILY, Disp: 90 tablet, Rfl: 3    gabapentin (Neurontin) 600 mg tablet, Take 1 tablet by mouth once a day, Disp: 30 tablet, Rfl: 3    ipratropium-albuteroL (Duo-Neb) 0.5-2.5 mg/3 mL nebulizer solution, Take 3 mL by nebulization every 4 hours if needed for wheezing., Disp: 540 mL, Rfl: 0    lacosamide (Vimpat) 100 mg tablet, TAKE 1 TABLET BY MOUTH TWO TIMES A DAY FOR 30 DAYS, Disp: 60 each, Rfl: 3    lamoTRIgine (LaMICtal) 100 mg tablet, Take 1 tablet (100 mg) by mouth once daily at bedtime., Disp: , Rfl:     loratadine (Claritin) 10 mg tablet, Take 1 tablet (10 mg) by mouth once daily., Disp: , Rfl:     macitentan (Opsumit) 10 mg tablet tablet, Take 1 tablet daily., Disp: 30 tablet, Rfl: 11    methocarbamol (Robaxin) 750 mg tablet, TAKE 1 TABLET BY MOUTH EVERY 8 HOURS, Disp: 270 tablet, Rfl: 3    metoprolol tartrate (Lopressor) 25 mg tablet, Take 2 tablets (50 mg) by mouth 2 times a day., Disp: 360 tablet, Rfl: 3    multivitamin capsule, Take 1 capsule by mouth once daily., Disp: ,  Rfl:     omeprazole (PriLOSEC) 40 mg DR capsule, Take 1 capsule every day by oral route., Disp: , Rfl:     rivaroxaban (Xarelto) 10 mg tablet, TAKE 1 TABLET BY MOUTH ONCE DAILY WITH FOOD, Disp: 90 tablet, Rfl: 3    rivaroxaban (Xarelto) 10 mg tablet, Take 1 tablet by mouth with food Once a day, Disp: 30 tablet, Rfl: 3    SELEXIPAG ORAL, Take 400 mcg by mouth 2 times a day., Disp: , Rfl:     SUMAtriptan (Imitrex) 100 mg tablet, TAKE 1 TABLET BY MOUTH TWO TIMES A DAY AS NEEDED, Disp: 60 tablet, Rfl: 11     Allergies  Iodine and Iodine (bulk)    Review of Systems    Last Recorded Vitals  There were no vitals taken for this visit.     Physical Exam       Relevant Results    6MWT (1/9/2024)  SP02-  HR-  SEJAL-  Actual Meters-     6MWT (8/26/2024) - 4 L/min contiuous.  SP02- 97->94%  HR-74-> 84  SEJAL- 1->3  Actual Meters 152 (Previously 178) pushing a wheelchair for orthopedic concerns.      Echo (7/23/2024)  Left Ventricle: The left ventricular systolic function is normal, with a visually estimated ejection fraction of 60%. There are no regional wall motion abnormalities. The left ventricular cavity size is normal. Spectral Doppler shows an impaired relaxation pattern of left ventricular diastolic filling.  Left Atrium: The left atrium is normal in size.  Right Ventricle: The right ventricle is mildly enlarged. There is normal right ventricular global systolic function. A device is visualized in the right ventricle.  Right Atrium: The right atrium is normal in size.  Aortic Valve: The aortic valve is trileaflet. There is evidence of mildly elevated transaortic gradients consistent with sclerosis of the aortic valve. There is no evidence of aortic valve regurgitation. The peak instantaneous gradient of the aortic valve is 10.4 mmHg.  Mitral Valve: The mitral valve is mildly thickened. There is mild mitral valve regurgitation.  Tricuspid Valve: The tricuspid valve is structurally normal. There is mild tricuspid  regurgitation.  Pulmonic Valve: The pulmonic valve is structurally normal. There is no indication of pulmonic valve regurgitation.  Pericardium: There is a trivial pericardial effusion.  Aorta: The aortic root is normal.  Pulmonary Artery: The tricuspid regurgitant velocity is 2.93 m/s, and with an estimated right atrial pressure of 3 mmHg, the estimated pulmonary artery pressure is mildly elevated with the RVSP at 37.4 mmHg.  In comparison to the previous echocardiogram(s): Compared with study dated 3/12/2024, The RVSP has decreased from 58 mm Hg.     6MWT  (2024)  SpO2: 98-95%  HR:   Wilfredo:0-5  Actual Meters: 178 meters     Echo (3/12/2024)  Left Ventricle: The left ventricular systolic function is normal, with an estimated ejection fraction of 60-65%. There are no regional wall motion abnormalities. The left ventricular cavity size is normal. The interventricular septum is flattened in systole, consistent with right ventricular pressure overload. Spectral Doppler shows a pseudonormal pattern of left ventricular diastolic filling. There is an elevated mean left atrial pressure.  Left Atrium: The left atrium is mild to moderately dilated.  Right Ventricle: The right ventricle is normal in size. There is normal right ventricular global systolic function. A device is visualized in the right ventricle.  Right Atrium: The right atrium is severely dilated. There is a device visualized in the right atrium.  Aortic Valve: The aortic valve is trileaflet. There is no evidence of aortic valve regurgitation. The peak instantaneous gradient of the aortic valve is 14.2 mmHg. The mean gradient of the aortic valve is 7.2 mmHg.  Mitral Valve: The mitral valve is normal in structure. There is trace mitral valve regurgitation.  Tricuspid Valve: The tricuspid valve is structurally normal. There is mild tricuspid regurgitation. The Doppler estimated RVSP is moderate to severely elevated at 58.2 mmHg.  Pulmonic Valve: The  "pulmonic valve is structurally normal. There is physiologic pulmonic valve regurgitation.  Pericardium: There is no pericardial effusion noted.  Aorta: The aortic root is normal. The aortic root appears normal in size and measures 2.70 cm. The Asc Ao is 2.70 cm.  Systemic Veins: The inferior vena cava appears to be of normal size. There is IVC inspiratory collapse greater than 50%.  In comparison to the previous echocardiogram(s): Compared with study from 2023, the degree of TR is mildly increased. Otherwise findings are similar.  CONCLUSIONS:   1. Poorly visualized anatomical structures due to suboptimal image quality.   2. Spectral Doppler shows a pseudonormal pattern of left ventricular diastolic filling.   3. Left ventricular systolic function is normal with a 60-65% estimated ejection fraction.   4. Right ventricular pressure overload.   5. There is an elevated mean left atrial pressure.   6. The left atrium is mild to moderately dilated.   7. The right atrium is severely dilated.   8. Mild tricuspid regurgitation visualized.   9. Moderate to severely elevated right ventricular systolic pressure.  10. Normal aortic root.     RHC (10/31/2023)  PAP- 62/20 (38)  PCWP-12  CO/CI- 5.8/3.1     6MWT (2023)  SpO2: 97% - 95% on RA  HR: 100 - 126  Wilfredo: 1 - 5  Actual 189m     6MWT (2023)  SpO2: 100-97% 4LPM  HR:   Wilfredo-100  Actual meters: 146       V/Q (2023)   The perfusion of both lungs is within normal limits with no evidence for acute pulmonary embolism. Very low probability.     V/Q (3/27/2023)  No discrete ventilation abnormalities, no bronchopulmonary segment conforming perfusion defects; Dr. Brar notes \"segmental perfusion defect in right mid lung zones that appear mismatched\"      RHC (2023)  PAp 60/23 (33)  PW 5  C.O. 4.52     PFTs (3/20/2023)  Available for review      Assessment/Plan     1) Pulmonary  a. Normal PFTs and DLCO  b. Essentially normal CT  c. Paradoxically " reported low oxygen levels although SpO2 walking for me today = 97% on 4l/min, walked on room air 9/14/2023 with desat to 95% on RA.   d. Stated remote (2014) unprovoked PE with recurrence immediately after ceasing anticoagulation. Interpretation of V/Q with ? RML defect (Peripherally) I do not see it. Original interpretation did not see it. It is not common for presentation this many years after PE. Similarly even if there was small peripheral R defect, it does not explain profound report desat and exercise intolerance displayed. SEE BELOW UNDER CARDIOVASCULAR, NEW CATH (10/31/2023) CONSISTENT WITH PAH.     At this point echo minimally abnormal, still with symptoms, intolerant of further increase selexipag.         e. Mild ELENA, AHI =6 from study, lowest SpO2= 87% relatively briefly.      2) Cardiovascular  a. Paroxysmal resting SVT. (+) ZIO patch referred to EP.   b. Original Resting PA mean of 32 reported which was confusing given,  progression to 47 mm HG consistent with IPAH, FC III. Given adempas, needs additional therapy. Exercise cath 47-> 57, no change in PCWP.  c. ? TIA remote.     3) Endocrine      A. BMI 34.47  B. Hyperlipidemia    Plan    1) Continue current Rx  2) Re-refer to sleep for daytime somnolence, patient nappping in room after 5 min.   3) Needs workup of nausea. Will refer to GI for nausea. No change with dramatic holding of drugs. Would not be expected from current PAH meds.   4) Follow up 3 months with 6 MW.

## 2024-12-23 PROCEDURE — RXMED WILLOW AMBULATORY MEDICATION CHARGE

## 2024-12-26 ENCOUNTER — PHARMACY VISIT (OUTPATIENT)
Dept: PHARMACY | Facility: CLINIC | Age: 49
End: 2024-12-26
Payer: COMMERCIAL

## 2024-12-26 PROCEDURE — RXMED WILLOW AMBULATORY MEDICATION CHARGE

## 2024-12-26 RX ORDER — METHOCARBAMOL 750 MG/1
750 TABLET, FILM COATED ORAL EVERY 8 HOURS
Qty: 270 TABLET | Refills: 0 | OUTPATIENT
Start: 2024-12-26

## 2024-12-27 ENCOUNTER — PHARMACY VISIT (OUTPATIENT)
Dept: PHARMACY | Facility: CLINIC | Age: 49
End: 2024-12-27
Payer: COMMERCIAL

## 2025-01-09 ENCOUNTER — APPOINTMENT (OUTPATIENT)
Dept: PULMONOLOGY | Facility: HOSPITAL | Age: 50
End: 2025-01-09
Payer: COMMERCIAL

## 2025-01-09 ENCOUNTER — APPOINTMENT (OUTPATIENT)
Dept: RESPIRATORY THERAPY | Facility: HOSPITAL | Age: 50
End: 2025-01-09
Payer: COMMERCIAL

## 2025-01-09 DIAGNOSIS — I27.20 PULMONARY HYPERTENSION (MULTI): ICD-10-CM

## 2025-01-09 DIAGNOSIS — Z79.899 LONG-TERM USE OF HIGH-RISK MEDICATION: ICD-10-CM

## 2025-01-09 PROCEDURE — RXMED WILLOW AMBULATORY MEDICATION CHARGE

## 2025-01-10 ENCOUNTER — PHARMACY VISIT (OUTPATIENT)
Dept: PHARMACY | Facility: CLINIC | Age: 50
End: 2025-01-10
Payer: COMMERCIAL

## 2025-01-13 PROCEDURE — RXMED WILLOW AMBULATORY MEDICATION CHARGE

## 2025-01-16 ENCOUNTER — PHARMACY VISIT (OUTPATIENT)
Dept: PHARMACY | Facility: CLINIC | Age: 50
End: 2025-01-16
Payer: COMMERCIAL

## 2025-01-27 DIAGNOSIS — I27.20 PULMONARY HYPERTENSION (MULTI): ICD-10-CM

## 2025-01-30 RX ORDER — RIOCIGUAT 2.5 MG/1
2.5 TABLET, FILM COATED ORAL 3 TIMES DAILY
Qty: 45 TABLET | Refills: 3 | Status: SHIPPED | OUTPATIENT
Start: 2025-01-30

## 2025-02-06 PROCEDURE — RXMED WILLOW AMBULATORY MEDICATION CHARGE

## 2025-02-08 ENCOUNTER — PHARMACY VISIT (OUTPATIENT)
Dept: PHARMACY | Facility: CLINIC | Age: 50
End: 2025-02-08
Payer: COMMERCIAL

## 2025-02-10 PROCEDURE — RXMED WILLOW AMBULATORY MEDICATION CHARGE

## 2025-02-12 ENCOUNTER — APPOINTMENT (OUTPATIENT)
Dept: RESPIRATORY THERAPY | Facility: HOSPITAL | Age: 50
End: 2025-02-12
Payer: COMMERCIAL

## 2025-02-12 ENCOUNTER — APPOINTMENT (OUTPATIENT)
Dept: PULMONOLOGY | Facility: HOSPITAL | Age: 50
End: 2025-02-12
Payer: COMMERCIAL

## 2025-02-12 ENCOUNTER — PHARMACY VISIT (OUTPATIENT)
Dept: PHARMACY | Facility: CLINIC | Age: 50
End: 2025-02-12
Payer: COMMERCIAL

## 2025-02-24 DIAGNOSIS — I27.20 PULMONARY HYPERTENSION (MULTI): ICD-10-CM

## 2025-02-25 RX ORDER — RIOCIGUAT 2.5 MG/1
2.5 TABLET, FILM COATED ORAL 3 TIMES DAILY
Qty: 21 TABLET | Refills: 11 | Status: SHIPPED | OUTPATIENT
Start: 2025-02-25

## 2025-03-06 PROCEDURE — RXMED WILLOW AMBULATORY MEDICATION CHARGE

## 2025-03-07 ENCOUNTER — PHARMACY VISIT (OUTPATIENT)
Dept: PHARMACY | Facility: CLINIC | Age: 50
End: 2025-03-07
Payer: COMMERCIAL

## (undated) DEVICE — SLEEVE, REPOSITIONING, W/C-LOCK ADAPTER, 60 CM

## (undated) DEVICE — CATHETER, THERMODILUTION, SWAN GANZ, HI-SHORE, COATED, W/GUIDEWIRE, 7 FR, 110 CM

## (undated) DEVICE — ACCESS KIT, S-MAK MINI, 4FR 10CM 0.018IN 40CM, NT/PT, ECHO ENHANCE NEEDLE

## (undated) DEVICE — NEEDLE, ENTRY, PERCUTANEOUS, 21 G X 2.5 CM

## (undated) DEVICE — SHEATH, PINNACLE, 10 CM,  7FR INTRODUCER, 7FR DIA, 2.5 CM DIALATOR